# Patient Record
Sex: FEMALE | Race: WHITE | NOT HISPANIC OR LATINO | Employment: OTHER | URBAN - METROPOLITAN AREA
[De-identification: names, ages, dates, MRNs, and addresses within clinical notes are randomized per-mention and may not be internally consistent; named-entity substitution may affect disease eponyms.]

---

## 2017-05-30 ENCOUNTER — APPOINTMENT (OUTPATIENT)
Dept: GENERAL RADIOLOGY | Facility: CLINIC | Age: 82
DRG: 190 | End: 2017-05-30
Attending: EMERGENCY MEDICINE
Payer: MEDICARE

## 2017-05-30 ENCOUNTER — HOSPITAL ENCOUNTER (INPATIENT)
Facility: CLINIC | Age: 82
LOS: 7 days | Discharge: HOME HEALTH | DRG: 190 | End: 2017-06-06
Attending: EMERGENCY MEDICINE | Admitting: INTERNAL MEDICINE
Payer: MEDICARE

## 2017-05-30 DIAGNOSIS — F02.80 ALZHEIMER'S DEMENTIA WITHOUT BEHAVIORAL DISTURBANCE, UNSPECIFIED TIMING OF DEMENTIA ONSET: ICD-10-CM

## 2017-05-30 DIAGNOSIS — G30.9 ALZHEIMER'S DEMENTIA WITHOUT BEHAVIORAL DISTURBANCE, UNSPECIFIED TIMING OF DEMENTIA ONSET: ICD-10-CM

## 2017-05-30 DIAGNOSIS — J44.1 COPD EXACERBATION (H): Primary | ICD-10-CM

## 2017-05-30 DIAGNOSIS — F17.200 TOBACCO DEPENDENCE SYNDROME: ICD-10-CM

## 2017-05-30 DIAGNOSIS — N28.9 RENAL INSUFFICIENCY: ICD-10-CM

## 2017-05-30 DIAGNOSIS — J18.9 PNEUMONIA OF LEFT LOWER LOBE DUE TO INFECTIOUS ORGANISM: ICD-10-CM

## 2017-05-30 DIAGNOSIS — J45.901 REACTIVE AIRWAY DISEASE, UNSPECIFIED ASTHMA SEVERITY, WITH ACUTE EXACERBATION: ICD-10-CM

## 2017-05-30 DIAGNOSIS — R53.81 PHYSICAL DECONDITIONING: ICD-10-CM

## 2017-05-30 LAB
ANION GAP SERPL CALCULATED.3IONS-SCNC: 10 MMOL/L (ref 3–14)
BASE DEFICIT BLDV-SCNC: 1.1 MMOL/L
BASOPHILS # BLD AUTO: 0.1 10E9/L (ref 0–0.2)
BASOPHILS NFR BLD AUTO: 1 %
BUN SERPL-MCNC: 49 MG/DL (ref 7–30)
CALCIUM SERPL-MCNC: 9 MG/DL (ref 8.5–10.1)
CHLORIDE SERPL-SCNC: 95 MMOL/L (ref 94–109)
CO2 SERPL-SCNC: 26 MMOL/L (ref 20–32)
CREAT SERPL-MCNC: 1.53 MG/DL (ref 0.52–1.04)
DIFFERENTIAL METHOD BLD: ABNORMAL
EOSINOPHIL # BLD AUTO: 0 10E9/L (ref 0–0.7)
EOSINOPHIL NFR BLD AUTO: 0 %
ERYTHROCYTE [DISTWIDTH] IN BLOOD BY AUTOMATED COUNT: 12.8 % (ref 10–15)
GFR SERPL CREATININE-BSD FRML MDRD: 32 ML/MIN/1.7M2
GLUCOSE SERPL-MCNC: 115 MG/DL (ref 70–99)
HCO3 BLDV-SCNC: 25 MMOL/L (ref 21–28)
HCT VFR BLD AUTO: 37.3 % (ref 35–47)
HGB BLD-MCNC: 12.3 G/DL (ref 11.7–15.7)
LYMPHOCYTES # BLD AUTO: 0.4 10E9/L (ref 0.8–5.3)
LYMPHOCYTES NFR BLD AUTO: 4 %
MCH RBC QN AUTO: 29.7 PG (ref 26.5–33)
MCHC RBC AUTO-ENTMCNC: 33 G/DL (ref 31.5–36.5)
MCV RBC AUTO: 90 FL (ref 78–100)
MONOCYTES # BLD AUTO: 1.1 10E9/L (ref 0–1.3)
MONOCYTES NFR BLD AUTO: 11 %
NEUTROPHILS # BLD AUTO: 8.7 10E9/L (ref 1.6–8.3)
NEUTROPHILS NFR BLD AUTO: 84 %
O2/TOTAL GAS SETTING VFR VENT: 3 %
PCO2 BLDV: 45 MM HG (ref 40–50)
PH BLDV: 7.35 PH (ref 7.32–7.43)
PLATELET # BLD AUTO: 285 10E9/L (ref 150–450)
PLATELET # BLD EST: NORMAL 10*3/UL
PO2 BLDV: 39 MM HG (ref 25–47)
POTASSIUM SERPL-SCNC: 3.6 MMOL/L (ref 3.4–5.3)
RBC # BLD AUTO: 4.14 10E12/L (ref 3.8–5.2)
RBC MORPH BLD: ABNORMAL
SODIUM SERPL-SCNC: 131 MMOL/L (ref 133–144)
TOXIC GRANULES BLD QL SMEAR: PRESENT
WBC # BLD AUTO: 10.4 10E9/L (ref 4–11)

## 2017-05-30 PROCEDURE — 25000128 H RX IP 250 OP 636: Performed by: EMERGENCY MEDICINE

## 2017-05-30 PROCEDURE — 93005 ELECTROCARDIOGRAM TRACING: CPT

## 2017-05-30 PROCEDURE — 80048 BASIC METABOLIC PNL TOTAL CA: CPT | Performed by: EMERGENCY MEDICINE

## 2017-05-30 PROCEDURE — 25000125 ZZHC RX 250: Performed by: EMERGENCY MEDICINE

## 2017-05-30 PROCEDURE — 25000132 ZZH RX MED GY IP 250 OP 250 PS 637: Mod: GY | Performed by: PHYSICIAN ASSISTANT

## 2017-05-30 PROCEDURE — 96365 THER/PROPH/DIAG IV INF INIT: CPT

## 2017-05-30 PROCEDURE — 71020 XR CHEST 2 VW: CPT

## 2017-05-30 PROCEDURE — 85025 COMPLETE CBC W/AUTO DIFF WBC: CPT | Performed by: EMERGENCY MEDICINE

## 2017-05-30 PROCEDURE — 94640 AIRWAY INHALATION TREATMENT: CPT

## 2017-05-30 PROCEDURE — A9270 NON-COVERED ITEM OR SERVICE: HCPCS | Mod: GY | Performed by: PHYSICIAN ASSISTANT

## 2017-05-30 PROCEDURE — 12000000 ZZH R&B MED SURG/OB

## 2017-05-30 PROCEDURE — 82803 BLOOD GASES ANY COMBINATION: CPT | Performed by: EMERGENCY MEDICINE

## 2017-05-30 PROCEDURE — 96375 TX/PRO/DX INJ NEW DRUG ADDON: CPT

## 2017-05-30 PROCEDURE — 96366 THER/PROPH/DIAG IV INF ADDON: CPT

## 2017-05-30 PROCEDURE — 99223 1ST HOSP IP/OBS HIGH 75: CPT | Mod: AI | Performed by: PHYSICIAN ASSISTANT

## 2017-05-30 PROCEDURE — 25000128 H RX IP 250 OP 636

## 2017-05-30 PROCEDURE — 25000128 H RX IP 250 OP 636: Performed by: PHYSICIAN ASSISTANT

## 2017-05-30 PROCEDURE — 99285 EMERGENCY DEPT VISIT HI MDM: CPT | Mod: 25

## 2017-05-30 RX ORDER — NALOXONE HYDROCHLORIDE 0.4 MG/ML
.1-.4 INJECTION, SOLUTION INTRAMUSCULAR; INTRAVENOUS; SUBCUTANEOUS
Status: DISCONTINUED | OUTPATIENT
Start: 2017-05-30 | End: 2017-06-06 | Stop reason: HOSPADM

## 2017-05-30 RX ORDER — HYDROCHLOROTHIAZIDE 12.5 MG/1
12.5 CAPSULE ORAL DAILY
COMMUNITY
End: 2023-01-01

## 2017-05-30 RX ORDER — AMOXICILLIN 250 MG
1-2 CAPSULE ORAL 2 TIMES DAILY PRN
Status: DISCONTINUED | OUTPATIENT
Start: 2017-05-30 | End: 2017-06-06 | Stop reason: HOSPADM

## 2017-05-30 RX ORDER — METHYLPREDNISOLONE SODIUM SUCCINATE 125 MG/2ML
INJECTION, POWDER, LYOPHILIZED, FOR SOLUTION INTRAMUSCULAR; INTRAVENOUS
Status: COMPLETED
Start: 2017-05-30 | End: 2017-05-30

## 2017-05-30 RX ORDER — LEVOTHYROXINE SODIUM 50 UG/1
50 TABLET ORAL
Status: DISCONTINUED | OUTPATIENT
Start: 2017-05-31 | End: 2017-06-06 | Stop reason: HOSPADM

## 2017-05-30 RX ORDER — DONEPEZIL HYDROCHLORIDE 5 MG/1
5 TABLET, FILM COATED ORAL EVERY EVENING
Status: DISCONTINUED | OUTPATIENT
Start: 2017-05-30 | End: 2017-06-06 | Stop reason: HOSPADM

## 2017-05-30 RX ORDER — SIMVASTATIN 40 MG
40 TABLET ORAL EVERY EVENING
COMMUNITY
End: 2023-01-01

## 2017-05-30 RX ORDER — LIDOCAINE 40 MG/G
CREAM TOPICAL
Status: DISCONTINUED | OUTPATIENT
Start: 2017-05-30 | End: 2017-06-06 | Stop reason: HOSPADM

## 2017-05-30 RX ORDER — DONEPEZIL HYDROCHLORIDE 5 MG/1
5 TABLET, FILM COATED ORAL EVERY EVENING
COMMUNITY
End: 2023-01-01

## 2017-05-30 RX ORDER — CEFTRIAXONE 2 G/1
2 INJECTION, POWDER, FOR SOLUTION INTRAMUSCULAR; INTRAVENOUS ONCE
Status: COMPLETED | OUTPATIENT
Start: 2017-05-30 | End: 2017-05-30

## 2017-05-30 RX ORDER — HYDROCHLOROTHIAZIDE 12.5 MG/1
12.5 CAPSULE ORAL DAILY
Status: DISCONTINUED | OUTPATIENT
Start: 2017-05-31 | End: 2017-06-06 | Stop reason: HOSPADM

## 2017-05-30 RX ORDER — IPRATROPIUM BROMIDE AND ALBUTEROL SULFATE 2.5; .5 MG/3ML; MG/3ML
3 SOLUTION RESPIRATORY (INHALATION)
Status: DISCONTINUED | OUTPATIENT
Start: 2017-05-30 | End: 2017-05-30

## 2017-05-30 RX ORDER — IPRATROPIUM BROMIDE AND ALBUTEROL SULFATE 2.5; .5 MG/3ML; MG/3ML
3 SOLUTION RESPIRATORY (INHALATION)
Status: DISCONTINUED | OUTPATIENT
Start: 2017-05-30 | End: 2017-05-31

## 2017-05-30 RX ORDER — ASPIRIN 325 MG
325 TABLET ORAL DAILY
COMMUNITY
End: 2023-01-01

## 2017-05-30 RX ORDER — ONDANSETRON 4 MG/1
4 TABLET, ORALLY DISINTEGRATING ORAL EVERY 6 HOURS PRN
Status: DISCONTINUED | OUTPATIENT
Start: 2017-05-30 | End: 2017-06-06 | Stop reason: HOSPADM

## 2017-05-30 RX ORDER — QUINAPRIL 10 MG/1
20 TABLET ORAL DAILY
Status: DISCONTINUED | OUTPATIENT
Start: 2017-05-31 | End: 2017-06-06 | Stop reason: HOSPADM

## 2017-05-30 RX ORDER — ONDANSETRON 2 MG/ML
4 INJECTION INTRAMUSCULAR; INTRAVENOUS EVERY 6 HOURS PRN
Status: DISCONTINUED | OUTPATIENT
Start: 2017-05-30 | End: 2017-06-06 | Stop reason: HOSPADM

## 2017-05-30 RX ORDER — ASPIRIN 325 MG
325 TABLET ORAL DAILY
Status: DISCONTINUED | OUTPATIENT
Start: 2017-05-31 | End: 2017-06-06 | Stop reason: HOSPADM

## 2017-05-30 RX ORDER — CEFTRIAXONE 2 G/1
2 INJECTION, POWDER, FOR SOLUTION INTRAMUSCULAR; INTRAVENOUS EVERY 24 HOURS
Status: DISCONTINUED | OUTPATIENT
Start: 2017-05-31 | End: 2017-06-06 | Stop reason: HOSPADM

## 2017-05-30 RX ORDER — PREDNISONE 20 MG/1
40 TABLET ORAL DAILY
Status: DISCONTINUED | OUTPATIENT
Start: 2017-05-31 | End: 2017-06-06 | Stop reason: HOSPADM

## 2017-05-30 RX ORDER — POLYETHYLENE GLYCOL 3350 17 G/17G
17 POWDER, FOR SOLUTION ORAL DAILY PRN
Status: DISCONTINUED | OUTPATIENT
Start: 2017-05-30 | End: 2017-06-06 | Stop reason: HOSPADM

## 2017-05-30 RX ORDER — QUINAPRIL 40 MG/1
20 TABLET ORAL DAILY
COMMUNITY
End: 2023-01-01

## 2017-05-30 RX ORDER — SODIUM CHLORIDE 9 MG/ML
INJECTION, SOLUTION INTRAVENOUS CONTINUOUS
Status: ACTIVE | OUTPATIENT
Start: 2017-05-30 | End: 2017-05-31

## 2017-05-30 RX ORDER — ALBUTEROL SULFATE 0.83 MG/ML
3 SOLUTION RESPIRATORY (INHALATION)
Status: DISCONTINUED | OUTPATIENT
Start: 2017-05-30 | End: 2017-06-06 | Stop reason: HOSPADM

## 2017-05-30 RX ORDER — ACETAMINOPHEN 325 MG/1
650 TABLET ORAL EVERY 4 HOURS PRN
Status: DISCONTINUED | OUTPATIENT
Start: 2017-05-30 | End: 2017-06-06 | Stop reason: HOSPADM

## 2017-05-30 RX ORDER — AZITHROMYCIN 250 MG/1
250 TABLET, FILM COATED ORAL EVERY 24 HOURS
Status: COMPLETED | OUTPATIENT
Start: 2017-05-31 | End: 2017-06-03

## 2017-05-30 RX ORDER — METHYLPREDNISOLONE SODIUM SUCCINATE 125 MG/2ML
125 INJECTION, POWDER, LYOPHILIZED, FOR SOLUTION INTRAMUSCULAR; INTRAVENOUS ONCE
Status: COMPLETED | OUTPATIENT
Start: 2017-05-30 | End: 2017-05-30

## 2017-05-30 RX ORDER — SIMVASTATIN 40 MG
40 TABLET ORAL EVERY EVENING
Status: DISCONTINUED | OUTPATIENT
Start: 2017-05-30 | End: 2017-06-06 | Stop reason: HOSPADM

## 2017-05-30 RX ORDER — NICOTINE 21 MG/24HR
1 PATCH, TRANSDERMAL 24 HOURS TRANSDERMAL DAILY PRN
Status: DISCONTINUED | OUTPATIENT
Start: 2017-05-30 | End: 2017-06-06 | Stop reason: HOSPADM

## 2017-05-30 RX ORDER — CALCIUM CARBONATE 500 MG/1
1 TABLET, CHEWABLE ORAL DAILY
COMMUNITY
End: 2023-01-01

## 2017-05-30 RX ADMIN — METHYLPREDNISOLONE SODIUM SUCCINATE 125 MG: 125 INJECTION INTRAMUSCULAR; INTRAVENOUS at 21:02

## 2017-05-30 RX ADMIN — AZITHROMYCIN MONOHYDRATE 500 MG: 500 INJECTION, POWDER, LYOPHILIZED, FOR SOLUTION INTRAVENOUS at 22:07

## 2017-05-30 RX ADMIN — CEFTRIAXONE 2 G: 2 INJECTION, POWDER, FOR SOLUTION INTRAMUSCULAR; INTRAVENOUS at 21:25

## 2017-05-30 RX ADMIN — SODIUM CHLORIDE: 9 INJECTION, SOLUTION INTRAVENOUS at 23:21

## 2017-05-30 RX ADMIN — DONEPEZIL HYDROCHLORIDE 5 MG: 5 TABLET, FILM COATED ORAL at 23:53

## 2017-05-30 RX ADMIN — METHYLPREDNISOLONE SODIUM SUCCINATE 125 MG: 125 INJECTION, POWDER, FOR SOLUTION INTRAMUSCULAR; INTRAVENOUS at 21:02

## 2017-05-30 RX ADMIN — IPRATROPIUM BROMIDE AND ALBUTEROL SULFATE 3 ML: .5; 3 SOLUTION RESPIRATORY (INHALATION) at 21:03

## 2017-05-30 RX ADMIN — SIMVASTATIN 40 MG: 40 TABLET, FILM COATED ORAL at 23:53

## 2017-05-30 ASSESSMENT — ENCOUNTER SYMPTOMS
ABDOMINAL PAIN: 0
SHORTNESS OF BREATH: 1
COUGH: 1
WHEEZING: 1
FEVER: 0
CHILLS: 0

## 2017-05-30 NOTE — LETTER
Transition Communication Hand-off for Care Transitions to Next Level of Care Provider    Name: Mel Lazaro  MRN #: [id number]  Primary Care Provider: [facility] Saeed [facility]  Primary Care MD Name: Margarita  Primary Clinic: [address] [place] [address]  Primary Care Clinic Name: [facility]  Reason for Hospitalization:  Renal insufficiency [N28.9]  Reactive airway disease, unspecified asthma severity, with acute exacerbation [J45.901]  Pneumonia of left lower lobe due to infectious organism [J18.9]  Admit Date/Time: 5/30/2017  7:13 PM  Discharge Date: 6/6/17  Payor Source: Payor: MEDICARE / Plan: MEDICARE / Product Type: Medicare /     Readmission Assessment Measure (TRAVIS) Risk Score/category: AVERAGE    Plan of Care Goals/Milestone Events:   Patient Concerns: New home 02.  Smoker-given smoking cessation            Reason for Communication Hand-off Referral: Admission diagnoses: PN  Admission diagnoses: COPD  Fragility  Difficulty understanding plan of care  Other rec 24/7 supervisions which dtgs can provide; also smoker with new home 02    Discharge Plan:  Discharge Plan:       Most Recent Value    Concerns To Be Addressed cognitive/perceptual concerns           Concern for non-adherence with plan of care:   YES  Discharge Needs Assessment:  Needs       Most Recent Value    Anticipated Changes Related to Illness inability to care for self [pt declined TCU]    Equipment Currently Used at Home none    Transportation Available car    # of Referrals Placed by [facility], Scheduled Follow-up appointments          Already enrolled in Tele-monitoring program and name of program:  NA  Follow-up specialty is recommended: No    Follow-up plan:  No future appointments.    Any outstanding tests or procedures:    Procedures     Future Labs/Procedures    Oxygen Adult     Comments:    New Home Oxygen Order 2 liter(s) by nasal cannula continuously with use of portable tank. Expected treatment length is 3  months.. Test on conserving device as applicable.    Patients who qualify for home O2 coverage under the CMS guidelines require ABG tests or O2 sat readings obtained closest to, but no earlier than 2 days prior to the discharge, as evidence of the need for home oxygen therapy. Testing must be performed while patient is in the chronic stable state. See notes for O2 sats.    I certify that this patient, Mel Lazaro has been under my care and that I, or a nurse practitioner or physician's assistant working with me, had a face-to-face encounter that meets the face-to-face encounter requirements with this patient on 6/6/2017. The patient, Mel Lazaro was evaluated or treated in whole, or in part, for the following medical condition, which necessitates the use of the ordered oxygen. Treatment Diagnosis: COPD    Attending Provider: Gavin Dugan  Physician signature: See electronic signature associated with these discharge orders  Date of Order: June 6, 2017          Referrals     Future Labs/Procedures    Home care nursing referral     Comments:    RN skilled nursing visit. RN to assess vital signs and weight, respiratory and cardiac status, hydration, nutrition and bowel status and home safety.  RN to teach medication management and home oxygen.    Your provider has ordered home care nursing services. If you have not been contacted within 2 days of your discharge please call the inpatient department phone number at [phone] .    Home Care OT Referral for Hospital Discharge     Comments:    OT to eval and treat    Your provider has ordered home care - occupational therapy. If you have not been contacted within 2 days of your discharge please call the department phone number listed on the top of this document.    Home Care PT Referral for Hospital Discharge     Comments:    PT to eval and treat    Your provider has ordered home care - physical therapy. If you have not been contacted within 2 days of your  discharge please call the department phone number listed on the top of this document.          Reason for your hospital stay       You were hospitalized for pneumonia and you likely have COPD from smoking.  You have finished a course of antibiotics.  You will complete a steroid taper on discharge.  Its recommended you have ongoing 24/7 supervision and home services as you have declined rehab placement.                   Key Recommendations:  PNA diagnosis.  New COPD? Smoker- 1 PPD; [place] 02 through [facility]. Memory issues-OT rec 24/7 supervision, which daughters said they can provide.  [facility] RN/PT/OT with cognitive eval.  Before hospitalization she still drove a car.  New primary MD with Lokesh since Dr Avila left-she had him for 25 years.  F/u appt scheduled with Dr Hamilton to establish care and manage home care orders.  Dtg Tanya sounded overwhelmed on day of dc.  Home care can provide SW consult if needed.      Yu Lilly [phone]    AVS/Discharge Summary is the source of truth; this is a helpful guide for improved communication of patient story

## 2017-05-30 NOTE — IP AVS SNAPSHOT
[place] [address] [facility]    [address]    [facility] [address]    Phone:  [phone]    Fax:  [phone]                                       After Visit Summary   5/30/2017    Mel Lazaro    MRN: [id number]           After Visit Summary Signature Page     I have received my discharge instructions, and my questions have been answered. I have discussed any challenges I see with this plan with the nurse or doctor.    ..........................................................................................................................................  Patient/Patient Representative Signature      ..........................................................................................................................................  Patient Representative Print Name and Relationship to Patient    ..................................................               ................................................  Date                                            Time    ..........................................................................................................................................  Reviewed by Signature/Title    ...................................................              ..............................................  Date                                                            Time

## 2017-05-30 NOTE — IP AVS SNAPSHOT
MRN:[id number]                      After Visit Summary   5/30/2017    Mel Lazaro    MRN: [id number]           Thank you!     Thank you for choosing [facility] for your care. Our goal is always to provide you with excellent care. Hearing back from our patients is one way we can continue to improve our services. Please take a few minutes to complete the written survey that you may receive in the mail after you visit. If you would like to speak to someone directly about your visit please contact Patient Relations at [phone]. Thank you!          Patient Information     Date Of Birth          11/3/1929        Designated Caregiver       Most Recent Value    Caregiver    Will someone help with your care after discharge? no      About your hospital stay     You were admitted on:  May 30, 2017 You last received care in the:  [place] [address] [facility]    You were discharged on:  June 6, 2017        Reason for your hospital stay       You were hospitalized for pneumonia and you likely have COPD from smoking.  You have finished a course of antibiotics.  You will complete a steroid taper on discharge.  Its recommended you have ongoing 24/7 supervision and home services as you have declined rehab placement.                  Who to Call     For medical emergencies, please call 911.  For non-urgent questions about your medical care, please call your primary care provider or clinic, [phone]          Attending Provider     Provider Specialty    Alexis Butler MD Emergency Medicine    Em Gutierrez MD Internal Medicine       Primary Care Provider Office Phone # Fax #    [facility] [phone]      After Care Instructions     Activity       Your activity upon discharge: activity as tolerated            Diet       Follow this diet upon discharge: Orders Placed This Encounter      Room Service      Snacks/Supplements Adult: Ensure Plus (Adult);  Between Meals      Combination Diet Regular Diet Adult            Oxygen Adult       [place] Oxygen Order 2 liter(s) by nasal cannula continuously with use of portable tank. Expected treatment length is 3 months.. Test on conserving device as applicable.    Patients who qualify for home O2 coverage under the CMS guidelines require ABG tests or O2 sat readings obtained closest to, but no earlier than 2 days prior to the discharge, as evidence of the need for home oxygen therapy. Testing must be performed while patient is in the chronic stable state. See notes for O2 sats.    I certify that this patient, Mel Lazaro has been under my care and that I, or a nurse practitioner or physician's assistant working with me, had a face-to-face encounter that meets the face-to-face encounter requirements with this patient on 6/6/2017. The patient, Mel Lazaro was evaluated or treated in whole, or in part, for the following medical condition, which necessitates the use of the ordered oxygen. Treatment Diagnosis: COPD    Attending Provider: Gavin Dugan  Physician signature: See electronic signature associated with these discharge orders  Date of Order: June 6, 2017                  Follow-up Appointments     Follow-up and recommended labs and tests        Follow up with primary care provider, [facility], within 7 days for hospital follow- up.  No follow up labs or test are needed.                  Additional Services     Home Care OT Referral for Hospital Discharge       OT to eval and treat    Your provider has ordered home care - occupational therapy. If you have not been contacted within 2 days of your discharge please call the department phone number listed on the top of this document.            Home Care PT Referral for Hospital Discharge       PT to ochoaal and treat    Your provider has ordered home care - physical therapy. If you have not been contacted within 2 days of your discharge please call  "the department phone number listed on the top of this document.            Home care nursing referral       RN skilled nursing visit. RN to assess vital signs and weight, respiratory and cardiac status, hydration, nutrition and bowel status and home safety.  RN to teach medication management and home oxygen.    Your provider has ordered home care nursing services. If you have not been contacted within 2 days of your discharge please call the inpatient department phone number at [phone] .                  Further instructions from your care team       [facility] RN/[phone]    [facility]  Dr Hamilton June 12 at 11:00 am    Pending Results     No orders found from 5/28/2017 to 5/31/2017.            Statement of Approval     Ordered          06/06/17 9765  I have reviewed and agree with all the recommendations and orders detailed in this document.  EFFECTIVE NOW     Approved and electronically signed by:  Gavin Dugan MD             Admission Information     Date & Time Provider Department Dept. Phone    5/30/2017 Em Gutierrez MD [place] [address] [facility] [phone]      Your Vitals Were     Blood Pressure Pulse Temperature Respirations Height Weight    134/56 (BP Location: Left arm) 70 97.1  F (36.2  C) (Oral) 20 1.651 m (5' 5\") 58.4 kg (128 lb 12.8 oz)    Pulse Oximetry BMI (Body Mass Index)                93% 21.43 kg/m2          [employer] Information     [employer] lets you send messages to your doctor, view your test results, renew your prescriptions, schedule appointments and more. To sign up, go to [email or web address] . Click on \"Log in\" on the left side of the screen, which will take you to the Welcome page. Then click on \"Sign up Now\" on the right side of the page.     You will be asked to enter the access code listed below, as well as some personal information. Please follow the directions to create your username and password.     Your access code " is: [email or web address]  Expires: 2017 12:58 PM     Your access code will  in 90 days. If you need help or a new code, please call your [place] clinic or [phone].        Care EveryWhere ID     This is your Care EveryWhere ID. This could be used by other organizations to access your [place] medical records  [id number]           Review of your medicines      START taking        Dose / Directions    nicotine 21 MG/24HR 24 hr patch   Commonly known as:  NICODERM CQ   Used for:  Tobacco dependence syndrome        Dose:  1 patch   Place 1 patch onto the skin daily as needed for smoking cessation   Quantity:  30 patch   Refills:  0       order for DME   Used for:  Pneumonia of left lower lobe due to infectious organism        Equipment being ordered: Walker Wheels () and Walker () Treatment Diagnosis: gait impairment   Quantity:  1 each   Refills:  0       predniSONE 10 MG tablet   Commonly known as:  DELTASONE   Used for:  COPD exacerbation (H)        Take 30mg for 3 days, 20mg for 3 days, 10mg for 3 days   Quantity:  18 tablet   Refills:  0         CONTINUE these medicines which have NOT CHANGED        Dose / Directions    ARICEPT 5 MG tablet   Generic drug:  donepezil        Dose:  5 mg   Take 5 mg by mouth every evening   Refills:  0       aspirin 325 MG tablet        Dose:  325 mg   Take 325 mg by mouth daily   Refills:  0       calcium carbonate 500 MG chewable tablet   Commonly known as:  TUMS        Dose:  1 chew tab   Take 1 chew tab by mouth daily   Refills:  0       hydrochlorothiazide 12.5 MG capsule   Commonly known as:  MICROZIDE        Dose:  12.5 mg   Take 12.5 mg by mouth daily   Refills:  0       LEVOTHYROXINE SODIUM PO        Dose:  50 mcg   Take 50 mcg by mouth daily   Refills:  0       quinapril 40 MG Tab   Commonly known as:  ACCUPRIL        Dose:  20 mg   Take 20 mg by mouth daily   Refills:  0       sertraline 50 MG tablet   Commonly known as:  ZOLOFT        Dose:  75 mg   Take  75 mg by mouth daily . 1.5 tabs daily   Refills:  0       simvastatin 40 MG tablet   Commonly known as:  ZOCOR        Dose:  40 mg   Take 40 mg by mouth every evening   Refills:  0            Where to get your medicines      These medications were sent to [place], [place] - [address] [facility]  [address] [facility] [address]     Phone:  [phone]     nicotine 21 MG/24HR 24 hr patch    predniSONE 10 MG tablet         Some of these will need a paper prescription and others can be bought over the counter. Ask your nurse if you have questions.     Bring a paper prescription for each of these medications     order for DME                Protect others around you: Learn how to safely use, store and throw away your medicines at www.disposemymeds.org.             Medication List: This is a list of all your medications and when to take them. Check marks below indicate your daily home schedule. Keep this list as a reference.      Medications           Morning Afternoon Evening Bedtime As Needed    ARICEPT 5 MG tablet   Take 5 mg by mouth every evening   Last time this was given:  5 mg on 6/5/2017  8:10 PM   Generic drug:  donepezil                                aspirin 325 MG tablet   Take 325 mg by mouth daily   Last time this was given:  325 mg on 6/6/2017  9:09 AM                                calcium carbonate 500 MG chewable tablet   Commonly known as:  TUMS   Take 1 chew tab by mouth daily                                hydrochlorothiazide 12.5 MG capsule   Commonly known as:  MICROZIDE   Take 12.5 mg by mouth daily   Last time this was given:  12.5 mg on 6/6/2017  9:09 AM                                LEVOTHYROXINE SODIUM PO   Take 50 mcg by mouth daily   Last time this was given:  50 mcg on 6/6/2017  5:36 AM                                nicotine 21 MG/24HR 24 hr patch   Commonly known as:  NICODERM CQ   Place 1 patch onto the skin daily as needed for smoking  cessation                                order for DME   Equipment being ordered: Walker Wheels () and Walker () Treatment Diagnosis: gait impairment                                predniSONE 10 MG tablet   Commonly known as:  DELTASONE   Take 30mg for 3 days, 20mg for 3 days, 10mg for 3 days   Last time this was given:  40 mg on 6/6/2017  9:09 AM                                quinapril 40 MG Tab   Commonly known as:  ACCUPRIL   Take 20 mg by mouth daily   Last time this was given:  20 mg on 6/6/2017  9:09 AM                                sertraline 50 MG tablet   Commonly known as:  ZOLOFT   Take 75 mg by mouth daily . 1.5 tabs daily   Last time this was given:  75 mg on 6/6/2017  9:09 AM                                simvastatin 40 MG tablet   Commonly known as:  ZOCOR   Take 40 mg by mouth every evening   Last time this was given:  40 mg on 6/5/2017  8:10 PM

## 2017-05-31 LAB
ANION GAP SERPL CALCULATED.3IONS-SCNC: 9 MMOL/L (ref 3–14)
BASOPHILS # BLD AUTO: 0 10E9/L (ref 0–0.2)
BASOPHILS NFR BLD AUTO: 0 %
BUN SERPL-MCNC: 45 MG/DL (ref 7–30)
CALCIUM SERPL-MCNC: 8.2 MG/DL (ref 8.5–10.1)
CHLORIDE SERPL-SCNC: 100 MMOL/L (ref 94–109)
CO2 SERPL-SCNC: 24 MMOL/L (ref 20–32)
CREAT SERPL-MCNC: 1.29 MG/DL (ref 0.52–1.04)
CRP SERPL-MCNC: 245 MG/L (ref 0–8)
DIFFERENTIAL METHOD BLD: ABNORMAL
EOSINOPHIL # BLD AUTO: 0.1 10E9/L (ref 0–0.7)
EOSINOPHIL NFR BLD AUTO: 1 %
ERYTHROCYTE [DISTWIDTH] IN BLOOD BY AUTOMATED COUNT: 13 % (ref 10–15)
ERYTHROCYTE [SEDIMENTATION RATE] IN BLOOD BY WESTERGREN METHOD: 76 MM/H (ref 0–30)
GFR SERPL CREATININE-BSD FRML MDRD: 39 ML/MIN/1.7M2
GLUCOSE SERPL-MCNC: 134 MG/DL (ref 70–99)
HCT VFR BLD AUTO: 35 % (ref 35–47)
HGB BLD-MCNC: 11.4 G/DL (ref 11.7–15.7)
INTERPRETATION ECG - MUSE: NORMAL
LACTATE BLD-SCNC: 1.1 MMOL/L (ref 0.7–2.1)
LYMPHOCYTES # BLD AUTO: 0.3 10E9/L (ref 0.8–5.3)
LYMPHOCYTES NFR BLD AUTO: 3 %
MCH RBC QN AUTO: 29.8 PG (ref 26.5–33)
MCHC RBC AUTO-ENTMCNC: 32.6 G/DL (ref 31.5–36.5)
MCV RBC AUTO: 92 FL (ref 78–100)
METAMYELOCYTES # BLD: 0.1 10E9/L
METAMYELOCYTES NFR BLD MANUAL: 1 %
MONOCYTES # BLD AUTO: 0.3 10E9/L (ref 0–1.3)
MONOCYTES NFR BLD AUTO: 3 %
MYELOCYTES # BLD: 0.1 10E9/L
MYELOCYTES NFR BLD MANUAL: 1 %
NEUTROPHILS # BLD AUTO: 9.5 10E9/L (ref 1.6–8.3)
NEUTROPHILS NFR BLD AUTO: 91 %
PLATELET # BLD AUTO: 269 10E9/L (ref 150–450)
PLATELET # BLD EST: NORMAL 10*3/UL
POTASSIUM SERPL-SCNC: 3.7 MMOL/L (ref 3.4–5.3)
PROCALCITONIN SERPL-MCNC: 7.24 NG/ML
RBC # BLD AUTO: 3.82 10E12/L (ref 3.8–5.2)
RBC MORPH BLD: ABNORMAL
SODIUM SERPL-SCNC: 133 MMOL/L (ref 133–144)
WBC # BLD AUTO: 10.4 10E9/L (ref 4–11)

## 2017-05-31 PROCEDURE — 25000128 H RX IP 250 OP 636: Performed by: HOSPITALIST

## 2017-05-31 PROCEDURE — 12000000 ZZH R&B MED SURG/OB

## 2017-05-31 PROCEDURE — 36415 COLL VENOUS BLD VENIPUNCTURE: CPT | Performed by: PHYSICIAN ASSISTANT

## 2017-05-31 PROCEDURE — 99233 SBSQ HOSP IP/OBS HIGH 50: CPT | Performed by: HOSPITALIST

## 2017-05-31 PROCEDURE — 40000275 ZZH STATISTIC RCP TIME EA 10 MIN

## 2017-05-31 PROCEDURE — 40000274 ZZH STATISTIC RCP CONSULT EA 30 MIN

## 2017-05-31 PROCEDURE — 25000125 ZZHC RX 250: Performed by: INTERNAL MEDICINE

## 2017-05-31 PROCEDURE — 85652 RBC SED RATE AUTOMATED: CPT | Performed by: HOSPITALIST

## 2017-05-31 PROCEDURE — 36415 COLL VENOUS BLD VENIPUNCTURE: CPT | Performed by: HOSPITALIST

## 2017-05-31 PROCEDURE — 25000125 ZZHC RX 250: Performed by: PHYSICIAN ASSISTANT

## 2017-05-31 PROCEDURE — 94640 AIRWAY INHALATION TREATMENT: CPT

## 2017-05-31 PROCEDURE — 94640 AIRWAY INHALATION TREATMENT: CPT | Mod: 76

## 2017-05-31 PROCEDURE — 25000132 ZZH RX MED GY IP 250 OP 250 PS 637: Mod: GY | Performed by: PHYSICIAN ASSISTANT

## 2017-05-31 PROCEDURE — 83605 ASSAY OF LACTIC ACID: CPT | Performed by: HOSPITALIST

## 2017-05-31 PROCEDURE — 86140 C-REACTIVE PROTEIN: CPT | Performed by: HOSPITALIST

## 2017-05-31 PROCEDURE — 84145 PROCALCITONIN (PCT): CPT | Performed by: HOSPITALIST

## 2017-05-31 PROCEDURE — A9270 NON-COVERED ITEM OR SERVICE: HCPCS | Mod: GY | Performed by: PHYSICIAN ASSISTANT

## 2017-05-31 PROCEDURE — 25000128 H RX IP 250 OP 636: Performed by: PHYSICIAN ASSISTANT

## 2017-05-31 PROCEDURE — 80048 BASIC METABOLIC PNL TOTAL CA: CPT | Performed by: PHYSICIAN ASSISTANT

## 2017-05-31 PROCEDURE — 85025 COMPLETE CBC W/AUTO DIFF WBC: CPT | Performed by: PHYSICIAN ASSISTANT

## 2017-05-31 RX ORDER — SODIUM CHLORIDE 9 MG/ML
INJECTION, SOLUTION INTRAVENOUS CONTINUOUS
Status: ACTIVE | OUTPATIENT
Start: 2017-05-31 | End: 2017-06-01

## 2017-05-31 RX ORDER — IPRATROPIUM BROMIDE AND ALBUTEROL SULFATE 2.5; .5 MG/3ML; MG/3ML
3 SOLUTION RESPIRATORY (INHALATION) 4 TIMES DAILY
Status: DISCONTINUED | OUTPATIENT
Start: 2017-05-31 | End: 2017-06-06 | Stop reason: HOSPADM

## 2017-05-31 RX ADMIN — SERTRALINE HYDROCHLORIDE 75 MG: 25 TABLET ORAL at 08:16

## 2017-05-31 RX ADMIN — LEVOTHYROXINE SODIUM 50 MCG: 50 TABLET ORAL at 05:57

## 2017-05-31 RX ADMIN — GUAIFENESIN 10 ML: 100 SOLUTION ORAL at 05:57

## 2017-05-31 RX ADMIN — PREDNISONE 40 MG: 20 TABLET ORAL at 08:15

## 2017-05-31 RX ADMIN — SIMVASTATIN 40 MG: 40 TABLET, FILM COATED ORAL at 20:28

## 2017-05-31 RX ADMIN — QUINAPRIL HYDROCHLORIDE 20 MG: 10 TABLET, FILM COATED ORAL at 08:16

## 2017-05-31 RX ADMIN — AZITHROMYCIN 250 MG: 250 TABLET, FILM COATED ORAL at 21:49

## 2017-05-31 RX ADMIN — ASPIRIN 325 MG ORAL TABLET 325 MG: 325 PILL ORAL at 08:17

## 2017-05-31 RX ADMIN — CEFTRIAXONE 2 G: 2 INJECTION, POWDER, FOR SOLUTION INTRAMUSCULAR; INTRAVENOUS at 20:27

## 2017-05-31 RX ADMIN — DONEPEZIL HYDROCHLORIDE 5 MG: 5 TABLET, FILM COATED ORAL at 20:28

## 2017-05-31 RX ADMIN — SODIUM CHLORIDE: 9 INJECTION, SOLUTION INTRAVENOUS at 20:21

## 2017-05-31 RX ADMIN — HYDROCHLOROTHIAZIDE 12.5 MG: 12.5 CAPSULE ORAL at 08:17

## 2017-05-31 RX ADMIN — IPRATROPIUM BROMIDE AND ALBUTEROL SULFATE 3 ML: .5; 3 SOLUTION RESPIRATORY (INHALATION) at 19:41

## 2017-05-31 RX ADMIN — IPRATROPIUM BROMIDE AND ALBUTEROL SULFATE 3 ML: .5; 3 SOLUTION RESPIRATORY (INHALATION) at 11:13

## 2017-05-31 NOTE — PROGRESS NOTES
[facility]    Hospitalist Progress Note    Date of Service (when I saw the patient): 05/31/2017  Provider:  Rey Desai MD     Initial presenting complaint/issue to hospital (Diagnosis): shortness of breath and cough   Assessment & Plan   Mel Lazaro is a 87 year old female with a PMH significant for HTN, CKD, HLP, hypothyroidism, mild Alzheimer's disease and current tobacco use, who presents with cough and SOB.     1. Community acquired pneumonia - 3-4 days of increase cough and SOB. No fever, no chest pain just dry cough. Hypoxemic on room air at urgent care, % on 2L O2 here. Received 125 mg IV solu-medrol.  - On Rocephin and Azithromycin.   - Scheduled Duonebs and PO Prednisone, 40 mg daily.   - Wean O2 as able.    2. Current tobacco use, likely underlying undiagnosed COPD - smokes not quite 1 pack per day, no formal dx of COPD but likely has this underlying given hyperinflation on CXR.    3. CKD stage III - Cr 1.29 (1.53) continue IVF until tomorrow. Avoid nephrotoxins  4. HTN -  home meds with parameters.  5. Hypothyroidism - resume home meds  6. HLP - home meds  7. Mild Alzheimer's disease - on aricept. Pt still lives independently. Needs cognitive assessment.   8. Hyponatremia - resolved.         DVT Prophylaxis: Pneumatic Compression Devices  Code Status: Full Code    Disposition: Expected discharge in 2 days once not in need for O2 sup.    Interval History   She feels much better, less dyspnea and no fever. Less cough. Low appetite. Able to use toilet independently.     -Data reviewed today: I reviewed all new labs and imaging results over the last 24 hours. I personally reviewed the EKG tracing showing NSR.    Physical Exam   Temp: 96  F (35.6  C) Temp src: Oral BP: 98/52 Pulse: 92 Heart Rate: 86 Resp: 20 SpO2: 92 % O2 Device: Nasal cannula Oxygen Delivery: 2 LPM  Vitals:    05/30/17 1928 05/31/17 0407   Weight: 60.8 kg (134 lb) 57.8 kg (127 lb 6.4 oz)     Vital Signs with  Ranges  Temp:  [95.9  F (35.5  C)-97.9  F (36.6  C)] 96  F (35.6  C)  Pulse:  [92-95] 92  Heart Rate:  [70-86] 86  Resp:  [18-22] 20  BP: ()/() 98/52  SpO2:  [85 %-97 %] 92 %  I/O last 3 completed shifts:  In: 1291 [P.O.:600; I.V.:691]  Out: -     GEN:  Alert, cooperative, appears comfortable, NAD.  HEENT:  Normocephalic/atraumatic, no scleral icterus, no nasal discharge, mouth moist.  CV:  Regular rate and rhythm, no murmur or JVD.  S1 + S2 noted, no S3 or S4.  LUNGS: Sounds globally diminished to auscultation bilaterally without rales/rhonchi/wheezing/retractions.  Symmetric chest rise on inhalation noted.  ABD:  Active bowel sounds, soft, non-tender/non-distended.  No rebound/guarding/rigidity.  EXT:  No edema or cyanosis.  No joint synovitis noted.  SKIN:  Dry to touch, no exanthems noted in the visualized areas.       Medications        ipratropium - albuterol 0.5 mg/2.5 mg/3 mL  3 mL Nebulization 4x Daily     aspirin  325 mg Oral Daily     donepezil  5 mg Oral QPM     hydrochlorothiazide  12.5 mg Oral Daily     levothyroxine (SYNTHROID/LEVOTHROID) tablet 50 mcg  50 mcg Oral QAM AC     quinapril  20 mg Oral Daily     sertraline  75 mg Oral Daily     simvastatin  40 mg Oral QPM     cefTRIAXone  2 g Intravenous Q24H     azithromycin  250 mg Oral Q24H     sodium chloride (PF)  3 mL Intracatheter Q8H     predniSONE  40 mg Oral Daily     nicotine   Transdermal Q8H     nicotine   Transdermal Daily       Data     Recent Labs  Lab 05/31/17  0628 05/30/17 2026   WBC 10.4 10.4   HGB 11.4* 12.3   MCV 92 90    285    131*   POTASSIUM 3.7 3.6   CHLORIDE 100 95   CO2 24 26   BUN 45* 49*   CR 1.29* 1.53*   ANIONGAP 9 10   JOE 8.2* 9.0   * 115*       Recent Results (from the past 24 hour(s))   XR Chest 2 Views    Narrative    CHEST TWO VIEWS   5/30/2017 8:45 PM    HISTORY: Shortness of breath.    COMPARISON: None.      Impression    IMPRESSION: There is a moderate infiltrate in the anterior  aspect of  the left lung base. The right lung is clear. No other abnormality is  seen.      PEDRO LUIS MATTA MD             Disclaimer: This note consists of symbols derived from keyboarding, dictation and/or voice recognition software. As a result, there may be errors in the script that have gone undetected. Please consider this when interpreting information found in this chart.

## 2017-05-31 NOTE — ED NOTES
"Pt sent from clinic for reported oxygen saturations of 88% on room air.  Pt states she has had a \"productive\" cough starting on Friday.    "

## 2017-05-31 NOTE — ED NOTES
"[facility]  ED Nurse Handoff Report    Mel Lazaro is a 87 year old female   ED Chief complaint: Shortness of Breath  . ED Diagnosis:   Final diagnoses:   Pneumonia of left lower lobe due to infectious organism   Renal insufficiency   Reactive airway disease, unspecified asthma severity, with acute exacerbation     Allergies: No Known Allergies    Code Status: Full Code  Activity level - Baseline/Home:  Independent. Activity Level - Current:   Stand with Assist. Lift room needed: No. Bariatric: No   Needed: No   Isolation: No. Infection: Not Applicable.     Vital Signs:   Vitals:    05/30/17 2015 05/30/17 2030 05/30/17 2100 05/30/17 2115   BP: 144/63 124/84 136/59 136/63   Pulse:       Resp:       Temp:       TempSrc:       SpO2: 97% 95% 97% 97%   Weight:       Height:         Cardiac Rhythm:  ,      Pain level:    Patient confused: No. Patient Falls Risk: Yes.   Elimination Status: Has voided   Patient Report - Initial Complaint: Pt sent from clinic for reported oxygen saturations of 88% on room air.  Pt states she has had a \"productive\" cough starting on Friday.  . Focused Assessment: Respiratory - Respiratory WDL:  WDL except; all Lung Fields: RLL; LLL; RUL; ALFREDO Cough Frequency: frequent Cough Type: fair; productive; congested ALFREDO Breath Sounds: diminished LLL Breath Sounds: diminished RUL Breath Sounds: diminished RLL Breath Sounds: wheezes expiratory   Tests Performed: labs, chest xray. Abnormal Results: see xray results  . Abnormal Result -   Sodium: 131 mmol/L [Ref Range: 133 - 144]  Potassium: 3.6 mmol/L [Ref Range: 3.4 - 5.3]  Chloride: 95 mmol/L [Ref Range: 94 - 109]  Carbon Dioxide: 26 mmol/L [Ref Range: 20 - 32]  Anion Gap: 10 mmol/L [Ref Range: 3 - 14]  Glucose: 115 mg/dL [Ref Range: 70 - 99]  Urea Nitrogen: 49 mg/dL [Ref Range: 7 - 30]  Creatinine: 1.53 mg/dL [Ref Range: 0.52 - 1.04]  GFR Estimate: 32 mL/min/1.7m2 [Ref Range: >60] (Non  GFR Calc)  GFR " Estimate If Black: 39 mL/min/1.7m2 [Ref Range: >60] ( GFR Calc)  Calcium: 9.0 mg/dL [Ref Range: 8.5 - 10.1]  Collected: 5/30/2017 20:26  Last updated: 5/30/2017 21:02    Treatments provided: ABX, nebs, O2 3L  Family Comments: two daughters at bedside  OBS brochure/video discussed/provided to patient:  N/A  ED Medications:   Medications   ipratropium - albuterol 0.5 mg/2.5 mg/3 mL (DUONEB) neb solution 3 mL (3 mLs Nebulization Given 5/30/17 2103)   cefTRIAXone (ROCEPHIN) 2 g vial to attach to  ml bag for ADULTS or NS 50 ml bag for PEDS (2 g Intravenous New Bag 5/30/17 2125)   azithromycin (ZITHROMAX) 500 mg in NaCl 0.9 % 250 mL intermittent infusion (not administered)   methylPREDNISolone sodium succinate (solu-MEDROL) injection 125 mg (125 mg Intravenous Given 5/30/17 2102)     Drips infusing:  Yes     ED Nurse Name/Phone Number: Caridad Mayers,   9:39 PM  RECEIVING UNIT ED HANDOFF REVIEW    Above ED Nurse Handoff Report was reviewed: Yes  Reviewed by: Blair Hernandez on May 30, 2017 at 10:21 PM

## 2017-05-31 NOTE — PHARMACY-ADMISSION MEDICATION HISTORY
Admission medication history interview status for this patient is complete. See [facility] admission navigator for allergy information, prior to admission medications and immunization status.     Medication history interview source(s):Patient and Family  Medication history resources (including written lists, pill bottles, clinic record):med list  Primary pharmacy:[facility]    Changes made to PTA medication list:  Added: all listed meds   Deleted: -  Changed: -    Actions taken by pharmacist (provider contacted, etc):None     Additional medication history information:None    Medication reconciliation/reorder completed by provider prior to medication history? No    For patients on insulin therapy: No   Lantus/levemir/NPH/Mix 70/30 dose:  _____   in AM/PM  or twice daily   Sliding scale Novolog Y/N  If Yes, do you have a baseline novolog pre-meal dose:  ______units with meals   Patients eat three meals a day:   Y/N     Any Barriers to therapy:  cost of medications/comfortable with giving injections (if applicable)/ comfortable and confident with current diabetes regimen       Prior to Admission medications    Medication Sig Last Dose Taking? Auth Provider   hydrochlorothiazide (MICROZIDE) 12.5 MG capsule Take 12.5 mg by mouth daily 5/30/2017 at Unknown time Yes Reported, Patient   LEVOTHYROXINE SODIUM PO Take 50 mcg by mouth daily  5/30/2017 at Unknown time Yes Reported, Patient   donepezil (ARICEPT) 5 MG tablet Take 5 mg by mouth every evening 5/29/2017 at Unknown time Yes Unknown, Entered By History   quinapril (ACCUPRIL) 40 MG Tab Take 20 mg by mouth daily 5/30/2017 at Unknown time Yes Unknown, Entered By History   sertraline (ZOLOFT) 50 MG tablet Take 75 mg by mouth daily . 1.5 tabs daily 5/30/2017 at Unknown time Yes Unknown, Entered By History   simvastatin (ZOCOR) 40 MG tablet Take 40 mg by mouth every evening 5/29/2017 at Unknown time Yes Unknown, Entered By History   aspirin 325 MG tablet Take 325 mg by mouth daily  5/30/2017 at Unknown time Yes Unknown, Entered By History   calcium carbonate (TUMS) 500 MG chewable tablet Take 1 chew tab by mouth daily 5/30/2017 at Unknown time Yes Unknown, Entered By History

## 2017-05-31 NOTE — ED PROVIDER NOTES
History     Chief Complaint:  Shortness of Breath    HPI:  Mel Lazaro is a 87 year old female with a history of anxiety and depression who presents with shortness of breath. The patient's daughters report evidence of shortness of breath, tachypnea, mildly productive cough, and wheezing for the past four days. One of the daughters also notes some confusion as to her current state. The patient herself claims she feels fine other than some mild rhinorrhea. She was evaluated at urgent care where she received a nebulizer treatment and some fluid before being referred to the ED. There, the doctor was concerned for undiagnosed COPD as the patient was hypoxic at 84% on room air. She denies chest pain, fever, chills, abdominal pain, sick contacts, or recent travel.    Allergies:  No known drug allergies      Medications:    Microzide  Levothyroxine     Past Medical History:    Anxiety  Hypertension    Past Surgical History:    History reviewed. No pertinent surgical history.     Family History:    History reviewed. No pertinent family history.      Social History:  Smoking status: Current Everyday Smoker -- 1.0 ppd for 70 years  Alcohol use: No   Marital Status:     Presents with daughters at bedside.     Review of Systems   Constitutional: Negative for chills and fever.   Respiratory: Positive for cough, shortness of breath and wheezing.    Cardiovascular: Negative for chest pain.   Gastrointestinal: Negative for abdominal pain.   All other systems reviewed and are negative.      Physical Exam     Patient Vitals for the past 24 hrs:   BP Temp Temp src Pulse Resp SpO2 Height Weight   05/30/17 2115 136/63 - - - - 97 % - -   05/30/17 2100 136/59 - - - - 97 % - -   05/30/17 2030 124/84 - - - - 95 % - -   05/30/17 2015 144/63 - - - - 97 % - -   05/30/17 2000 149/64 - - - - 94 % - -   05/30/17 1945 134/61 - - - - 95 % - -   05/30/17 1930 140/67 - - - - 95 % - -   05/30/17 1928 (!) 153/116 97.6  F (36.4  C) Oral 94  "20 90 % 1.651 m (5' 5\") 60.8 kg (134 lb)      Physical Exam:  General:                        Well-nourished                        Speaking in full sentences                        Pursed lip breathing pattern  Eyes:                        Conjunctiva without injection or scleral icterus                        PERRL  ENT:                        Moist mucous membranes                        Posterior oropharynx clear without erythema or exudate                        Nares patent                        Pinnae normal  Neck:                        Full ROM                        No stiffness appreciated                        No stridor  Resp:                        Coarse breath sounds bilaterally                        Prolonged expiratory phase                        Accessory muscle use of strap muscles                        Expiratory wheezing bilaterally  CV:                                        Normal rate, regular rhythm                        S1 and S2 present                        No murmur, gallop or rub  GI:                        BS present                        Abdomen soft without distention                        Non-tender to light and deep palpation                        No guarding or rebound tenderness  Skin:                        Warm, dry, well perfused                        No rashes or open wounds on exposed skin  MSK:                        Moves all extremities                        No focal deformities or swelling  Neuro:                        Alert                        Answers questions appropriately                        Moves all extremities equally                        Gait stable  Psych:                        Normal affect, normal mood    Emergency Department Course     ECG (19:27:56):  Rate 97 bpm. VT interval 146. QRS duration 86. QT/QTc 376/477. P-R-T axes 73-72-87. Normal sinus rhythm. Possible left atrial enlargement. Nonspecific ST abnormality. Abnormal ECG. " Interpreted at 1949 by Alexis Butler MD.     Imaging:  Radiographic findings were communicated with the patient and family who voiced understanding of the findings.    X-ray Chest, 2 views:  IMPRESSION: There is a moderate infiltrate in the anterior aspect of  the left lung base. The right lung is clear. No other abnormality is  seen.   Result per radiology.     Laboratory:  Blood Gas: WNL  CBC: WNL (WBC 10.4, HGB 4.14, )    Basic Metabolic Panel: Glucose 115 (H), BUN 49 (H), Creatinine 1.53 (H), GFR Estimate 32 (L), o/w WNL     Interventions:   2102- Solu-medrol 125 mg IV  2103- Duoneb 3 mL Nebulizer    Emergency Department Course:  An ECG was obtained.    Past medical records, nursing notes, and vitals reviewed.  1954: I performed an exam of the patient and obtained history, as documented above.    IV inserted and blood drawn.     The patient was sent for a chest X-Ray while in the emergency department, findings above.      2054: I rechecked the patient. Explained findings to the patient.     The above interventions were administered.    Findings and plan explained to the Patient and daughters who consent to admission.     2139: Discussed the patient with Nikia Alvarado PA-C, who will admit for Dr. Gutierrez patient to a hospital bed for further monitoring, evaluation, and treatment.      Impression & Plan      Medical Decision Making:  Mel Lazaro is a very pleasant 87 year old female with a history of tobacco abuse who presents to the emergency department for evaluation of shortness of breath. Vital signs on presentation are notable for elevated blood pressure and SPO2 of 90% on room air. Both of these improved with supplemental oxygen. History, exam, and ED course are as above. This included imaging and laboratory studies. These are consistent with left lower lobe pneumonia. She does have symptoms of a cough, as well as diminished breath sounds at both lung bases. She does additionally exhibit  evidence of prolonged expiratory phase, pursed-lip breathing, and expiratory wheezing, all suggestive of underlying reactive airway disease, but she carries no forma, diagnosis of this or of COPD. I am most suspicious that these are contributing given her long-standing tobacco abuse history. She was given solu-medrol and nebulizer treatment with some improvement of her symptoms. Laboratory studies reveal a normal WBC of 10.4. Her metabolic function reveals hyponatremia of 131 as well as renal insufficiency with a creatinine of 1.53. The patient has not required respiratory support such as Bi-PAP. She is resting comfortably on supplemental oxygen. She will be started on Azithromycin and Rocephin for community required pneumonia. I do not feel that her current presentation is consistent with ACS, PE, or CHF. The case was discussed with Nikia Alvarado, accepting for Dr. Gutierrez, who will admit the patient for further treatment. All questions were answered prior to admission.     Diagnosis:    ICD-10-CM    1. Pneumonia of left lower lobe due to infectious organism J18.9 CBC with platelets differential     Basic metabolic panel   2. COPD exacerbation (H) J44.1      Disposition:  Admitted to Dr. Gutierrez.     Cathie Ramírez  5/30/2017   [facility] EMERGENCY DEPARTMENT    I, Cathie Ramírez, am serving as a scribe at 7:54 PM on 5/30/2017 to document services personally performed by Alexis Butler MD based on my observations and the provider's statements to me.       Alexis Butler MD  05/30/17 [id number]

## 2017-05-31 NOTE — PLAN OF CARE
Problem: Goal Outcome Summary  Goal: Goal Outcome Summary  Outcome: No Change  Pt very weak and tired up to BR once to void and up in chair but had to stop and rest after just a few steps d/t weakness and Dypsnea, continues on oxygen at 1-2 L and doesn't use oxygen at home per baseline, smoker and not interested in quitting at this time after smoking cessation was discussed also declined the nicotine patch, AOx3 but forgetful needs reorientation, FR/A for safety up with SBA but was independent prior to admission, updated her daughter Carlita on plan of care, continues to have coarse LS and non prod cough still need sputum, taught how to use IS but only able to get to 3-500 and needs reminders use this.

## 2017-05-31 NOTE — H&P
History and Physical     Mel Lazaro MRN# [id number]   YOB: 1929 Age: 87 year old      Date of Admission:  5/30/2017    Primary care provider: [facility]          Assessment and Plan:   Mel Lazaro is a 87 year old female with a PMH significant for HTN, CKD, HLP, hypothyroidism, mild Alzheimer's disease and current tobacco use, who presents with cough and SOB.    1. Community acquired pneumonia - 3-4 days of increase cough and SOB. Pt denies fever, chest pain or significantly productive cough. Hypoxic on room air at urgent care, % on 2L O2 here. Received 125 mg IV solu-medrol, Duoneb and Rocephin and Azithromycin in the ED. Will continue abx, scheduled Duonebs and PO Prednisone, 40 mg daily. Wean O2 as able. Gentle IVFs  2. Current tobacco use, likely underlying undiagnosed COPD - smokes not quite 1 pack per day, no formal dx of COPD but likely has this underlying given hyperinflation on CXR. Management per above, if not improving on PO Prednisone, consider switching back to IV solu-medrol. Pt may benefit getting started on daily inhaler upon discharge to manage likely COPD.  3. CKD - Cr 1.53 today which appears to be her baseline. Avoid nephrotoxins  4. HTN - resume home meds with parameters.  5. Hypothyroidism - resume home meds  6. HLP - resume home meds  7. Mild Alzheimer's disease - on aricept. Pt still lives independently  8. Hyponatremia - mild. Possibly related to mild dehydration or diuretic use, or both. Gentle IVFs overnight, recheck in AM    Prophylaxis - mechanical, encourage ambulation  Code status - Full Code  Dispo - likely require greater than 2 nights for adequate management                 Chief Complaint:   Cough, SBO         History of Present Illness:   Mel Lazaro is a 87 year old female with a PMH significant for HTN, CKD, HLP, hypothyroidism, mild Alzheimer's disease and current tobacco use, who presents with cough and SOB. Pt reports 3-4 days  of increased SOB and cough. Pt down plays SOB but daughter present states that family really noticed increased SOB with ambulating short distances in the house. Pt denies having a significantly productive cough, denies fever, chills, or chest pain. She is a daily smoker of just about 1 pack per day. She denies hx of COPD and does not use any inhalers at home. She further denies abd pain, nausea, vomiting, diarrhea, dysuria or loss of appetite. She lives independently and does quite well on her own at this time. Pt initially presented to Urgent Care for her sx and her O2 sats were noted to be 84% on room air. She received 1 duoneb before coming to the ED.  In the ED, VS are stable here, SpO2 90% on room air, improved to % on 2L O2 via NC. BMP - Na 131, Cr 1.53, BUN 49. VBG normal. CBC unremarkable. EKG SR with nonspecific ST abnormality. CXR shows moderate infiltrate in left lung base. She received 125 mg IV solu-medrol, Duoneb x1, IV Rocephin and IV Azithromycin.    Hx obtained by speaking with ED physician, chart review and pt interview.               Past Medical History:     Past Medical History:   Diagnosis Date     Anxiety      Hypertension                Past Surgical History:   Tonsillectomy            Social History:     Social History     Social History     Marital status:      Spouse name: N/A     Number of children: N/A     Years of education: N/A     Occupational History     Not on file.     Social History Main Topics     Smoking status: Current Every Day Smoker     Packs/day: 1.00     Years: 70.00     Smokeless tobacco: Not on file     Alcohol use Not on file     Drug use: Not on file     Sexual activity: Not on file     Other Topics Concern     Not on file     Social History Narrative     No narrative on file               Family History:   Father - CAD  Mother - CAD  Brother - DM         Allergies:    No Known Allergies            Medications:     Prior to Admission medications   "  Medication Sig Last Dose Taking? Auth Provider   hydrochlorothiazide (MICROZIDE) 12.5 MG capsule Take 12.5 mg by mouth daily 5/30/2017 at Unknown time Yes Reported, Patient   LEVOTHYROXINE SODIUM PO Take 50 mcg by mouth daily  5/30/2017 at Unknown time Yes Reported, Patient   donepezil (ARICEPT) 5 MG tablet Take 5 mg by mouth every evening 5/29/2017 at Unknown time Yes Unknown, Entered By History   quinapril (ACCUPRIL) 40 MG Tab Take 20 mg by mouth daily 5/30/2017 at Unknown time Yes Unknown, Entered By History   sertraline (ZOLOFT) 50 MG tablet Take 75 mg by mouth daily . 1.5 tabs daily 5/30/2017 at Unknown time Yes Unknown, Entered By History   simvastatin (ZOCOR) 40 MG tablet Take 40 mg by mouth every evening 5/29/2017 at Unknown time Yes Unknown, Entered By History   aspirin 325 MG tablet Take 325 mg by mouth daily 5/30/2017 at Unknown time Yes Unknown, Entered By History   calcium carbonate (TUMS) 500 MG chewable tablet Take 1 chew tab by mouth daily 5/30/2017 at Unknown time Yes Unknown, Entered By History              Review of Systems:   A Comprehensive greater than 10 system review of systems was carried out.  Pertinent positives and negatives are noted above.  Otherwise negative for contributory information.     Review Of Systems  Skin: negative  Eyes: negative  Ears/Nose/Throat: negative  Respiratory: No shortness of breath, dyspnea on exertion, cough, or hemoptysis  Cardiovascular: negative  Gastrointestinal: negative  Genitourinary: negative  Musculoskeletal: negative  Neurologic: negative  Psychiatric: negative  Hematologic/Lymphatic/Immunologic: negative  Endocrine: negative             Physical Exam:   Blood pressure 132/63, pulse 94, temperature 97.6  F (36.4  C), temperature source Oral, resp. rate 20, height 1.651 m (5' 5\"), weight 60.8 kg (134 lb), SpO2 96 %.  Exam:  GENERAL:  Comfortable.  PSYCH: pleasant, oriented, No acute distress.  HEENT:  Atraumatic, normocephalic. Normal conjunctiva, " normal hearing, and  lips are dry and chapped  NECK:  Supple, no neck vein distention  HEART:  Normal S1, S2 with no murmur, no pericardial rub, gallops or S3 or S4.  LUNGS:  Rhonchi and expiratory course wheezes heard anteriorly, diminished lung sounds in bilateral bases, slight expiratory wheeze heard posteriorly. Prolonged expiration.  GI:  Soft, normal bowel sounds. Non-tender, non distended.   EXTREMITIES:  No pedal edema, +2 pulses bilateral and equal.  SKIN:  Dry to touch. No rash, wound or ulcerations.  NEUROLOGIC:  CN 2-12 intact, BL 5/5 symmetric upper and lower extremity strength, sensation is intact with no focal deficits.             Data:       Recent Labs  Lab 05/30/17 2026   WBC 10.4   HGB 12.3   HCT 37.3   MCV 90          Recent Labs  Lab 05/30/17 2026   *   POTASSIUM 3.6   CHLORIDE 95   CO2 26   ANIONGAP 10   *   BUN 49*   CR 1.53*   GFRESTIMATED 32*   GFRESTBLACK 39*   JOE 9.0       Recent Results (from the past 48 hour(s))   XR Chest 2 Views    Narrative    CHEST TWO VIEWS   5/30/2017 8:45 PM    HISTORY: Shortness of breath.    COMPARISON: None.      Impression    IMPRESSION: There is a moderate infiltrate in the anterior aspect of  the left lung base. The right lung is clear. No other abnormality is  seen.      MD Nikia HOYT PA-C    This patient was seen and discussed with Dr. Gutierrez who agrees with the current plans as outlined above.

## 2017-05-31 NOTE — CONSULTS
Care Transition Initial Assessment - RN    Reason For Consult: care coordination/care conference, discharge planning   Met with: Patient and talked with Raffymadiha Martínez over the phone that manages her medical issue.    DATA   Active Problems:    Community acquired pneumonia    Pneumonia       Cognitive Status: awake, alert and forgetful.  [facility]  Primary Care MD Name: Margarita  Contact information and PCP information verified: Yes, pt wasn't aware that Dr Avila has left the clinic.  She has seen him for 25 years.  Dtg Tanya is aware and plans on having her establish with [facility].      Lives With: alone  Living Arrangements: house rambler  Quality Of Family Relationships: supportive  Description of Support System: Supportive   Who is your support system?: Children -She has three daughters that help her.  Individually they manage meals, medical and financial split between the three of them.          Insurance concerns: No Insurance issues identified    ASSESSMENT  Patient currently receives the following services:  NONE.  She relies upon her daughters for support.  She still drives to [employer] and the grocery store, which is around a 1 [address] [place] [address]          Identified issues/concerns regarding health management: Dtg Tanya said pt doesn't complain and is very private.  She is very forgetful also.  MD notified of PT/OT request.  Pt lives alone, but daughters check on her frequently.  Pt was given PNA action care plan.  Dtg Tanya also notified of action care plan.  Pt smokes a pack a day.  She said she enjoys smoking and isn't sure if she would like to quit.  Pt declines a need for smoking cessation while in the hospital.  Tanya did discuss her growing concern with her mother living alone recently because when she checked on her she had feces in her bed, on the floor and around the house.  This hasn't happened before.  She is wondering if it's because of her mother being ill from PNA.          PLAN  Patient/family is agreeable to the plan?  Yes: await PT/OT recommendations  Patient anticipates discharging to Home with support.  She may need home 02, but doesn't plan on stopping smoking .        Patient anticipates needs for home equipment: Yes, home 02?     Plan/Disposition: Home   Appointments: She is agreeable to f/u with [facility] within 7 days after discharge.  Handoff will be given to [facility]        Care  (CTS) will continue to follow as needed.    Luisa RAINES CTS [id number]

## 2017-05-31 NOTE — PLAN OF CARE
Problem: COPD, Chronic Bronchitis/Emphysema (Adult)  Goal: Signs and Symptoms of Listed Potential Problems Will be Absent or Manageable (COPD, Chronic Bronchitis/Emphysema)  Signs and symptoms of listed potential problems will be absent or manageable by discharge/transition of care (reference COPD, Chronic Bronchitis/Emphysema (Adult) CPG).  Outcome: Improving  VSS. Pt denies pain. AAO x4 most of the night, woke up this AM scared & confused, provided comfort & re-orientation. Pt reports REED and frequent, non-productive cough, Robitussin x1. On 2 L NC, lung sounds coarse, expiratory wheezing, & diminished bases. Pt up with SBA, reports feeling very weak, bed alarm on, voiding fine.

## 2017-06-01 ENCOUNTER — APPOINTMENT (OUTPATIENT)
Dept: PHYSICAL THERAPY | Facility: CLINIC | Age: 82
DRG: 190 | End: 2017-06-01
Attending: HOSPITALIST
Payer: MEDICARE

## 2017-06-01 ENCOUNTER — APPOINTMENT (OUTPATIENT)
Dept: OCCUPATIONAL THERAPY | Facility: CLINIC | Age: 82
DRG: 190 | End: 2017-06-01
Attending: HOSPITALIST
Payer: MEDICARE

## 2017-06-01 LAB
ANION GAP SERPL CALCULATED.3IONS-SCNC: 8 MMOL/L (ref 3–14)
BASOPHILS # BLD AUTO: 0 10E9/L (ref 0–0.2)
BASOPHILS NFR BLD AUTO: 0 %
BUN SERPL-MCNC: 57 MG/DL (ref 7–30)
CALCIUM SERPL-MCNC: 8.3 MG/DL (ref 8.5–10.1)
CHLORIDE SERPL-SCNC: 102 MMOL/L (ref 94–109)
CO2 SERPL-SCNC: 27 MMOL/L (ref 20–32)
CREAT SERPL-MCNC: 1.4 MG/DL (ref 0.52–1.04)
DIFFERENTIAL METHOD BLD: ABNORMAL
EOSINOPHIL # BLD AUTO: 0 10E9/L (ref 0–0.7)
EOSINOPHIL NFR BLD AUTO: 0 %
ERYTHROCYTE [DISTWIDTH] IN BLOOD BY AUTOMATED COUNT: 13.3 % (ref 10–15)
GFR SERPL CREATININE-BSD FRML MDRD: 36 ML/MIN/1.7M2
GLUCOSE SERPL-MCNC: 129 MG/DL (ref 70–99)
HCT VFR BLD AUTO: 36.3 % (ref 35–47)
HGB BLD-MCNC: 11.4 G/DL (ref 11.7–15.7)
LYMPHOCYTES # BLD AUTO: 0.2 10E9/L (ref 0.8–5.3)
LYMPHOCYTES NFR BLD AUTO: 1 %
MCH RBC QN AUTO: 29.2 PG (ref 26.5–33)
MCHC RBC AUTO-ENTMCNC: 31.4 G/DL (ref 31.5–36.5)
MCV RBC AUTO: 93 FL (ref 78–100)
METAMYELOCYTES # BLD: 0.9 10E9/L
METAMYELOCYTES NFR BLD MANUAL: 4 %
MONOCYTES # BLD AUTO: 0.2 10E9/L (ref 0–1.3)
MONOCYTES NFR BLD AUTO: 1 %
NEUTROPHILS # BLD AUTO: 20.1 10E9/L (ref 1.6–8.3)
NEUTROPHILS NFR BLD AUTO: 94 %
NRBC # BLD AUTO: 0.2 10*3/UL
NRBC BLD AUTO-RTO: 1 /100
PLATELET # BLD AUTO: 343 10E9/L (ref 150–450)
PLATELET # BLD EST: NORMAL 10*3/UL
POTASSIUM SERPL-SCNC: 4.2 MMOL/L (ref 3.4–5.3)
RBC # BLD AUTO: 3.91 10E12/L (ref 3.8–5.2)
RBC MORPH BLD: ABNORMAL
SODIUM SERPL-SCNC: 137 MMOL/L (ref 133–144)
TOXIC GRANULES BLD QL SMEAR: PRESENT
WBC # BLD AUTO: 21.4 10E9/L (ref 4–11)

## 2017-06-01 PROCEDURE — 97530 THERAPEUTIC ACTIVITIES: CPT | Mod: GP | Performed by: PHYSICAL THERAPIST

## 2017-06-01 PROCEDURE — 40000133 ZZH STATISTIC OT WARD VISIT

## 2017-06-01 PROCEDURE — 94640 AIRWAY INHALATION TREATMENT: CPT

## 2017-06-01 PROCEDURE — 25000132 ZZH RX MED GY IP 250 OP 250 PS 637: Mod: GY | Performed by: PHYSICIAN ASSISTANT

## 2017-06-01 PROCEDURE — 97166 OT EVAL MOD COMPLEX 45 MIN: CPT | Mod: GO

## 2017-06-01 PROCEDURE — 97535 SELF CARE MNGMENT TRAINING: CPT | Mod: GO

## 2017-06-01 PROCEDURE — 80048 BASIC METABOLIC PNL TOTAL CA: CPT | Performed by: HOSPITALIST

## 2017-06-01 PROCEDURE — 99233 SBSQ HOSP IP/OBS HIGH 50: CPT | Performed by: HOSPITALIST

## 2017-06-01 PROCEDURE — 40000275 ZZH STATISTIC RCP TIME EA 10 MIN

## 2017-06-01 PROCEDURE — 25000128 H RX IP 250 OP 636: Performed by: PHYSICIAN ASSISTANT

## 2017-06-01 PROCEDURE — 25000125 ZZHC RX 250: Performed by: PHYSICIAN ASSISTANT

## 2017-06-01 PROCEDURE — 40000193 ZZH STATISTIC PT WARD VISIT: Performed by: PHYSICAL THERAPIST

## 2017-06-01 PROCEDURE — 36415 COLL VENOUS BLD VENIPUNCTURE: CPT | Performed by: HOSPITALIST

## 2017-06-01 PROCEDURE — 94640 AIRWAY INHALATION TREATMENT: CPT | Mod: 76

## 2017-06-01 PROCEDURE — 85025 COMPLETE CBC W/AUTO DIFF WBC: CPT | Performed by: HOSPITALIST

## 2017-06-01 PROCEDURE — 97116 GAIT TRAINING THERAPY: CPT | Mod: GP | Performed by: PHYSICAL THERAPIST

## 2017-06-01 PROCEDURE — 25000125 ZZHC RX 250: Performed by: INTERNAL MEDICINE

## 2017-06-01 PROCEDURE — 12000000 ZZH R&B MED SURG/OB

## 2017-06-01 PROCEDURE — A9270 NON-COVERED ITEM OR SERVICE: HCPCS | Mod: GY | Performed by: PHYSICIAN ASSISTANT

## 2017-06-01 PROCEDURE — 97161 PT EVAL LOW COMPLEX 20 MIN: CPT | Mod: GP | Performed by: PHYSICAL THERAPIST

## 2017-06-01 RX ADMIN — PREDNISONE 40 MG: 20 TABLET ORAL at 09:27

## 2017-06-01 RX ADMIN — IPRATROPIUM BROMIDE AND ALBUTEROL SULFATE 3 ML: .5; 3 SOLUTION RESPIRATORY (INHALATION) at 07:59

## 2017-06-01 RX ADMIN — IPRATROPIUM BROMIDE AND ALBUTEROL SULFATE 3 ML: .5; 3 SOLUTION RESPIRATORY (INHALATION) at 19:48

## 2017-06-01 RX ADMIN — QUINAPRIL HYDROCHLORIDE 20 MG: 10 TABLET, FILM COATED ORAL at 09:27

## 2017-06-01 RX ADMIN — HYDROCHLOROTHIAZIDE 12.5 MG: 12.5 CAPSULE ORAL at 09:27

## 2017-06-01 RX ADMIN — LEVOTHYROXINE SODIUM 50 MCG: 50 TABLET ORAL at 06:19

## 2017-06-01 RX ADMIN — SERTRALINE HYDROCHLORIDE 75 MG: 25 TABLET ORAL at 09:28

## 2017-06-01 RX ADMIN — AZITHROMYCIN 250 MG: 250 TABLET, FILM COATED ORAL at 21:55

## 2017-06-01 RX ADMIN — ASPIRIN 325 MG ORAL TABLET 325 MG: 325 PILL ORAL at 09:26

## 2017-06-01 RX ADMIN — CEFTRIAXONE 2 G: 2 INJECTION, POWDER, FOR SOLUTION INTRAMUSCULAR; INTRAVENOUS at 19:42

## 2017-06-01 RX ADMIN — IPRATROPIUM BROMIDE AND ALBUTEROL SULFATE 3 ML: .5; 3 SOLUTION RESPIRATORY (INHALATION) at 12:01

## 2017-06-01 RX ADMIN — SIMVASTATIN 40 MG: 40 TABLET, FILM COATED ORAL at 19:46

## 2017-06-01 RX ADMIN — DONEPEZIL HYDROCHLORIDE 5 MG: 5 TABLET, FILM COATED ORAL at 19:46

## 2017-06-01 RX ADMIN — IPRATROPIUM BROMIDE AND ALBUTEROL SULFATE 3 ML: .5; 3 SOLUTION RESPIRATORY (INHALATION) at 15:57

## 2017-06-01 ASSESSMENT — ACTIVITIES OF DAILY LIVING (ADL): PREVIOUS_RESPONSIBILITIES: MEAL PREP;LAUNDRY;SHOPPING;DRIVING

## 2017-06-01 NOTE — PLAN OF CARE
Problem: Goal Outcome Summary  Goal: Goal Outcome Summary  Outcome: Improving  VSS, currently on 2 lpm nc. Denied pain. Ls coarse w/ exp wheezing. IS use/deep breathing/coughing encouraged. Denied sob, dyspnea w/ exertion evident. Congested cough. Up w/ SBA to restroom. Saline locked. A&Ox4 during assessment, forgetful at times. Fair appetite. Denied nicotine patch

## 2017-06-01 NOTE — PLAN OF CARE
Problem: Goal Outcome Summary  Goal: Goal Outcome Summary  Outcome: Improving  VSS, afebrile, 99% on 2 liters oxygen   desats to 80's without oxygen   denies pain, alert and oriented but forgetful   Lung sounds course, REED with non-productive cough   up with SBA, reports feeling very weak,   alarms on for safety  PT/OT evals today

## 2017-06-01 NOTE — PROGRESS NOTES
[facility]    Hospitalist Progress Note    Date of Service (when I saw the patient): 06/01/2017  Provider:  Rey Desai MD     Initial presenting complaint/issue to hospital (Diagnosis): shortness of breath and cough   Assessment & Plan   Mel Lazaro is a 87 year old female with a PMH significant for HTN, CKD, HLP, hypothyroidism, mild Alzheimer's disease and current tobacco use, who presents with cough and SOB.     1. Community acquired pneumonia - 3-4 days of increase cough and SOB. No fever, no chest pain just dry cough. Hypoxemic on room air at urgent care, % on 2L O2 here. Received 125 mg IV solu-medrol.  - On Rocephin and Azithromycin. Afebrile, good general status, not toxemic. WBC is higher, suspect 2/2 to steroid in part.  - CR and PCT elevated, recheck tomorrow for f/u  - Scheduled Duonebs and PO Prednisone, 40 mg daily.   - Wean O2 as able.    2. Current tobacco use, likely underlying undiagnosed COPD - smokes not quite 1 pack per day, no formal dx of COPD but likely has this underlying given hyperinflation on CXR.    3. CKD stage III - Cr 1.40 (1.53) saline lock. Encourage po intake. Avoid nephrotoxins  4. HTN -  home meds with parameters.  5. Hypothyroidism - resume home meds  6. HLP - home meds  7. Mild Alzheimer's disease - on aricept. Pt still lives independently. Cognitive assessment requested.   8. Hyponatremia - resolved.         DVT Prophylaxis: Pneumatic Compression Devices  Code Status: Full Code    Disposition: Expected discharge in 2 - 3 days once not in need for O2 sup.    Interval History   She feels much better, no SOB, no fever. Wet cough now. Low appetite. Able to use toilet independently.     -Data reviewed today: I reviewed all new labs and imaging results over the last 24 hours. I personally reviewed the EKG tracing showing NSR.    Physical Exam   Temp: 95.7  F (35.4  C) Temp src: Oral BP: 149/65 Pulse: 81 Heart Rate: 67 Resp: 20 SpO2: 95 % O2 Device:  Nasal cannula Oxygen Delivery: 2 LPM  Vitals:    05/30/17 1928 05/31/17 0407   Weight: 60.8 kg (134 lb) 57.8 kg (127 lb 6.4 oz)     Vital Signs with Ranges  Temp:  [95.4  F (35.2  C)-96  F (35.6  C)] 95.7  F (35.4  C)  Pulse:  [81] 81  Heart Rate:  [67-86] 67  Resp:  [16-20] 20  BP: ()/(52-65) 149/65  SpO2:  [85 %-99 %] 95 %  I/O last 3 completed shifts:  In: 1823 [P.O.:800; I.V.:1023]  Out: -     GEN:  Alert, cooperative, forgetfull, NAD.  HEENT:  Normocephalic/atraumatic, no scleral icterus, no nasal discharge, mouth moist.  CV:  Regular rate and rhythm, no murmur or JVD.  S1 + S2 noted, no S3 or S4.  LUNGS: Soundscoarse to auscultation bilaterally with crackles LLB.  Symmetric chest rise on inhalation noted.  ABD:  Active bowel sounds, soft, non-tender/non-distended.  No rebound/guarding/rigidity.  EXT:  No edema or cyanosis.  No joint synovitis noted.  SKIN:  Dry to touch, no exanthems noted in the visualized areas.       Medications        ipratropium - albuterol 0.5 mg/2.5 mg/3 mL  3 mL Nebulization 4x Daily     aspirin  325 mg Oral Daily     donepezil  5 mg Oral QPM     hydrochlorothiazide  12.5 mg Oral Daily     levothyroxine (SYNTHROID/LEVOTHROID) tablet 50 mcg  50 mcg Oral QAM AC     quinapril  20 mg Oral Daily     sertraline  75 mg Oral Daily     simvastatin  40 mg Oral QPM     cefTRIAXone  2 g Intravenous Q24H     azithromycin  250 mg Oral Q24H     sodium chloride (PF)  3 mL Intracatheter Q8H     predniSONE  40 mg Oral Daily     nicotine   Transdermal Q8H     nicotine   Transdermal Daily       Data     Recent Labs  Lab 06/01/17 0628 05/31/17 0628 05/30/17 2026   WBC 21.4* 10.4 10.4   HGB 11.4* 11.4* 12.3   MCV 93 92 90    269 285    133 131*   POTASSIUM 4.2 3.7 3.6   CHLORIDE 102 100 95   CO2 27 24 26   BUN 57* 45* 49*   CR 1.40* 1.29* 1.53*   ANIONGAP 8 9 10   JOE 8.3* 8.2* 9.0   * 134* 115*       No results found for this or any previous visit (from the past 24  hour(s)).          Disclaimer: This note consists of symbols derived from keyboarding, dictation and/or voice recognition software. As a result, there may be errors in the script that have gone undetected. Please consider this when interpreting information found in this chart.

## 2017-06-01 NOTE — PROGRESS NOTES
06/01/17 1352   Quick Adds   Type of Visit Initial PT Evaluation   Living Environment   Lives With alone   Living Arrangements house   Home Accessibility stairs within home   Number of Stairs to Enter Home 1  (no rail)   Number of Stairs Within Home 12  (basement laundry)   Transportation Available car;family or friend will provide   Living Environment Comment Pt lives alone, has 3 daughters to assist with medical and finacial needs. Daughters provide rt[place]port to MD appointments, pt drives self to [employer] and [employer]. Stands in shower, standard toilets   Self-Care   Usual Activity Tolerance moderate   Current Activity Tolerance fair   Regular Exercise no   Equipment Currently Used at Home none   Activity/Exercise/Self-Care Comment patient reports independence with mobility at baseline   Functional Level Prior   Ambulation 0-->independent   Transferring 0-->independent   Toileting 0-->independent   Bathing 0-->independent   Dressing 0-->independent   Eating 0-->independent  (pre-made microwaveable meals)   Communication 0-->understands/communicates without difficulty   Swallowing 0-->swallows foods/liquids without difficulty   Cognition 1 - attention or memory deficits  (Alzheimer's noted in chart)   Fall history within last six months no   Which of the above functional risks had a recent onset or change? ambulation;transferring;toileting   Prior Functional Level Comment Daughters assist with medications. Pt reports I with ADLS, driving, cleaning, and medication administration.    General Information   Onset of Illness/Injury or Date of Surgery - Date 05/30/17   Referring Physician Rey Desai MD   Patient/Family Goals Statement not stated   Pertinent History of Current Problem (include personal factors and/or comorbidities that impact the POC) per chart: Mel Lazaro is a 87 year old female with a PMH significant for HTN, CKD, HLP, hypothyroidism, mild Alzheimer's disease and current tobacco use, who  presents with cough and SOB found to have CAP   Precautions/Limitations fall precautions;oxygen therapy device and L/min  (2L)   General Observations patient very forgetful   General Info Comments O2 sats 94% on 2L; HR 80; decreased O2 sats to 76% on room air; 4 minutes on 2L to recover to 90's   Cognitive Status Examination   Orientation person  (unable to pick out location or state; knew month; not year)   Level of Consciousness alert   Follows Commands and Answers Questions 100% of the time   Personal Safety and Judgment impaired   Memory impaired   Cognitive Comment difficulty remembering PLOF;   Pain Assessment   Patient Currently in Pain No   Posture    Posture Comments forward flexed   Range of Motion (ROM)   ROM Comment WFL   Strength   Strength Comments functional weakness noted with mobility; decreased activity tolerance   Bed Mobility   Bed Mobility Comments Patient SBA for bed mobility with HOB elevated   Transfer Skills   Transfer Comments sit<>stand with CGA; no device; mild unsteadiness   Gait   Gait Comments gait in hallway > 50 feet with no assistive device and multiple losses of balance; significant lateral path deviations; patient grabbing for ortega rails; would benefit from a walker   Balance   Balance Comments impaired; significant path deviations during gait   Sensory Examination   Sensory Perception Comments intact   General Therapy Interventions   Planned Therapy Interventions balance training;gait training;strengthening;transfer training;progressive activity/exercise   Clinical Impression   Criteria for Skilled Therapeutic Intervention yes, treatment indicated   PT Diagnosis weakness; impaired gait/transfers   Influenced by the following impairments decreased activity tolerance, weakness; impaired balance; impaired cognition   Functional limitations due to impairments impaired independence with functional mobility   Clinical Presentation Stable/Uncomplicated   Clinical Presentation Rationale  "min A or less for mobility; impaired cognition; CAP; lives alone   Clinical Decision Making (Complexity) Low complexity   Therapy Frequency` daily   Predicted Duration of Therapy Intervention (days/wks) 4 days   Anticipated Equipment Needs at Discharge front wheeled walker   Anticipated Discharge Disposition Transitional Care Facility   Risk & Benefits of therapy have been explained Yes   Patient, Family & other staff in agreement with plan of care Yes   [facility] TM \"6 Clicks\"   2016, Trustees of [facility], under license to [email or web address].  All rights reserved.   6 Clicks Short Forms Basic Mobility Inpatient Short Form   [facility]  \"6 Clicks\" V.2 Basic Mobility Inpatient Short Form   1. Turning from your back to your side while in a flat bed without using bedrails? 4 - None   2. Moving from lying on your back to sitting on the side of a flat bed without using bedrails? 3 - A Little   3. Moving to and from a bed to a chair (including a wheelchair)? 3 - A Little   4. Standing up from a chair using your arms (e.g., wheelchair, or bedside chair)? 3 - A Little   5. To walk in hospital room? 3 - A Little   6. Climbing 3-5 steps with a railing? 2 - A Lot   Basic Mobility Raw Score (Score out of 24.Lower scores equate to lower levels of function) 18   Total Evaluation Time   Total Evaluation Time (Minutes) 15     "

## 2017-06-01 NOTE — PROGRESS NOTES
I updated Dtg Tanya on OT eval.  She will discuss options with her other 2 sisters. Her sister Hemalatha is a teacher and will be off of work starting Friday.  She will be able to stay with her mother if needed.  I email faxed OT eval to [email or web address].  She is aware a RN CTS or SW will f/u with her closer to [place].  We did discuss Home Care PT/RN/OT w/ cognitive eval in her own environment options.  Luisa RAINES CTS [id number]

## 2017-06-01 NOTE — PLAN OF CARE
"Problem: Goal Outcome Summary  Goal: Goal Outcome Summary  OT: Orders received, eval completed. Pt lives alone in [facility] with ADLs and medication administration, cleaning, light meal prep with microwave, and driving. Pt has daughters who A with medical and financial needs. Pt uses no AD at baseline, nor 02. Pt with hx of mild Alzheimer's      Discharge Planner OT   Patient plan for discharge: None documented, PATRICE reports pt to d/c to family's home with A for 1 week, then d/c home with son's A for 3 additional weeks  Current status: Pt oriented to person, \"hospital\", but not to situation or date/month. Pt attempts to cover for imparied cognition. Pt on 2L 02, 91% at rest, educated on PLB- able to complete with cues. Bed mobility from reclined HOB I, pt able to doff/don socks with S. Sit/stand with CGA with no AD. Functional mobility in room with no AD and MIN A due to deviated path. Toileting transfer with elevated toilet and grab bar with CGA. Pt sat without doffing underwear to complete toileting task- cues required and CGA to complete. Pt unable to sequence next step of toileting, required set up of toilet paper and cues. Pt to return back to bed, educated on hand hygiene, pt required cues to sequenc with use of soap and towels. Returned to bed, pt without 02 for toileting task, dropped to high 70s to low 80s, increased to 3L with cues for PLB to raise to 95%. Returned to 2L, pt able to stay at 95%.  Barriers to return to prior living situation: Pt lives alone and drives.   Recommendations for discharge: Recommend 24 hour supervision/A at this time. Pending family's ability to provide, pt may benefit from short TCU stay and higher level of care at d/c ([facility] with services). Recommend cognitive assessment-  if pt to d/c home with family- defer CPT to OP/home. if pt to d/c home alone, CPT. Recommend driving assessment.   Rationale for recommendations: Pt presents with impaired ADLs and functional mobility below " baseline as well as with impaired cognition/safety/sequencing for howie tasks.        Entered by: Nikia Garza 06/01/2017 10:41 AM

## 2017-06-01 NOTE — PLAN OF CARE
Problem: Goal Outcome Summary  Goal: Goal Outcome Summary  PT: Order received; Initial evaluation completed and treatment initiated. Patient admitted from home with community acquired pneumonia; Reports no use of an assistive device and no recent falls. Noted to have Alzheimer's per chart and assist from family for some ADL's; Patient still drives per chart.  Discharge Planner PT   Patient plan for discharge: Home with assist of family? Per chart  Current status: Patient SBA for bed mobility; min A for gait/transfers without an assistive device; impaired balance with significant path deviations and losses of balance; impaired cognition; difficulty processing information noted; only oriented to self and month during session. O2 sats 94% at rest on 2L and 76% on room air with activity; took 4 minutes to recover to 90's on 2L  Barriers to return to prior living situation: will need 24/7 assist for impaired cognition and mobility currently; stairs to basement laundry; doesn't appear safe to drive  Recommendations for discharge: Currently recommend TCU with transition to a more supportive living environment (group home)  Rationale for recommendations: functional weakness, impaired balance, decreased activity tolerance; impaired cognition; decreased safety awareness; current need for assist       Entered by: Vilma Goldman 06/01/2017 2:46 PM

## 2017-06-01 NOTE — PLAN OF CARE
Problem: Goal Outcome Summary  Goal: Goal Outcome Summary  Outcome: Improving  Radha RN  VS monitored, 2L NC, desats to 80's on 2L with short distance in room, recovers to 92-93% in approx 2 minutes, encourage use of IS, LS coarse/diminshed/exp wheeze, Prednisone/sched nebs cont, PO Zithromax/IV Rocephin cont, voiding, forgetful, unable to recall year and president, possible pt uses humor to cover up some forgetfulness, sputum culture needed, frequent/non-productive cough, up w/SBA and gait belt, will cont to monitor.

## 2017-06-01 NOTE — PROGRESS NOTES
06/01/17 0900   Quick Adds   Type of Visit Initial Occupational Therapy Evaluation   Living Environment   Lives With alone   Living Arrangements house   Home Accessibility stairs within home  (Laundry basement)   Number of Stairs to Enter Home 1  (No railing)   Number of Stairs Within Home 12   Transportation Available car;family or friend will provide   Living Environment Comment Pt lives alone, has 3 daughters to assist with medical and finacial needs. Daughters provide rtansport to MD appointments, pt drives self to [employer] and [employer]. Stands in shower, standard toilets   Self-Care   Dominant Hand right   Usual Activity Tolerance moderate   Current Activity Tolerance fair   Regular Exercise no   Equipment Currently Used at Home none   Functional Level Prior   Ambulation 0-->independent   Transferring 0-->independent   Toileting 0-->independent   Bathing 0-->independent   Dressing 0-->independent   Eating 0-->independent  (Pre-made microwavable meals)   Communication 0-->understands/communicates without difficulty   Swallowing 0-->swallows foods/liquids without difficulty   Prior Functional Level Comment Daughters assist with medications. Pt reports I with ADLS, driving, cleaning, and medication administration.    General Information   Onset of Illness/Injury or Date of Surgery - Date 06/01/17   Referring Physician Lloyd   Patient/Family Goals Statement to d/c to family's home for 1 week, then return to home with son A for 3 weeks   Additional Occupational Profile Info/Pertinent History of Current Problem Mel Lazaro is a 87 year old female with a PMH significant for HTN, CKD, HLP, hypothyroidism, mild Alzheimer's disease and current tobacco use, who presents with cough and SOB found to have CAP   Precautions/Limitations fall precautions;oxygen therapy device and L/min  (2L at eval, none at baseline)   General Observations Pt agreeable to OT, PATRICE present   Cognitive Status Examination   Orientation  "person;place  (\"hospital\")   Level of Consciousness alert;confused   Able to Follow Commands mild impairment;success, 1-step commands   Personal Safety (Cognitive) decreased awareness, need for assist;decreased awareness, need for safety;decreased insight to deficits;unaware of cognitive deficits   Memory impaired   Organization/Problem Solving Sequencing impaired   Executive Function Planning ability impaired   Cognitive Comment Hx of mild Alzheimer's noted.    Visual Perception   Visual Perception Wears glasses   Pain Assessment   Patient Currently in Pain No   Range of Motion (ROM)   ROM Quick Adds No deficits were identified   Strength   Strength Comments General age related weakness, functional   Hand Strength   Hand Strength Comments B good   Mobility   Bed Mobility Comments Independent   Transfer Skill: Bed to Chair/Chair to Bed   Level of [place]: Bed to Chair minimum assist (75% patients effort)   Physical Assist/Nonphysical Assist: Bed to Chair supervision;verbal cues   Transfer Skill: Sit to Stand   Level of [place]: Sit/Stand contact guard   Physical Assist/Nonphysical Assist: Sit/Stand supervision;verbal cues   Transfer Skill: Toilet Transfer   Level of [place]: Toilet contact guard   Physical Assist/Nonphysical Assist: Toilet supervision;verbal cues   Assistive Device grab bars   Lower Body Dressing   Level of [place]: Dress Lower Body contact guard   Toileting   Level of [place]: Toilet contact guard   Physical Assist/Nonphysical Assist: Toilet supervision;verbal cues  (sequencing A)   Grooming   Level of [place]: Grooming stand-by assist   Physical Assist/Nonphysical Assist: Grooming supervision  (sequencing A)   Instrumental Activities of Daily Living (IADL)   Previous Responsibilities meal prep;laundry;shopping;driving   Activities of Daily Living Analysis   Impairments Contributing to Impaired Activities of Daily Living balance impaired;cognition impaired;strength " "decreased  (activity tolerance, SOB/02 requirment)   General Therapy Interventions   Planned Therapy Interventions ADL retraining;IADL retraining;cognition;progressive activity/exercise;transfer training;risk factor education   Clinical Impression   Criteria for Skilled Therapeutic Interventions Met yes, treatment indicated   OT Diagnosis Impaired ADLs/IADLs, functional mobility, cognition/safety   Influenced by the following impairments Impaired balance, cogniton/safety/sequencing, activity tolerance, 02 requirments   Assessment of Occupational Performance 3-5 Performance Deficits   Identified Performance Deficits Sequencing of basic ADLs, safety/cognition for home management tasks, driving   Clinical Decision Making (Complexity) Moderate complexity   Therapy Frequency daily   Predicted Duration of Therapy Intervention (days/wks) 3-5 days   Anticipated Discharge Disposition Home with Assist;Home with Home Therapy;Home with Outpatient Therapy;Other (see comments)  (may require higher level of care)   Risks and Benefits of Treatment have been explained. Yes   Patient, Family & other staff in agreement with plan of care Yes   [facility] TM \"6 Clicks\"   2016, Trustees of [facility], under license to [employer].  All rights reserved.   6 Clicks Short Forms Daily Activity Inpatient Short Form   [facility]  \"6 Clicks\" Daily Activity Inpatient Short Form   1. Putting on and taking off regular lower body clothing? 3 - A Little   2. Bathing (including washing, rinsing, drying)? 2 - A Lot   3. Toileting, which includes using toilet, bedpan or urinal? 3 - A Little   4. Putting on and taking off regular upper body clothing? 3 - A Little   5. Taking care of personal grooming such as brushing teeth? 3 - A Little   6. Eating meals? 4 - None   Daily Activity Raw Score (Score out of 24.Lower scores equate to lower levels of function) 18   Total Evaluation Time   Total Evaluation Time (Minutes) 10 "

## 2017-06-02 ENCOUNTER — APPOINTMENT (OUTPATIENT)
Dept: PHYSICAL THERAPY | Facility: CLINIC | Age: 82
DRG: 190 | End: 2017-06-02
Payer: MEDICARE

## 2017-06-02 ENCOUNTER — APPOINTMENT (OUTPATIENT)
Dept: OCCUPATIONAL THERAPY | Facility: CLINIC | Age: 82
DRG: 190 | End: 2017-06-02
Payer: MEDICARE

## 2017-06-02 LAB
ACANTHOCYTES BLD QL SMEAR: SLIGHT
ANION GAP SERPL CALCULATED.3IONS-SCNC: 5 MMOL/L (ref 3–14)
BASOPHILS # BLD AUTO: 0 10E9/L (ref 0–0.2)
BASOPHILS NFR BLD AUTO: 0 %
BUN SERPL-MCNC: 58 MG/DL (ref 7–30)
CALCIUM SERPL-MCNC: 8.2 MG/DL (ref 8.5–10.1)
CHLORIDE SERPL-SCNC: 105 MMOL/L (ref 94–109)
CO2 SERPL-SCNC: 29 MMOL/L (ref 20–32)
CREAT SERPL-MCNC: 1.33 MG/DL (ref 0.52–1.04)
CRP SERPL-MCNC: 89.5 MG/L (ref 0–8)
DIFFERENTIAL METHOD BLD: ABNORMAL
EOSINOPHIL # BLD AUTO: 0 10E9/L (ref 0–0.7)
EOSINOPHIL NFR BLD AUTO: 0 %
ERYTHROCYTE [DISTWIDTH] IN BLOOD BY AUTOMATED COUNT: 13.6 % (ref 10–15)
GFR SERPL CREATININE-BSD FRML MDRD: 38 ML/MIN/1.7M2
GLUCOSE SERPL-MCNC: 103 MG/DL (ref 70–99)
HCT VFR BLD AUTO: 33.8 % (ref 35–47)
HGB BLD-MCNC: 10.5 G/DL (ref 11.7–15.7)
LYMPHOCYTES # BLD AUTO: 0.7 10E9/L (ref 0.8–5.3)
LYMPHOCYTES NFR BLD AUTO: 4 %
MCH RBC QN AUTO: 29.7 PG (ref 26.5–33)
MCHC RBC AUTO-ENTMCNC: 31.1 G/DL (ref 31.5–36.5)
MCV RBC AUTO: 96 FL (ref 78–100)
METAMYELOCYTES # BLD: 0.4 10E9/L
METAMYELOCYTES NFR BLD MANUAL: 2 %
MONOCYTES # BLD AUTO: 1.4 10E9/L (ref 0–1.3)
MONOCYTES NFR BLD AUTO: 8 %
MYELOCYTES # BLD: 1.4 10E9/L
MYELOCYTES NFR BLD MANUAL: 8 %
NEUTROPHILS # BLD AUTO: 14.1 10E9/L (ref 1.6–8.3)
NEUTROPHILS NFR BLD AUTO: 78 %
PLATELET # BLD AUTO: 305 10E9/L (ref 150–450)
PLATELET # BLD EST: NORMAL 10*3/UL
POIKILOCYTOSIS BLD QL SMEAR: SLIGHT
POTASSIUM SERPL-SCNC: 4 MMOL/L (ref 3.4–5.3)
PROCALCITONIN SERPL-MCNC: 3.49 NG/ML
RBC # BLD AUTO: 3.53 10E12/L (ref 3.8–5.2)
SODIUM SERPL-SCNC: 139 MMOL/L (ref 133–144)
TOXIC GRANULES BLD QL SMEAR: PRESENT
WBC # BLD AUTO: 18.1 10E9/L (ref 4–11)

## 2017-06-02 PROCEDURE — 12000000 ZZH R&B MED SURG/OB

## 2017-06-02 PROCEDURE — 80048 BASIC METABOLIC PNL TOTAL CA: CPT | Performed by: HOSPITALIST

## 2017-06-02 PROCEDURE — 25000128 H RX IP 250 OP 636: Performed by: PHYSICIAN ASSISTANT

## 2017-06-02 PROCEDURE — 94640 AIRWAY INHALATION TREATMENT: CPT | Mod: 76

## 2017-06-02 PROCEDURE — 84145 PROCALCITONIN (PCT): CPT | Performed by: HOSPITALIST

## 2017-06-02 PROCEDURE — 40000193 ZZH STATISTIC PT WARD VISIT

## 2017-06-02 PROCEDURE — 85025 COMPLETE CBC W/AUTO DIFF WBC: CPT | Performed by: HOSPITALIST

## 2017-06-02 PROCEDURE — 99233 SBSQ HOSP IP/OBS HIGH 50: CPT | Performed by: INTERNAL MEDICINE

## 2017-06-02 PROCEDURE — 25000125 ZZHC RX 250: Performed by: INTERNAL MEDICINE

## 2017-06-02 PROCEDURE — 40000275 ZZH STATISTIC RCP TIME EA 10 MIN

## 2017-06-02 PROCEDURE — 36415 COLL VENOUS BLD VENIPUNCTURE: CPT | Performed by: HOSPITALIST

## 2017-06-02 PROCEDURE — A9270 NON-COVERED ITEM OR SERVICE: HCPCS | Mod: GY | Performed by: PHYSICIAN ASSISTANT

## 2017-06-02 PROCEDURE — 97535 SELF CARE MNGMENT TRAINING: CPT | Mod: GO

## 2017-06-02 PROCEDURE — 40000133 ZZH STATISTIC OT WARD VISIT

## 2017-06-02 PROCEDURE — 97110 THERAPEUTIC EXERCISES: CPT | Mod: GP

## 2017-06-02 PROCEDURE — 94640 AIRWAY INHALATION TREATMENT: CPT

## 2017-06-02 PROCEDURE — 25000125 ZZHC RX 250: Performed by: PHYSICIAN ASSISTANT

## 2017-06-02 PROCEDURE — 25000132 ZZH RX MED GY IP 250 OP 250 PS 637: Mod: GY | Performed by: PHYSICIAN ASSISTANT

## 2017-06-02 PROCEDURE — 97530 THERAPEUTIC ACTIVITIES: CPT | Mod: GP

## 2017-06-02 PROCEDURE — 86140 C-REACTIVE PROTEIN: CPT | Performed by: HOSPITALIST

## 2017-06-02 RX ADMIN — HYDROCHLOROTHIAZIDE 12.5 MG: 12.5 CAPSULE ORAL at 09:06

## 2017-06-02 RX ADMIN — AZITHROMYCIN 250 MG: 250 TABLET, FILM COATED ORAL at 21:26

## 2017-06-02 RX ADMIN — PREDNISONE 40 MG: 20 TABLET ORAL at 09:05

## 2017-06-02 RX ADMIN — IPRATROPIUM BROMIDE AND ALBUTEROL SULFATE 3 ML: .5; 3 SOLUTION RESPIRATORY (INHALATION) at 07:51

## 2017-06-02 RX ADMIN — QUINAPRIL HYDROCHLORIDE 20 MG: 10 TABLET, FILM COATED ORAL at 09:05

## 2017-06-02 RX ADMIN — IPRATROPIUM BROMIDE AND ALBUTEROL SULFATE 3 ML: .5; 3 SOLUTION RESPIRATORY (INHALATION) at 19:52

## 2017-06-02 RX ADMIN — SIMVASTATIN 40 MG: 40 TABLET, FILM COATED ORAL at 20:01

## 2017-06-02 RX ADMIN — DONEPEZIL HYDROCHLORIDE 5 MG: 5 TABLET, FILM COATED ORAL at 20:01

## 2017-06-02 RX ADMIN — IPRATROPIUM BROMIDE AND ALBUTEROL SULFATE 3 ML: .5; 3 SOLUTION RESPIRATORY (INHALATION) at 15:47

## 2017-06-02 RX ADMIN — SERTRALINE HYDROCHLORIDE 75 MG: 25 TABLET ORAL at 09:05

## 2017-06-02 RX ADMIN — CEFTRIAXONE 2 G: 2 INJECTION, POWDER, FOR SOLUTION INTRAMUSCULAR; INTRAVENOUS at 20:01

## 2017-06-02 RX ADMIN — LEVOTHYROXINE SODIUM 50 MCG: 50 TABLET ORAL at 06:21

## 2017-06-02 RX ADMIN — ASPIRIN 325 MG ORAL TABLET 325 MG: 325 PILL ORAL at 09:04

## 2017-06-02 RX ADMIN — IPRATROPIUM BROMIDE AND ALBUTEROL SULFATE 3 ML: .5; 3 SOLUTION RESPIRATORY (INHALATION) at 11:50

## 2017-06-02 NOTE — PLAN OF CARE
Problem: Goal Outcome Summary  Goal: Goal Outcome Summary  Outcome: Improving  Ambulatory Status:  Pt up  A1.  VS:  WNL  Pain:  denies  Resp: LS coarse  GI:  denies nausea.  Regular diet.  BS hypoactive.  Passing flatus.  Last BM 5/30.  Tx:  Rocephin and PO Azithromycin  Consults:  PT/OT  Disposition:  TBD

## 2017-06-02 NOTE — PROGRESS NOTES
[facility]    Hospitalist Progress Note    Date of Service (when I saw the patient): 06/02/2017  Provider:  Jerome Rubin MD     Initial presenting complaint/issue to hospital (Diagnosis): shortness of breath and cough   Assessment & Plan   Mel Lazaro is a 87 year old female with a PMH significant for HTN, CKD, HLP, hypothyroidism, mild Alzheimer's disease and current tobacco use, who presents with cough and SOB. She had 3-4 days of increased cough and SOB. No fever, no chest pain.. Hypoxemic on room air at urgent care, % on 2L O2 here. Received 125 mg IV solu-medrol in the ER. She was put on oral prednisone     1. Community acquired pneumonia -   - Afebrile, good general status, not toxemic. WBC is higher, suspect 2/2 to steroid in part.Procalcitonin 7.24 on 5/31.   - Will continue Rocephin and Azithromycin.  - Scheduled Duonebs and PO Prednisone, 40 mg daily.   - Wean O2 as able.      2. Current tobacco use, likely underlying undiagnosed COPD - smokes less than  1 pack per day, no formal dx of COPD but likely has this underlying given hyperinflation on CXR. Continue nebs, steroids.     3. CKD stage III - Cr 1.40 (1.53) saline lock. Encourage po intake. Avoid nephrotoxins    4. HTN -  home meds with parameters.    5. Hypothyroidism - resume home meds    6. HLP - home meds    7. Mild Alzheimer's disease - on aricept. Pt still lives independently. Cognitive assessment requested.     8. Hyponatremia - resolved.      DVT Prophylaxis: Pneumatic Compression Devices    Code Status: Full Code    Disposition: Expected discharge in 2 - 3 days once not in need for O2 sup.    Interval History   Patient claims that she is feeling better. Still on oxygen supplement. Shortness of breath getting better.     -Data reviewed today: I reviewed all new labs and imaging results over the last 24 hours. I personally reviewed the EKG tracing showing NSR.    Physical Exam   Temp: 97.6  F (36.4  C) Temp src: Oral  BP: 111/45   Heart Rate: 64 Resp: 20 SpO2: 95 % O2 Device: Nasal cannula Oxygen Delivery: 2 LPM  Vitals:    05/30/17 1928 05/31/17 0407   Weight: 60.8 kg (134 lb) 57.8 kg (127 lb 6.4 oz)     Vital Signs with Ranges  Temp:  [96  F (35.6  C)-97.6  F (36.4  C)] 97.6  F (36.4  C)  Heart Rate:  [64-80] 64  Resp:  [18-20] 20  BP: (111-134)/(45-54) 111/45  SpO2:  [88 %-96 %] 95 %  I/O last 3 completed shifts:  In: 753 [P.O.:750; I.V.:3]  Out: -     GEN:  Alert, cooperative, forgetfull, NAD.  HEENT:  Normocephalic/atraumatic, no scleral icterus, no nasal discharge, mouth moist.  CV:  Regular rate and rhythm, no murmur or JVD.  S1 + S2 noted, no S3 or S4.  LUNGS: Soundscoarse to auscultation bilaterally with crackles LLB.  Symmetric chest rise on inhalation noted.  ABD:  Active bowel sounds, soft, non-tender/non-distended.  No rebound/guarding/rigidity.  EXT:  No edema or cyanosis.  No joint synovitis noted.  SKIN:  Dry to touch, no exanthems noted in the visualized areas.       Medications        ipratropium - albuterol 0.5 mg/2.5 mg/3 mL  3 mL Nebulization 4x Daily     aspirin  325 mg Oral Daily     donepezil  5 mg Oral QPM     hydrochlorothiazide  12.5 mg Oral Daily     levothyroxine (SYNTHROID/LEVOTHROID) tablet 50 mcg  50 mcg Oral QAM AC     quinapril  20 mg Oral Daily     sertraline  75 mg Oral Daily     simvastatin  40 mg Oral QPM     cefTRIAXone  2 g Intravenous Q24H     azithromycin  250 mg Oral Q24H     sodium chloride (PF)  3 mL Intracatheter Q8H     predniSONE  40 mg Oral Daily     nicotine   Transdermal Q8H     nicotine   Transdermal Daily       Data     Recent Labs  Lab 06/02/17  0618 06/01/17  0628 05/31/17  0628   WBC 18.1* 21.4* 10.4   HGB 10.5* 11.4* 11.4*   MCV 96 93 92    343 269    137 133   POTASSIUM 4.0 4.2 3.7   CHLORIDE 105 102 100   CO2 29 27 24   BUN 58* 57* 45*   CR 1.33* 1.40* 1.29*   ANIONGAP 5 8 9   JOE 8.2* 8.3* 8.2*   * 129* 134*       No results found for this or any  previous visit (from the past 24 hour(s)).

## 2017-06-02 NOTE — PLAN OF CARE
Problem: Goal Outcome Summary  Goal: Goal Outcome Summary  Discharge Planner OT   Patient plan for discharge: Not documented, possibly home with family  Current status: Nursing cleaning room as it appears that pt urinated on floor vs in bathroom. Pt agreeable to OT, declined any ADLS this AM with exception of washing face. Pt declined mobility to bathroom to complete sinkside, OT set up at chair. Pt unable to report if she used 02 at home, unable to state how she was feeling today, and unable to state name of hospital or situation. Pt oriented to year, not month or date. Pt participated in SLUMS, scored 16/30 indicating severe cognitive deficits/dementia range. Based on score and clinical judgement from functional tasks/conversation with pt, recommending 24 hour supervision and assist for safety and completion of ADLS. Pt continued to state that she's an independent person who can complete daily tasks on her own; poor insight into current cognitive and functional deficits. Encouraged pt to sit in chair with chair alarm/tabs but requested she wait for therapist to obtain walker and gait belt, pt unable to follow cues and impulsively transfered with CGA from OT. Tabs and chair alarm on. Call light within reach.   Barriers to return to prior living situation: Pt lives alone.   Recommendations for discharge: Recommend 24 hour supervision/A at this time. Pending family's ability to provide, pt may benefit from short TCU stay and higher level of care at d/c ([facility] with services). Recommend cognitive assessment-  if pt to d/c home with family- defer CPT to OP/home. if pt to d/c home alone, CPT. Recommend driving assessment.   Rationale for recommendations: Based on score and clinical judgement from functional tasks/conversation with pt, recommending 24 hour supervision and assist for safety and completion of ADLS.    Entered by: Nikia Garza 06/02/2017 8:38 AM

## 2017-06-02 NOTE — PLAN OF CARE
Problem: Goal Outcome Summary  Goal: Goal Outcome Summary  Outcome: No Change  VSS. Denied pain. Up w/ SBA and walker. Alarms on. LS w/ fine crackles, IS use encouraged. Deep breathing/coughing encouraged. Continues on 2 lpm. Denied SOB. Some dyspnea w/ exertion. A&O x3, however, orientation waxes and jewell. Continues on IV rocephin, azithromycin, nebs and steroids. Multiple attempts of self transferring during shift.

## 2017-06-03 ENCOUNTER — APPOINTMENT (OUTPATIENT)
Dept: OCCUPATIONAL THERAPY | Facility: CLINIC | Age: 82
DRG: 190 | End: 2017-06-03
Payer: MEDICARE

## 2017-06-03 ENCOUNTER — APPOINTMENT (OUTPATIENT)
Dept: PHYSICAL THERAPY | Facility: CLINIC | Age: 82
DRG: 190 | End: 2017-06-03
Payer: MEDICARE

## 2017-06-03 PROCEDURE — 94640 AIRWAY INHALATION TREATMENT: CPT

## 2017-06-03 PROCEDURE — 25000132 ZZH RX MED GY IP 250 OP 250 PS 637: Mod: GY | Performed by: PHYSICIAN ASSISTANT

## 2017-06-03 PROCEDURE — 97110 THERAPEUTIC EXERCISES: CPT | Mod: GP

## 2017-06-03 PROCEDURE — 94640 AIRWAY INHALATION TREATMENT: CPT | Mod: 76

## 2017-06-03 PROCEDURE — 25000125 ZZHC RX 250: Performed by: INTERNAL MEDICINE

## 2017-06-03 PROCEDURE — 25000125 ZZHC RX 250: Performed by: PHYSICIAN ASSISTANT

## 2017-06-03 PROCEDURE — 99233 SBSQ HOSP IP/OBS HIGH 50: CPT | Performed by: INTERNAL MEDICINE

## 2017-06-03 PROCEDURE — 40000133 ZZH STATISTIC OT WARD VISIT: Performed by: REHABILITATION PRACTITIONER

## 2017-06-03 PROCEDURE — 97535 SELF CARE MNGMENT TRAINING: CPT | Mod: GO | Performed by: REHABILITATION PRACTITIONER

## 2017-06-03 PROCEDURE — 40000274 ZZH STATISTIC RCP CONSULT EA 30 MIN

## 2017-06-03 PROCEDURE — 97530 THERAPEUTIC ACTIVITIES: CPT | Mod: GO | Performed by: REHABILITATION PRACTITIONER

## 2017-06-03 PROCEDURE — 97116 GAIT TRAINING THERAPY: CPT | Mod: GP

## 2017-06-03 PROCEDURE — 40000275 ZZH STATISTIC RCP TIME EA 10 MIN

## 2017-06-03 PROCEDURE — 40000193 ZZH STATISTIC PT WARD VISIT

## 2017-06-03 PROCEDURE — A9270 NON-COVERED ITEM OR SERVICE: HCPCS | Mod: GY | Performed by: PHYSICIAN ASSISTANT

## 2017-06-03 PROCEDURE — 12000000 ZZH R&B MED SURG/OB

## 2017-06-03 PROCEDURE — 25000128 H RX IP 250 OP 636: Performed by: PHYSICIAN ASSISTANT

## 2017-06-03 PROCEDURE — 97530 THERAPEUTIC ACTIVITIES: CPT | Mod: GP

## 2017-06-03 RX ADMIN — SERTRALINE HYDROCHLORIDE 75 MG: 25 TABLET ORAL at 07:44

## 2017-06-03 RX ADMIN — IPRATROPIUM BROMIDE AND ALBUTEROL SULFATE 3 ML: .5; 3 SOLUTION RESPIRATORY (INHALATION) at 15:58

## 2017-06-03 RX ADMIN — SIMVASTATIN 40 MG: 40 TABLET, FILM COATED ORAL at 19:39

## 2017-06-03 RX ADMIN — LEVOTHYROXINE SODIUM 50 MCG: 50 TABLET ORAL at 06:00

## 2017-06-03 RX ADMIN — AZITHROMYCIN 250 MG: 250 TABLET, FILM COATED ORAL at 21:31

## 2017-06-03 RX ADMIN — DONEPEZIL HYDROCHLORIDE 5 MG: 5 TABLET, FILM COATED ORAL at 19:39

## 2017-06-03 RX ADMIN — ASPIRIN 325 MG ORAL TABLET 325 MG: 325 PILL ORAL at 07:44

## 2017-06-03 RX ADMIN — QUINAPRIL HYDROCHLORIDE 20 MG: 10 TABLET, FILM COATED ORAL at 07:45

## 2017-06-03 RX ADMIN — IPRATROPIUM BROMIDE AND ALBUTEROL SULFATE 3 ML: .5; 3 SOLUTION RESPIRATORY (INHALATION) at 19:50

## 2017-06-03 RX ADMIN — CEFTRIAXONE 2 G: 2 INJECTION, POWDER, FOR SOLUTION INTRAMUSCULAR; INTRAVENOUS at 19:36

## 2017-06-03 RX ADMIN — HYDROCHLOROTHIAZIDE 12.5 MG: 12.5 CAPSULE ORAL at 07:45

## 2017-06-03 RX ADMIN — PREDNISONE 40 MG: 20 TABLET ORAL at 07:45

## 2017-06-03 RX ADMIN — IPRATROPIUM BROMIDE AND ALBUTEROL SULFATE 3 ML: .5; 3 SOLUTION RESPIRATORY (INHALATION) at 07:25

## 2017-06-03 RX ADMIN — IPRATROPIUM BROMIDE AND ALBUTEROL SULFATE 3 ML: .5; 3 SOLUTION RESPIRATORY (INHALATION) at 11:31

## 2017-06-03 NOTE — PLAN OF CARE
Problem: Goal Outcome Summary  Goal: Goal Outcome Summary  Discharge Planner OT   Patient plan for discharge: not stated, notes indicate patient may have family stay with her  Current status: Patient seated EOB with PCD pumps attached and activated. Unsure if patient had transferred self, however breakfast tray was in front of patient and she was leaning on tray table, not initiaing eating. Stood with CGA, fww, ambulated to bathroom and completed toilet transfer, including clothing management and attempted pericares, with CGA. Patient noted to not complete pericares thoroughly and A was provided. Cues needed to wash hands after toilet as patient did not self initiate, and cues also needed to use soap. Patient agreeable to transfer to chair after activity and was CGA, fww. Throughout session, patient did not attend to fww and at times opted not to use, as she either pushed it away or stated didn't needed it. Allowed patient to ambulate without for short distances (toilet to sink) to determine transfer status and short distances were CGA. Will defer to PT if fww is needed as OT suspects patient will not use at home. After seated in chair and top to breakfast plate was removed, patient did initiate eating with cues.   Barriers to return to prior living situation: lives alone, per OT note dated 6/2; SLUMS, scored 16/30 indicating severe cognitive deficits/dementia range; poor insight into current cognitive and functional deficits.   Recommendations for discharge: Recommend strict 24 hour supervision/A at this time. Pending family's ability to provide, pt may benefit from short TCU stay and higher level of care at d/c ([facility] with services). Highly recommend further cognitive assessment to further assess ultimate safe living situation-  if pt to d/c home with family- defer CPT to OP/home. if pt to d/c home alone, CPT. Recommend driving assessment be completed before patient resumes driving.  Rationale for recommendations:   Based on cognitive score, clinical judgement and observation from functional tasks/conversation with pt, recommending 24 hour supervision and assist for safety and completion of ADLS.       Entered by: Antonette Bhatt 06/03/2017 9:32 AM

## 2017-06-03 NOTE — PLAN OF CARE
Problem: Goal Outcome Summary  Goal: Goal Outcome Summary  Outcome: Improving  Ambulatory Status:  Pt up SBA.  VS:  WNL  Pain:  denies  Resp: LS coarse, wheezes in LLL  GI:  denies nausea.  Regular diet.  BS +.  Passing flatus.  Last BM 6/3.  Tx:  Rocephin, PO Zithromax and prednisone  Consults:  PT/OT  Disposition:  TBD

## 2017-06-03 NOTE — PLAN OF CARE
Problem: Goal Outcome Summary  Goal: Goal Outcome Summary  Outcome: No Change  VSS. Continues on 2 lpm nc, Pt found w/o 02, sats at 88%, nc reapplied. A&Ox2. Self transfer attempts x2. Up w/ SBA and walker to restroom.Refused breakfast, poor appetite for lunch. LS w/ crackles, frequent/ fair cough. Deep breathing and is encouraged.

## 2017-06-03 NOTE — PROGRESS NOTES
[facility]    Hospitalist Progress Note    Date of Service (when I saw the patient): 06/03/2017  Provider:  Jerome Rubin MD     Initial presenting complaint/issue to hospital (Diagnosis): shortness of breath and cough   Assessment & Plan   Mel Lazaro is a 87 year old female with a PMH significant for HTN, CKD, HLP, hypothyroidism, mild Alzheimer's disease and current tobacco use, who presents with cough and SOB. She had 3-4 days of increased cough and SOB. No fever, no chest pain.. Hypoxemic on room air at urgent care, % on 2L O2 here. Received 125 mg IV solu-medrol in the ER. She was put on oral prednisone     1. Community acquired pneumonia -   - Afebrile, good general status, not toxemic.Procalcitonin 7.24 on 5/31. Wbc down from 21.4 to 18.1.  - Will continue Rocephin and Azithromycin.  - Scheduled Duonebs and PO Prednisone, 40 mg daily.   - Wean O2 as able.      2. Current tobacco use, likely underlying undiagnosed COPD - smokes less than  1 pack per day, no formal dx of COPD but likely has this underlying given hyperinflation on CXR. Continue nebs, steroids.     3. CKD stage III - Cr 1.40 (1.53) saline lock. Encourage po intake. Avoid nephrotoxins    4. HTN -  home meds with parameters.    5. Hypothyroidism - resume home meds    6. HLP - home meds    7. Mild Alzheimer's disease - on aricept. Pt still lives independently. Cognitive assessment requested.     8. Hyponatremia - resolved.      DVT Prophylaxis: Pneumatic Compression Devices    Code Status: Full Code    Disposition: Expected discharge in 2 - 3 days once not in need for O2 sup.    Interval History   Patient claims that she is feeling better. Still on oxygen supplement. Shortness of breath getting better.     -Data reviewed today: I reviewed all new labs and imaging results over the last 24 hours. I personally reviewed the EKG tracing showing NSR.    Physical Exam   Temp: 96.1  F (35.6  C) Temp src: Axillary BP: 131/40  Pulse: 85 Heart Rate: 74 Resp: 16 SpO2: 97 % O2 Device: Nasal cannula Oxygen Delivery: 2 LPM  Vitals:    05/30/17 1928 05/31/17 0407 06/03/17 0555   Weight: 60.8 kg (134 lb) 57.8 kg (127 lb 6.4 oz) 60 kg (132 lb 3.2 oz)     Vital Signs with Ranges  Temp:  [96.1  F (35.6  C)-97.3  F (36.3  C)] 96.1  F (35.6  C)  Pulse:  [85] 85  Heart Rate:  [74] 74  Resp:  [16] 16  BP: (118-131)/(40-78) 131/40  SpO2:  [92 %-97 %] 97 %  I/O last 3 completed shifts:  In: 783 [P.O.:780; I.V.:3]  Out: -     GEN:  Alert, cooperative, forgetfull, NAD.  HEENT:  Normocephalic/atraumatic, no scleral icterus, no nasal discharge, mouth moist.  CV:  Regular rate and rhythm, no murmur or JVD.  S1 + S2 noted, no S3 or S4.  LUNGS: Soundscoarse to auscultation bilaterally with crackles LLB.  Symmetric chest rise on inhalation noted.  ABD:  Active bowel sounds, soft, non-tender/non-distended.  No rebound/guarding/rigidity.  EXT:  No edema or cyanosis.  No joint synovitis noted.  SKIN:  Dry to touch, no exanthems noted in the visualized areas.       Medications        ipratropium - albuterol 0.5 mg/2.5 mg/3 mL  3 mL Nebulization 4x Daily     aspirin  325 mg Oral Daily     donepezil  5 mg Oral QPM     hydrochlorothiazide  12.5 mg Oral Daily     levothyroxine (SYNTHROID/LEVOTHROID) tablet 50 mcg  50 mcg Oral QAM AC     quinapril  20 mg Oral Daily     sertraline  75 mg Oral Daily     simvastatin  40 mg Oral QPM     cefTRIAXone  2 g Intravenous Q24H     azithromycin  250 mg Oral Q24H     sodium chloride (PF)  3 mL Intracatheter Q8H     predniSONE  40 mg Oral Daily     nicotine   Transdermal Q8H     nicotine   Transdermal Daily       Data     Recent Labs  Lab 06/02/17  0618 06/01/17  0628 05/31/17  0628   WBC 18.1* 21.4* 10.4   HGB 10.5* 11.4* 11.4*   MCV 96 93 92    343 269    137 133   POTASSIUM 4.0 4.2 3.7   CHLORIDE 105 102 100   CO2 29 27 24   BUN 58* 57* 45*   CR 1.33* 1.40* 1.29*   ANIONGAP 5 8 9   JOE 8.2* 8.3* 8.2*   * 129*  134*       No results found for this or any previous visit (from the past 24 hour(s)).

## 2017-06-03 NOTE — PLAN OF CARE
Problem: Goal Outcome Summary  Goal: Goal Outcome Summary  Confused and disoriented to place and situation. HR regular. Lung sound coarse. Has congested cough. BS active. BM this shift. + flatus. VSS. Afebrile. Alarms on for safety.

## 2017-06-03 NOTE — PROGRESS NOTES
"Social Work Note:    D: SW met with pt after Charge RN indicated that pt took the NG canister off the wall and urinated into it last evening.     I/A: Spoke to pt who stated that her 3 daughters and grandchildren assist her at home. Pt stated that she resides alone \"but there is always someone there with me\". Pt stated that her granddaughter (who is a RN) sets up her medications for her. Pt stated that she still drives to get groceries and to go to [employer]. Pt stated that family will provide transportation to medical appointments.   Message left for pt's daughter (Tanya) to follow-up with her on the conversation that she had with RN care coordinator from 06/01/17. Per chart review, OT recommends TCU placement or close 24-supervision with driving assessment.    P: Awaiting a return phone call from Tanya to indicate family ability to provide cares after d/c.     Weekend SW: CHARLES Ariza, [facility]     "

## 2017-06-03 NOTE — PLAN OF CARE
Problem: Goal Outcome Summary  Goal: Goal Outcome Summary  PT: Patient seen by physical therapy for treatment  Discharge Planner PT   Patient plan for discharge: patient reports that she plans to return to home, however unreliable historian  Current status: Patient SBA for bed mobility; CGAfor gait/transfers with 2WW; impaired balance with 2 instances of LOB requiring min A to recover, impaired cognition; difficulty processing information noted.  Patient amb 200 feet with one lengthy rest break required.  Patient with poor safety awareness, often walking away from walker, pushing walker away and attempting to sit on bed/sofa before fully reaching bed/sofa.  On 2L O2 during mobility, O2 sats remained above 92% during activity.  Barriers to return to prior living situation: will need 24/7 assist for impaired cognition and mobility currently; stairs to basement laundry; doesn't appear safe to drive  Recommendations for discharge: Currently recommend TCU with transition to a more supportive living environment ([facility])  Rationale for recommendations: functional weakness, impaired balance, decreased activity tolerance; impaired cognition; decreased safety awareness; current need for assist       Entered by: Ethel Calvo 06/02/2017 7:35 PM

## 2017-06-03 NOTE — PLAN OF CARE
Problem: Goal Outcome Summary  Goal: Goal Outcome Summary  Discharge Planning PT:  Patient plans for discharge: Return home  Current Status: Pt requiring CGA for ambulation with WW, requires one HHA/Nadiya for ambulation without WW d/t unsteadiness, O2 dropping to 89% with in room ambulation and taking full minute in sitting before returning back to 90%.  Pt seems agreeable to use of WW at home, states granddaughter is executive at a company that could provide her with WW.  Barriers to return to prior living situation: Pt with unsteadiness during ambulation requiring assist, impaired cognition limiting safety of living independently.  Recommendation for discharge: TCU with consideration of [facility] due to assist/supervision needs.  Rationale for recommendations: generalized weakness and low activity tolerance, assistance required for mobility, impaired cognition

## 2017-06-04 ENCOUNTER — APPOINTMENT (OUTPATIENT)
Dept: PHYSICAL THERAPY | Facility: CLINIC | Age: 82
DRG: 190 | End: 2017-06-04
Payer: MEDICARE

## 2017-06-04 ENCOUNTER — APPOINTMENT (OUTPATIENT)
Dept: OCCUPATIONAL THERAPY | Facility: CLINIC | Age: 82
DRG: 190 | End: 2017-06-04
Payer: MEDICARE

## 2017-06-04 LAB
ACANTHOCYTES BLD QL SMEAR: SLIGHT
ANION GAP SERPL CALCULATED.3IONS-SCNC: 6 MMOL/L (ref 3–14)
BASOPHILS # BLD AUTO: 0 10E9/L (ref 0–0.2)
BASOPHILS NFR BLD AUTO: 0 %
BUN SERPL-MCNC: 35 MG/DL (ref 7–30)
CALCIUM SERPL-MCNC: 8.6 MG/DL (ref 8.5–10.1)
CHLORIDE SERPL-SCNC: 99 MMOL/L (ref 94–109)
CO2 SERPL-SCNC: 32 MMOL/L (ref 20–32)
CREAT SERPL-MCNC: 1 MG/DL (ref 0.52–1.04)
DIFFERENTIAL METHOD BLD: ABNORMAL
EOSINOPHIL # BLD AUTO: 0.2 10E9/L (ref 0–0.7)
EOSINOPHIL NFR BLD AUTO: 1 %
ERYTHROCYTE [DISTWIDTH] IN BLOOD BY AUTOMATED COUNT: 13.4 % (ref 10–15)
GFR SERPL CREATININE-BSD FRML MDRD: 53 ML/MIN/1.7M2
GLUCOSE SERPL-MCNC: 62 MG/DL (ref 70–99)
HCT VFR BLD AUTO: 36.6 % (ref 35–47)
HGB BLD-MCNC: 11.6 G/DL (ref 11.7–15.7)
LACTATE BLD-SCNC: 1.8 MMOL/L (ref 0.7–2.1)
LYMPHOCYTES # BLD AUTO: 3.5 10E9/L (ref 0.8–5.3)
LYMPHOCYTES NFR BLD AUTO: 16 %
MCH RBC QN AUTO: 29.6 PG (ref 26.5–33)
MCHC RBC AUTO-ENTMCNC: 31.7 G/DL (ref 31.5–36.5)
MCV RBC AUTO: 93 FL (ref 78–100)
METAMYELOCYTES # BLD: 0.2 10E9/L
METAMYELOCYTES NFR BLD MANUAL: 1 %
MONOCYTES # BLD AUTO: 1.1 10E9/L (ref 0–1.3)
MONOCYTES NFR BLD AUTO: 5 %
MYELOCYTES # BLD: 0.9 10E9/L
MYELOCYTES NFR BLD MANUAL: 4 %
NEUTROPHILS # BLD AUTO: 14.6 10E9/L (ref 1.6–8.3)
NEUTROPHILS NFR BLD AUTO: 67 %
PLATELET # BLD AUTO: 362 10E9/L (ref 150–450)
PLATELET # BLD EST: NORMAL 10*3/UL
POIKILOCYTOSIS BLD QL SMEAR: SLIGHT
POTASSIUM SERPL-SCNC: 3.7 MMOL/L (ref 3.4–5.3)
PROMYELOCYTES # BLD MANUAL: 1.3 10E9/L
PROMYELOCYTES NFR BLD MANUAL: 6 %
RBC # BLD AUTO: 3.92 10E12/L (ref 3.8–5.2)
SODIUM SERPL-SCNC: 137 MMOL/L (ref 133–144)
TOXIC GRANULES BLD QL SMEAR: PRESENT
WBC # BLD AUTO: 21.8 10E9/L (ref 4–11)

## 2017-06-04 PROCEDURE — 80048 BASIC METABOLIC PNL TOTAL CA: CPT | Performed by: INTERNAL MEDICINE

## 2017-06-04 PROCEDURE — 25000125 ZZHC RX 250: Performed by: PHYSICIAN ASSISTANT

## 2017-06-04 PROCEDURE — 25000128 H RX IP 250 OP 636: Performed by: PHYSICIAN ASSISTANT

## 2017-06-04 PROCEDURE — 94640 AIRWAY INHALATION TREATMENT: CPT

## 2017-06-04 PROCEDURE — 99233 SBSQ HOSP IP/OBS HIGH 50: CPT | Performed by: INTERNAL MEDICINE

## 2017-06-04 PROCEDURE — A9270 NON-COVERED ITEM OR SERVICE: HCPCS | Mod: GY | Performed by: PHYSICIAN ASSISTANT

## 2017-06-04 PROCEDURE — 12000000 ZZH R&B MED SURG/OB

## 2017-06-04 PROCEDURE — 36415 COLL VENOUS BLD VENIPUNCTURE: CPT | Performed by: INTERNAL MEDICINE

## 2017-06-04 PROCEDURE — 94640 AIRWAY INHALATION TREATMENT: CPT | Mod: 76

## 2017-06-04 PROCEDURE — 40000275 ZZH STATISTIC RCP TIME EA 10 MIN

## 2017-06-04 PROCEDURE — 97535 SELF CARE MNGMENT TRAINING: CPT | Mod: GO | Performed by: OCCUPATIONAL THERAPIST

## 2017-06-04 PROCEDURE — 25000132 ZZH RX MED GY IP 250 OP 250 PS 637: Mod: GY | Performed by: PHYSICIAN ASSISTANT

## 2017-06-04 PROCEDURE — 83605 ASSAY OF LACTIC ACID: CPT | Performed by: INTERNAL MEDICINE

## 2017-06-04 PROCEDURE — 97116 GAIT TRAINING THERAPY: CPT | Mod: GP

## 2017-06-04 PROCEDURE — 40000133 ZZH STATISTIC OT WARD VISIT: Performed by: OCCUPATIONAL THERAPIST

## 2017-06-04 PROCEDURE — 85025 COMPLETE CBC W/AUTO DIFF WBC: CPT | Performed by: INTERNAL MEDICINE

## 2017-06-04 PROCEDURE — 40000193 ZZH STATISTIC PT WARD VISIT

## 2017-06-04 PROCEDURE — 25000125 ZZHC RX 250: Performed by: INTERNAL MEDICINE

## 2017-06-04 RX ADMIN — CEFTRIAXONE 2 G: 2 INJECTION, POWDER, FOR SOLUTION INTRAMUSCULAR; INTRAVENOUS at 20:36

## 2017-06-04 RX ADMIN — QUINAPRIL HYDROCHLORIDE 20 MG: 10 TABLET, FILM COATED ORAL at 07:51

## 2017-06-04 RX ADMIN — SIMVASTATIN 40 MG: 40 TABLET, FILM COATED ORAL at 20:36

## 2017-06-04 RX ADMIN — DONEPEZIL HYDROCHLORIDE 5 MG: 5 TABLET, FILM COATED ORAL at 20:36

## 2017-06-04 RX ADMIN — PREDNISONE 40 MG: 20 TABLET ORAL at 07:53

## 2017-06-04 RX ADMIN — LEVOTHYROXINE SODIUM 50 MCG: 50 TABLET ORAL at 06:59

## 2017-06-04 RX ADMIN — IPRATROPIUM BROMIDE AND ALBUTEROL SULFATE 3 ML: .5; 3 SOLUTION RESPIRATORY (INHALATION) at 11:18

## 2017-06-04 RX ADMIN — SERTRALINE HYDROCHLORIDE 75 MG: 25 TABLET ORAL at 07:52

## 2017-06-04 RX ADMIN — IPRATROPIUM BROMIDE AND ALBUTEROL SULFATE 3 ML: .5; 3 SOLUTION RESPIRATORY (INHALATION) at 15:36

## 2017-06-04 RX ADMIN — IPRATROPIUM BROMIDE AND ALBUTEROL SULFATE 3 ML: .5; 3 SOLUTION RESPIRATORY (INHALATION) at 20:02

## 2017-06-04 RX ADMIN — IPRATROPIUM BROMIDE AND ALBUTEROL SULFATE 3 ML: .5; 3 SOLUTION RESPIRATORY (INHALATION) at 07:14

## 2017-06-04 RX ADMIN — ASPIRIN 325 MG ORAL TABLET 325 MG: 325 PILL ORAL at 07:52

## 2017-06-04 RX ADMIN — HYDROCHLOROTHIAZIDE 12.5 MG: 12.5 CAPSULE ORAL at 07:52

## 2017-06-04 NOTE — PLAN OF CARE
End of Shift Summary.  For vital signs and complete assessments, please see documentation flowsheets.     Pertinent assessments: pt alert to self only, pleasant. Pt with bed alarm activated for safety. Lungs coarse, harsh cough.  Major Shift Events: none  Plan (Upcoming Events): continue current cares  Discharge/Transfer Needs: will need 24 hour supervision on discharge    Bedside Shift Report Completed :   Bedside Safety Check Completed:

## 2017-06-04 NOTE — PLAN OF CARE
Problem: Goal Outcome Summary  Goal: Goal Outcome Summary  Discharge Planner OT   Patient plan for discharge: None documented, PATRICE reports pt to d/c to family's home with A for 1 week, then d/c home with son's A for 3 additional weeks  Current status: Pt continues to attempt to cover for imparied cognition but observed to need increased time to process directions, motor plan, and rest head on hands and state her uncertainity. Pt on 2L 02.  OT provided orientation and problem solving tasks.  Pt. Unable to fully answers orientation and problem solve typical ADL routines at home.  Pt. Denied need for assist at home or TCU needs.  OT attempted to setup ADL tasks and pt. Declined stating needing to return to sleep as up early.  Pt. Unsure of time of day but states normal up at 8.  OT stated time as 8:15 and pt. Stated she still needed to return to sleep.  Barriers to return to prior living situation: Pt lives alone and drives, does her own laundry which is in the basement, and cooks/makes meals such as sandwiches, [employer] meals.   Recommendations for discharge: Recommend 24 hour supervision/A at this time. Pending family's ability to provide, pt may benefit from short TCU stay and higher level of care at d/c ([facility] with services). Recommend cognitive assessment-  if pt to d/c home with family- defer CPT to OP/home. if pt to d/c home alone, CPT. Recommend driving assessment.   Rationale for recommendations: Pt presents with impaired ADLs, decreased awareness of needs, and functional mobility below baseline as well as with impaired cognition/safety/sequencing for daily tasks.

## 2017-06-04 NOTE — PLAN OF CARE
Problem: Goal Outcome Summary  Goal: Goal Outcome Summary  Outcome: No Change  Pt remains hospitalized for: PNA/COPD exac.    Ambulatory Status:  Pt up standby with walker. Alarms on for safety.  Orientation: Alert to self only. Forgetful  VS:  VSS-92% on room air   Pain:  denies  Resp: LS crackles. -sputum sample needed   GI:  denies nausea.  fair appetite and on regular diet.  +BS.  Passing flatus.  Last BM today.  :  Voiding without difficulty.  Skin:  Dry and flaky   Tx:  IV Rocephin and PO Zithromax   Labs:  WBC 18.1, Creatinine 1.33, Proclacitonin 3.49            Consults:  PT, OT, SW   Disposition:  tbd

## 2017-06-04 NOTE — PROGRESS NOTES
[facility]    Hospitalist Progress Note    Date of Service (when I saw the patient): 06/04/2017  Provider:  Jerome Rubin MD     Initial presenting complaint/issue to hospital (Diagnosis): shortness of breath and cough   Assessment & Plan   Mel Lazaro is a 87 year old female with a PMH significant for HTN, CKD, HLP, hypothyroidism, mild Alzheimer's disease and current tobacco use, who presents with cough and SOB. She had 3-4 days of increased cough and SOB. No fever, no chest pain.. Hypoxemic on room air at urgent care, % on 2L O2 here. Received 125 mg IV solu-medrol in the ER. She was put on oral prednisone     1. Community acquired pneumonia -   - Afebrile, good general status, not toxemic.Procalcitonin 7.24 on 5/31. Wbc down from 21.4 to 18.1.  - Will continue Rocephin and Azithromycin.  - Scheduled Duonebs and PO Prednisone, 40 mg daily.   - Wean O2 as able.      2. Current tobacco use, likely underlying undiagnosed COPD - smokes less than  1 pack per day, no formal dx of COPD but likely has this underlying given hyperinflation on CXR. Patient is still actively smoking and and doesn't want to quit. This may be challenging if she requires oxygen on discharge. Continue nebs, steroids.     3. CKD stage III - stable.  Encourage po intake. Avoid nephrotoxins    4. HTN -  home meds with parameters.    5. Hypothyroidism - Continue  home meds    6. HLP - home meds    7. Mild Alzheimer's disease - on aricept. Pt still lives independently. Cognitive assessment requested.     8. Hyponatremia - resolved.      DVT Prophylaxis: Pneumatic Compression Devices    Code Status: Full Code    Disposition: Expected discharge in 2-3 days once not in need for O2 sup.plan discussed with patient's daughter.     Interval History   Patient claims that she is slightly better today. Still has some intermittent cough. She has also some shortness of breath. Denies fever.     -Data reviewed today: I reviewed all new  labs and imaging results over the last 24 hours. I personally reviewed the EKG tracing showing NSR.    Physical Exam   Temp: 95.9  F (35.5  C) Temp src: Axillary BP: 145/71 Pulse: 87 Heart Rate: 72 Resp: 22 SpO2: 91 % O2 Device: Nasal cannula Oxygen Delivery: 2 LPM  Vitals:    05/31/17 0407 06/03/17 0555 06/04/17 0534   Weight: 57.8 kg (127 lb 6.4 oz) 60 kg (132 lb 3.2 oz) 59.7 kg (131 lb 9.6 oz)     Vital Signs with Ranges  Temp:  [95.9  F (35.5  C)-97.6  F (36.4  C)] 95.9  F (35.5  C)  Pulse:  [87] 87  Heart Rate:  [72-80] 72  Resp:  [18-22] 22  BP: (134-145)/(55-71) 145/71  SpO2:  [86 %-95 %] 91 %  I/O last 3 completed shifts:  In: 300 [P.O.:300]  Out: -     GEN:  Alert, cooperative, forgetfull, appears mildly dyspneic today.  HEENT:  Normocephalic/atraumatic, no scleral icterus, no nasal discharge, mouth moist.  CV:  Regular rate and rhythm, no murmur or JVD.  S1 + S2 noted, no S3 or S4.  LUNGS: Soundscoarse to auscultation bilaterally with crackles LLB.  Symmetric chest rise on inhalation noted.  ABD:  Active bowel sounds, soft, non-tender/non-distended.  No rebound/guarding/rigidity.  EXT:  No edema or cyanosis.  No joint synovitis noted.  SKIN:  Dry to touch, no exanthems noted in the visualized areas.       Medications        ipratropium - albuterol 0.5 mg/2.5 mg/3 mL  3 mL Nebulization 4x Daily     aspirin  325 mg Oral Daily     donepezil  5 mg Oral QPM     hydrochlorothiazide  12.5 mg Oral Daily     levothyroxine (SYNTHROID/LEVOTHROID) tablet 50 mcg  50 mcg Oral QAM AC     quinapril  20 mg Oral Daily     sertraline  75 mg Oral Daily     simvastatin  40 mg Oral QPM     cefTRIAXone  2 g Intravenous Q24H     sodium chloride (PF)  3 mL Intracatheter Q8H     predniSONE  40 mg Oral Daily     nicotine   Transdermal Q8H     nicotine   Transdermal Daily       Data     Recent Labs  Lab 06/04/17  0750 06/02/17  0618 06/01/17  0628   WBC 21.8* 18.1* 21.4*   HGB 11.6* 10.5* 11.4*   MCV 93 96 93    305 343   NA  137 139 137   POTASSIUM 3.7 4.0 4.2   CHLORIDE 99 105 102   CO2 32 29 27   BUN 35* 58* 57*   CR 1.00 1.33* 1.40*   ANIONGAP 6 5 8   JOE 8.6 8.2* 8.3*   GLC 62* 103* 129*       No results found for this or any previous visit (from the past 24 hour(s)).

## 2017-06-04 NOTE — PLAN OF CARE
Problem: Goal Outcome Summary  Goal: Goal Outcome Summary  Discharge Planning PT:  Patient plans for discharge: Return home  Current Status: Pt requiring CGA for ambulation with WW, requires one HHA/Nadiya for ambulation without WW d/t unsteadiness, O2 dropping to 89% with in room ambulation and taking full minute in sitting before returning back to 90%.  PT discussing benefit of WW with pt and daughter, daughter to check in with insurance on coverage, agreeable with consideration.    Barriers to return to prior living situation: Pt with unsteadiness during ambulation requiring assist, impaired cognition limiting safety of living independently.  Recommendation for discharge: TCU with consideration of RITESH due to assist/supervision needs vs home with 24hr supervision/caregiver assist  Rationale for recommendations: generalized weakness and low activity tolerance, assistance required for mobility, impaired cognition

## 2017-06-04 NOTE — PLAN OF CARE
Problem: Goal Outcome Summary  Goal: Goal Outcome Summary  Outcome: No Change  VSS. 02 reapplied, 2 lpm nc w/ 91-94% 02. Denied pain. Sepsis triggered, lactic 1.8. Up w/ Sba and walker for transfers. A&Ox2-3. LS diminished w/ fine crackles. Continues on Rocephin, azithromycin, nebs, and steroids. Family updated

## 2017-06-05 ENCOUNTER — APPOINTMENT (OUTPATIENT)
Dept: OCCUPATIONAL THERAPY | Facility: CLINIC | Age: 82
DRG: 190 | End: 2017-06-05
Payer: MEDICARE

## 2017-06-05 ENCOUNTER — APPOINTMENT (OUTPATIENT)
Dept: GENERAL RADIOLOGY | Facility: CLINIC | Age: 82
DRG: 190 | End: 2017-06-05
Attending: INTERNAL MEDICINE
Payer: MEDICARE

## 2017-06-05 ENCOUNTER — APPOINTMENT (OUTPATIENT)
Dept: PHYSICAL THERAPY | Facility: CLINIC | Age: 82
DRG: 190 | End: 2017-06-05
Payer: MEDICARE

## 2017-06-05 LAB
BASOPHILS # BLD AUTO: 0 10E9/L (ref 0–0.2)
BASOPHILS NFR BLD AUTO: 0 %
DIFFERENTIAL METHOD BLD: ABNORMAL
EOSINOPHIL # BLD AUTO: 0 10E9/L (ref 0–0.7)
EOSINOPHIL NFR BLD AUTO: 0 %
ERYTHROCYTE [DISTWIDTH] IN BLOOD BY AUTOMATED COUNT: 13.7 % (ref 10–15)
HCT VFR BLD AUTO: 36.3 % (ref 35–47)
HGB BLD-MCNC: 11.5 G/DL (ref 11.7–15.7)
LYMPHOCYTES # BLD AUTO: 1.7 10E9/L (ref 0.8–5.3)
LYMPHOCYTES NFR BLD AUTO: 8 %
MCH RBC QN AUTO: 29.3 PG (ref 26.5–33)
MCHC RBC AUTO-ENTMCNC: 31.7 G/DL (ref 31.5–36.5)
MCV RBC AUTO: 93 FL (ref 78–100)
METAMYELOCYTES # BLD: 2.9 10E9/L
METAMYELOCYTES NFR BLD MANUAL: 14 %
MONOCYTES # BLD AUTO: 1.2 10E9/L (ref 0–1.3)
MONOCYTES NFR BLD AUTO: 6 %
NEUTROPHILS # BLD AUTO: 15 10E9/L (ref 1.6–8.3)
NEUTROPHILS NFR BLD AUTO: 72 %
PLATELET # BLD AUTO: 349 10E9/L (ref 150–450)
PLATELET # BLD EST: NORMAL 10*3/UL
RBC # BLD AUTO: 3.92 10E12/L (ref 3.8–5.2)
RBC MORPH BLD: ABNORMAL
WBC # BLD AUTO: 20.8 10E9/L (ref 4–11)

## 2017-06-05 PROCEDURE — 40000193 ZZH STATISTIC PT WARD VISIT: Performed by: PHYSICAL THERAPIST

## 2017-06-05 PROCEDURE — 97116 GAIT TRAINING THERAPY: CPT | Mod: GP | Performed by: PHYSICAL THERAPIST

## 2017-06-05 PROCEDURE — 12000000 ZZH R&B MED SURG/OB

## 2017-06-05 PROCEDURE — 85025 COMPLETE CBC W/AUTO DIFF WBC: CPT | Performed by: INTERNAL MEDICINE

## 2017-06-05 PROCEDURE — 40000275 ZZH STATISTIC RCP TIME EA 10 MIN

## 2017-06-05 PROCEDURE — 36415 COLL VENOUS BLD VENIPUNCTURE: CPT | Performed by: INTERNAL MEDICINE

## 2017-06-05 PROCEDURE — 97535 SELF CARE MNGMENT TRAINING: CPT | Mod: GO | Performed by: STUDENT IN AN ORGANIZED HEALTH CARE EDUCATION/TRAINING PROGRAM

## 2017-06-05 PROCEDURE — 25000125 ZZHC RX 250: Performed by: PHYSICIAN ASSISTANT

## 2017-06-05 PROCEDURE — 25000128 H RX IP 250 OP 636: Performed by: PHYSICIAN ASSISTANT

## 2017-06-05 PROCEDURE — 97110 THERAPEUTIC EXERCISES: CPT | Mod: GP | Performed by: PHYSICAL THERAPIST

## 2017-06-05 PROCEDURE — 40000133 ZZH STATISTIC OT WARD VISIT: Performed by: STUDENT IN AN ORGANIZED HEALTH CARE EDUCATION/TRAINING PROGRAM

## 2017-06-05 PROCEDURE — 99233 SBSQ HOSP IP/OBS HIGH 50: CPT | Performed by: INTERNAL MEDICINE

## 2017-06-05 PROCEDURE — 25000125 ZZHC RX 250: Performed by: INTERNAL MEDICINE

## 2017-06-05 PROCEDURE — A9270 NON-COVERED ITEM OR SERVICE: HCPCS | Mod: GY | Performed by: PHYSICIAN ASSISTANT

## 2017-06-05 PROCEDURE — 94640 AIRWAY INHALATION TREATMENT: CPT

## 2017-06-05 PROCEDURE — 94640 AIRWAY INHALATION TREATMENT: CPT | Mod: 76

## 2017-06-05 PROCEDURE — 97530 THERAPEUTIC ACTIVITIES: CPT | Mod: GP | Performed by: PHYSICAL THERAPIST

## 2017-06-05 PROCEDURE — 71010 XR CHEST PORT 1 VW: CPT

## 2017-06-05 PROCEDURE — 25000132 ZZH RX MED GY IP 250 OP 250 PS 637: Mod: GY | Performed by: PHYSICIAN ASSISTANT

## 2017-06-05 RX ADMIN — IPRATROPIUM BROMIDE AND ALBUTEROL SULFATE 3 ML: .5; 3 SOLUTION RESPIRATORY (INHALATION) at 11:16

## 2017-06-05 RX ADMIN — DONEPEZIL HYDROCHLORIDE 5 MG: 5 TABLET, FILM COATED ORAL at 20:10

## 2017-06-05 RX ADMIN — QUINAPRIL HYDROCHLORIDE 20 MG: 10 TABLET, FILM COATED ORAL at 08:14

## 2017-06-05 RX ADMIN — IPRATROPIUM BROMIDE AND ALBUTEROL SULFATE 3 ML: .5; 3 SOLUTION RESPIRATORY (INHALATION) at 19:38

## 2017-06-05 RX ADMIN — IPRATROPIUM BROMIDE AND ALBUTEROL SULFATE 3 ML: .5; 3 SOLUTION RESPIRATORY (INHALATION) at 15:26

## 2017-06-05 RX ADMIN — SERTRALINE HYDROCHLORIDE 75 MG: 25 TABLET ORAL at 08:14

## 2017-06-05 RX ADMIN — SIMVASTATIN 40 MG: 40 TABLET, FILM COATED ORAL at 20:10

## 2017-06-05 RX ADMIN — ASPIRIN 325 MG ORAL TABLET 325 MG: 325 PILL ORAL at 08:14

## 2017-06-05 RX ADMIN — LEVOTHYROXINE SODIUM 50 MCG: 50 TABLET ORAL at 06:11

## 2017-06-05 RX ADMIN — CEFTRIAXONE 2 G: 2 INJECTION, POWDER, FOR SOLUTION INTRAMUSCULAR; INTRAVENOUS at 20:10

## 2017-06-05 RX ADMIN — HYDROCHLOROTHIAZIDE 12.5 MG: 12.5 CAPSULE ORAL at 08:14

## 2017-06-05 RX ADMIN — IPRATROPIUM BROMIDE AND ALBUTEROL SULFATE 3 ML: .5; 3 SOLUTION RESPIRATORY (INHALATION) at 07:35

## 2017-06-05 RX ADMIN — PREDNISONE 40 MG: 20 TABLET ORAL at 08:14

## 2017-06-05 NOTE — PLAN OF CARE
Problem: Goal Outcome Summary  Goal: Goal Outcome Summary  Discharge Planner PT   Patient plan for discharge: Not stated  Current status: Patient with improving but decreased activity tolerance; Needs 2L O2 to maintain O2 sats > 90% with activity; SBA for bed mobility; min/CGA for gait/transfers with a walker; unsteady; limited by functional weakness. Decreased safety awareness  Barriers to return to prior living situation: lives alone; impaired cognition; need for assist; decreased safety; impaired mobility; current need for 24/7 assist  Recommendations for discharge: TCU with eventual discharge to a more supportive living environment ([facility]?)  Rationale for recommendations: weakness, impaired cognition, decreased safety awareness; not safe to return home without 24/7 assist       Entered by: Vilma Goldman 06/05/2017 11:20 AM

## 2017-06-05 NOTE — DISCHARGE INSTRUCTIONS
[facility] RN/[phone]    [facility]  Dr Hamilton June 12 at 11:00 am

## 2017-06-05 NOTE — PLAN OF CARE
Problem: Goal Outcome Summary  Goal: Goal Outcome Summary  Outcome: Improving  VSS, pt denies pain. Pt reports dyspnea on exertion, lung sounds diminished with coarse crackles bilaterally. Pt reports infrequent, productive cough, weaned to 1 L NC. Pt disoriented to time & situation, forgetful. Pt up with assist of 1 & walker, alarm on, voiding fine. Eventual D/C home with supervision & assist vs. TCU.

## 2017-06-05 NOTE — PLAN OF CARE
Problem: Goal Outcome Summary  Goal: Goal Outcome Summary  VSS. Remains afebrile.   Lung sound coarse/crackles. Has fair congested cough.   Up in chair for dinner. Poor appetite.   Up with assist of 1 and walker.   2L o2.  Rocephin, nebs, zithro and prednisone.

## 2017-06-05 NOTE — PLAN OF CARE
Problem: Goal Outcome Summary  Goal: Goal Outcome Summary  Outcome: No Change  Pt continues to be hospitalized for pneumonia and COPD exacerbation. Continues on 1L O2. Home O2 test performed. LS coarse bilaterally. Continues on IV rocephin and  PO antibiotics. Disoriented to time only. Plan is to follow up with [facility], RN/OT/PT. Pt assist of 1 with walker, ambulating in ortega x3 this shift.

## 2017-06-05 NOTE — PROGRESS NOTES
Patient has been assessed for Home Oxygen needs. Oxygen readings:    *Pulse oximetry (SpO2) = 91% on room air at rest while awake.    *SpO2 improved to 96% on 2liters/minute at rest.    *SpO2 = 87% on room air during activity/with exercise.    *SpO2 improved to 92% on 2liters/minute during activity/with exercise.

## 2017-06-05 NOTE — PROGRESS NOTES
I met with Pt and dtg Tanya about dc planning.  Dtg Tanya will be able to stay with Pt to provide for 24/7 support.  Her other dtg Hemalatha will be able to take over Thursday.  They are agreeable to [facility] RN/PT/OT support.  Referral sent.  DC AVS updated.  They are hopeful she will be able to be weaned off of 02.  However, pt is surprised that she hasn't smoked for almost a week and she is doing well.  Dtg Tanya will also be able to provide transportation.  Luisa RN CTS [phone]    Pt's primary MD Dr Avila did leave the clinic.  Pt is scheduled with Dr Hamilton with [facility] on June 12th at 11am.  DC AVS updated.  Pt and dtg agreeable.

## 2017-06-05 NOTE — PROGRESS NOTES
[facility]  Referral received  from care transitions team.  Met with pt and daughter to discuss potential plans for HC.  Discharge date pending. Patient care support center processing referral. Pt has 24 hour phone number for [facility] for any questions or concerns.  [facility] will follow for final discharge plans.  Home care orders and face to face documentation needed at time of discharge. Anticipating need for RN PT OT. Pt will now be seen by Jourdan Hamilton at the [facility], she has an appointment 6/12/17 at 1100.

## 2017-06-05 NOTE — PLAN OF CARE
Problem: Goal Outcome Summary  Goal: Goal Outcome Summary  OT: Discharge Planner OT   Patient plan for discharge: Pt planning return home  Current status: transfers and mobility, FWW, CGA; O2 at 1 Lpm, pt fatigued with bathroom activity, O2 sats 90-91%; pt unable to state day of week, month, or time of day; pleasantly confused  Barriers to return to prior living situation: Pt lives alone and drives, does her own laundry which is in the basement, and cooks/makes meals, has to manage medications from pillbox  Recommendations for discharge: Recommend 24 hour supervision/A at this time. Pending family's ability to provide, pt may benefit from short TCU stay and higher level of care at d/c ([facility] with services). Recommend cognitive assessment-  if pt to d/c home with family- defer CPT to OP/home. if pt to d/c home alone, CPT. Recommend driving assessment.  Rationale for recommendations: Pt presents with impaired ADLs, decreased awareness of needs, and functional mobility below baseline as well as with impaired cognition/safety/sequencing for daily tasks.          Entered by: Griselda Cunningham 06/05/2017 10:33 AM

## 2017-06-06 ENCOUNTER — DOCUMENTATION ONLY (OUTPATIENT)
Dept: MEDSURG UNIT | Facility: CLINIC | Age: 82
End: 2017-06-06

## 2017-06-06 ENCOUNTER — APPOINTMENT (OUTPATIENT)
Dept: OCCUPATIONAL THERAPY | Facility: CLINIC | Age: 82
DRG: 190 | End: 2017-06-06
Payer: MEDICARE

## 2017-06-06 VITALS
HEART RATE: 70 BPM | DIASTOLIC BLOOD PRESSURE: 56 MMHG | BODY MASS INDEX: 21.46 KG/M2 | WEIGHT: 128.8 LBS | RESPIRATION RATE: 20 BRPM | TEMPERATURE: 97.1 F | SYSTOLIC BLOOD PRESSURE: 134 MMHG | HEIGHT: 65 IN | OXYGEN SATURATION: 93 %

## 2017-06-06 LAB
ANION GAP SERPL CALCULATED.3IONS-SCNC: 4 MMOL/L (ref 3–14)
BASOPHILS # BLD AUTO: 0 10E9/L (ref 0–0.2)
BASOPHILS NFR BLD AUTO: 0 %
BUN SERPL-MCNC: 27 MG/DL (ref 7–30)
CALCIUM SERPL-MCNC: 8.1 MG/DL (ref 8.5–10.1)
CHLORIDE SERPL-SCNC: 100 MMOL/L (ref 94–109)
CO2 SERPL-SCNC: 34 MMOL/L (ref 20–32)
CREAT SERPL-MCNC: 1 MG/DL (ref 0.52–1.04)
DIFFERENTIAL METHOD BLD: ABNORMAL
EOSINOPHIL # BLD AUTO: 0.2 10E9/L (ref 0–0.7)
EOSINOPHIL NFR BLD AUTO: 1 %
ERYTHROCYTE [DISTWIDTH] IN BLOOD BY AUTOMATED COUNT: 13.4 % (ref 10–15)
GFR SERPL CREATININE-BSD FRML MDRD: 52 ML/MIN/1.7M2
GLUCOSE SERPL-MCNC: 73 MG/DL (ref 70–99)
HCT VFR BLD AUTO: 33.4 % (ref 35–47)
HGB BLD-MCNC: 10.6 G/DL (ref 11.7–15.7)
LYMPHOCYTES # BLD AUTO: 1.1 10E9/L (ref 0.8–5.3)
LYMPHOCYTES NFR BLD AUTO: 6 %
MCH RBC QN AUTO: 29.4 PG (ref 26.5–33)
MCHC RBC AUTO-ENTMCNC: 31.7 G/DL (ref 31.5–36.5)
MCV RBC AUTO: 93 FL (ref 78–100)
METAMYELOCYTES # BLD: 1 10E9/L
METAMYELOCYTES NFR BLD MANUAL: 5 %
MONOCYTES # BLD AUTO: 0.8 10E9/L (ref 0–1.3)
MONOCYTES NFR BLD AUTO: 4 %
NEUTROPHILS # BLD AUTO: 16 10E9/L (ref 1.6–8.3)
NEUTROPHILS NFR BLD AUTO: 84 %
PLATELET # BLD AUTO: 321 10E9/L (ref 150–450)
PLATELET # BLD EST: NORMAL 10*3/UL
POTASSIUM SERPL-SCNC: 3.6 MMOL/L (ref 3.4–5.3)
RBC # BLD AUTO: 3.6 10E12/L (ref 3.8–5.2)
RBC MORPH BLD: ABNORMAL
SODIUM SERPL-SCNC: 138 MMOL/L (ref 133–144)
WBC # BLD AUTO: 19 10E9/L (ref 4–11)

## 2017-06-06 PROCEDURE — 40000133 ZZH STATISTIC OT WARD VISIT: Performed by: REHABILITATION PRACTITIONER

## 2017-06-06 PROCEDURE — A9270 NON-COVERED ITEM OR SERVICE: HCPCS | Mod: GY | Performed by: PHYSICIAN ASSISTANT

## 2017-06-06 PROCEDURE — 97535 SELF CARE MNGMENT TRAINING: CPT | Mod: GO | Performed by: REHABILITATION PRACTITIONER

## 2017-06-06 PROCEDURE — 25000125 ZZHC RX 250: Performed by: PHYSICIAN ASSISTANT

## 2017-06-06 PROCEDURE — 36415 COLL VENOUS BLD VENIPUNCTURE: CPT | Performed by: INTERNAL MEDICINE

## 2017-06-06 PROCEDURE — 25000132 ZZH RX MED GY IP 250 OP 250 PS 637: Mod: GY | Performed by: PHYSICIAN ASSISTANT

## 2017-06-06 PROCEDURE — 80048 BASIC METABOLIC PNL TOTAL CA: CPT | Performed by: INTERNAL MEDICINE

## 2017-06-06 PROCEDURE — 94640 AIRWAY INHALATION TREATMENT: CPT

## 2017-06-06 PROCEDURE — 85025 COMPLETE CBC W/AUTO DIFF WBC: CPT | Performed by: INTERNAL MEDICINE

## 2017-06-06 PROCEDURE — 25000125 ZZHC RX 250: Performed by: INTERNAL MEDICINE

## 2017-06-06 PROCEDURE — 40000275 ZZH STATISTIC RCP TIME EA 10 MIN

## 2017-06-06 PROCEDURE — 99239 HOSP IP/OBS DSCHRG MGMT >30: CPT | Performed by: INTERNAL MEDICINE

## 2017-06-06 PROCEDURE — 94640 AIRWAY INHALATION TREATMENT: CPT | Mod: 76

## 2017-06-06 PROCEDURE — 97530 THERAPEUTIC ACTIVITIES: CPT | Mod: GO | Performed by: REHABILITATION PRACTITIONER

## 2017-06-06 RX ORDER — NICOTINE 21 MG/24HR
1 PATCH, TRANSDERMAL 24 HOURS TRANSDERMAL DAILY PRN
Qty: 30 PATCH | Refills: 0 | Status: SHIPPED | OUTPATIENT
Start: 2017-06-06 | End: 2023-01-01

## 2017-06-06 RX ORDER — PREDNISONE 10 MG/1
TABLET ORAL
Qty: 18 TABLET | Refills: 0 | Status: SHIPPED | OUTPATIENT
Start: 2017-06-06 | End: 2023-01-01

## 2017-06-06 RX ADMIN — IPRATROPIUM BROMIDE AND ALBUTEROL SULFATE 3 ML: .5; 3 SOLUTION RESPIRATORY (INHALATION) at 08:20

## 2017-06-06 RX ADMIN — IPRATROPIUM BROMIDE AND ALBUTEROL SULFATE 3 ML: .5; 3 SOLUTION RESPIRATORY (INHALATION) at 15:21

## 2017-06-06 RX ADMIN — SERTRALINE HYDROCHLORIDE 75 MG: 25 TABLET ORAL at 09:09

## 2017-06-06 RX ADMIN — QUINAPRIL HYDROCHLORIDE 20 MG: 10 TABLET, FILM COATED ORAL at 09:09

## 2017-06-06 RX ADMIN — HYDROCHLOROTHIAZIDE 12.5 MG: 12.5 CAPSULE ORAL at 09:09

## 2017-06-06 RX ADMIN — PREDNISONE 40 MG: 20 TABLET ORAL at 09:09

## 2017-06-06 RX ADMIN — LEVOTHYROXINE SODIUM 50 MCG: 50 TABLET ORAL at 05:36

## 2017-06-06 RX ADMIN — ASPIRIN 325 MG ORAL TABLET 325 MG: 325 PILL ORAL at 09:09

## 2017-06-06 RX ADMIN — IPRATROPIUM BROMIDE AND ALBUTEROL SULFATE 3 ML: .5; 3 SOLUTION RESPIRATORY (INHALATION) at 11:31

## 2017-06-06 NOTE — PLAN OF CARE
"Problem: Goal Outcome Summary  Goal: Goal Outcome Summary  Outcome: No Change  /59 (BP Location: Left arm)  Pulse 90  Temp 98.2  F (36.8  C) (Oral)  Resp 20  Ht 1.651 m (5' 5\")  Wt 59.1 kg (130 lb 3.2 oz)  SpO2 94%  BMI 21.67 kg/m2  Neuro: A&O to self, place and situation, disoriented to time, some forgetfulness  Pain: denies pain  Resp: LS course, on scheduled nebs, oral prednisone, continues to need 1 L continuous O2  Cardiac: WDL  GI/: bowel sounds positive, passing gas, voiding appropriately, this shift   Diet: fair appetite, ate about 40% of dinner  Skin/mobility: intact, up with Ax1 and walker, refused walking in hallway, up to bathroom x1   Tx:  Continues on IV Rocephin, PT/OT following, plan to discharge home with [facility], possible need for home O2  Will continue to monitor and provide supportive care.             "

## 2017-06-06 NOTE — PROGRESS NOTES
Received intake call for home oxygen at 1:27 PM. Reviewed patient's chart; Patient qualifies under Medicare guidelines and all documentation is in the chart including a good order.   Called patient to offer choice, but there was no answer in patients room. I will try back again or offer at bedside.   Spoke with nurse Mesa confirmed we received the order.  I will go to bedside between 4-430 for teaching with nurse and patient for the Oxygen Equipment.  The nurse will then teach patients daughter before patient discharges.

## 2017-06-06 NOTE — PLAN OF CARE
Problem: Goal Outcome Summary  Goal: Goal Outcome Summary  Discharge Planner OT   Patient plan for discharge: not stated  Current status: When asked, patient able to state she was in a hospital, oriented to state and city she was in. Noted frequent cues with all tasks needed throughout session. Patient able to complete toileting cares, sinkside hand hygiene, oral cares, washing face and combing hair with SBA and simple step by step directions. Patient did not in initiate any cares today, however with direction, as able to start tasks and complete with adequate thoroughness. Patient also does not fully attend to fww, tends to move it aside when sitting on toilet and transferring from EOB to chair at bedside. Patient will need close supervision with all tasks. Patient is agreeable and pleasant, however is unable to follow through with multiple step directions. O2 sats remained at 90% throughout session  Barriers to return to prior living situation: Pt lives alone and drives, does her own laundry which is in the basement, and cooks/makes meals such as sandwiches, Francisca Calendar meals  Recommendations for discharge: SWS notes indicate family will be able to provide 24 hour supervision at discharge with home RN/PT/OT services. Will defer CPT testing to home OT to further assist with safe planning and further ADL/IADL's needs and supervision. At this time, recommend strict 24/7 supervision and close supervision for personal cares, as I suspect patient will not follow through independently.  Rationale for recommendations: Pt lives alone and drives, does her own laundry which is in the basement, and cooks/makes meals. Based on cognitive score, clinical judgement and observation from functional tasks/conversation with pt, recommending 24 hour supervision and assist for safety and completion of ADLS.       Entered by: Antonette Bhatt 06/06/2017 8:15 AM

## 2017-06-06 NOTE — PLAN OF CARE
Problem: Discharge Planning  Goal: Discharge Planning (Adult, OB, Behavioral, Peds)  Pt admitted and treated for PNA and COPD Exac. Pt treated with a coarse of abx and prednisone. Pt being d/c'd home on prednisone taper-filled script sent with patient and patient's daughters. Pt being sent home with new order for home oxygen, provided by  home oxygen. Pt given portable tank and education material for use of home oxygen. PIV-d/c'd without difficulty. Pt given all discharge instructions, filled prescription for prednisone, walker and portable oxygen tank. Pt family verbalized understanding. All questions answered,concerns addressed. Pt being transported home via personal car driven by daughters. Pt stable at discharge.

## 2017-06-06 NOTE — CONSULTS
"CLINICAL NUTRITION SERVICES  -  ASSESSMENT NOTE    Recommendations Ordered by Registered Dietitian (RD):   Ensure Plus @ 2 pm   Malnutrition:   Non-Severe malnutrition  In Context of:  Chronic illness or disease     REASON FOR ASSESSMENT  Mel Lazaro is a 87 year old female seen by Registered Dietitian for RN Consult - \"poor appetite, COPD new\" and LOS    NUTRITION HISTORY  - Information obtained from patient, EMR.   - Pt likes Francisca Calendar meals, sandwiches, yogurt, chocolate. She consumes meals BID and snacks when hungry.   - She reports to consume Ensure a few times weekly.     CURRENT NUTRITION ORDERS  Diet Order:     Regular     Current Intake/Tolerance:  RN documentation and meal review shows % intakes of small meals. She consumed 100% of a muffin for breakfast, states that her appetite has remained the same as baseline.     PHYSICAL FINDINGS  Observed  Muscle Wasting - suspect gradual; temporal, clavicle, dorsal hand regions.   Frail appearing  Obtained from Chart/Interdisciplinary Team  None noted    ANTHROPOMETRICS  Height: 5' 5\"  Weight: 128 lbs 12.8 oz (58.4 kg)   Body mass index is 21.43 kg/(m^2).  Weight Status:  Normal BMI  IBW: 56.8 kg  % IBW: 103%  Weight History: Mel denies any recent weight changes. \"care everywhere\" indicates a possible weight loss of ~2# in the past week (1.5%)  Wt Readings from Last 10 Encounters:   06/06/17 58.4 kg (128 lb 12.8 oz)   Per \"Care Everywhere\" -   5/27/17 - 59.4 kg (131 lb)  10/24/2016 - 60.8 kg (134 lb)      LABS  Labs reviewed    MEDICATIONS  Medications reviewed    Dosing Weight 58.41 kg    ASSESSED NUTRITION NEEDS PER APPROVED PRACTICE GUIDELINES:  Estimated Energy Needs: 5842-4761 kcals (25-30 Kcal/Kg)  Justification: maintenance  Estimated Protein Needs: 58-70 grams protein (1-1.2 g pro/Kg)  Justification: preservation of lean body mass w/ regard to CKD III  Estimated Fluid Needs: 6043-7688 mL (1 mL/Kcal)  Justification: " maintenance    MALNUTRITION:  % Weight Loss:  Up to 1-2% in 1 week (non-severe malnutrition)  % Intake:  Decreased intake does not meet criteria for malnutrition   Subcutaneous Fat Loss:  None observed  Muscle Loss:  Temporal region mild depletion, Clavicle bone region mild depletion and Dorsal hand region mild depletion - suspect baseline  Fluid Retention:  None noted    Malnutrition Diagnosis: Non-Severe malnutrition  In Context of:  Chronic illness or disease    NUTRITION DIAGNOSIS:  Predicted inadequate nutrient intake related to poor appetite 2/2 COPD as evidenced by % intakes of very small meals, 1.5% weight loss in one week.    NUTRITION INTERVENTIONS  Recommendations / Nutrition Prescription  Regular diet as ordered  Ensure plus @ 2 pm      Implementation  Nutrition education: No education needs assessed at this time  Medical Food Supplement: Ordered in EPIC as above  Collaboration and Referral of Nutrition care: Pt discussed during interdisciplinary rounds      Nutrition Goals  Pt to consume 75% of meals TID + 1 supplement daily.       MONITORING AND EVALUATION:  Liquid meal replacement or supplement - intake/tolerance  Progress towards goals will be monitored and evaluated per protocol and Practice Guidelines       Pam Albright RD, LD

## 2017-06-06 NOTE — PLAN OF CARE
Problem: Goal Outcome Summary  Goal: Goal Outcome Summary  PT- attempted to see, declined as wanting to rest. Did issue RW for home if discharges later today as per notes.  Goals are partially met and do recommend home PT services.

## 2017-06-06 NOTE — DISCHARGE SUMMARY
[facility]  Discharge Summary  Name: Mel Lazaro    MRN: [id number]  YOB: 1929    Age: 87 year old  Date of Discharge:  6/6/2017  Date of Admission: 5/30/2017  Primary Care Provider: Lokesh Guadarrama  Discharge Physician:  Gavin Dugan MD  Discharging Service:  Hospitalist      Discharge Diagnoses:  Acute hypoxemic respiratory failure  CAP  Suspect COPD exacerbation  Tobacco dependence  HTN  CKD stage III  HLD  Alzheimer's dementia  Acute hyponatremia     Hospital Course:  Mel Lazaro is a 87 year old female with a PMH significant for HTN, CKD, HLP, hypothyroidism, mild Alzheimer's disease and current tobacco use, who presented to emergency room with cough and SOB. She had 3-4 days of increased cough and SOB. She had no fever. She also had no chest pain. She was hypoxemic on room air at urgent care, % on 2L O2 here. She received 125 mg IV solu-medrol in the ER. She was put on oral prednisone, nebs, ceftriaxone and azithromycin.  Admitted for further management. Slow to improve on medical therapy.  Completed 7 days CTX and 5 days azithro.  Steroids narrowed to prednisone.  Unable to fully wean O2 and requiring 2 L which was prescribed on d/c.  Seen by PT and OT who feel patient requires 24/7 supervision.  Patient and family declined TCU placement.  They will provide 24/7 care.  Home care set up.      Acute hypoxemic respiratory failure:  due to CAP and likely COPD exacerbation.  Weaned O2 down to 2 L continuous, but still had desaturation to 87% on 1 L at rest day of discharge.  suspect may become long term.  Started with 3 months new O2 Rx for 2 L continuous.  Will need follow up for this.     Community acquired pneumonia: Procalcitonin 7.24 on 5/31. WBC elevated partly due to steroids.  Completed 7 day course of ceftriaxone and 5 days of azithromycin.    Suspect COPD exacerbation:  Ongoing tobacco use.  No previous diagnosis of COPD, but hyperinflated lungs on  chest xray and clinical suspicion she had acute exacerbation here.  9 day prednisone taper on d/c ordered.  F/u with pcp for this.    Alzheimer's disease: on aricept and zolofr. Pt still lives independently. PT and OT consulted who recommended 24 hours supervision.  Patient's family will be providing this supervision and have declined TCU or LTC placement.  Home care RN/PT/OT referrals placed.    Tobacco dependence:  States she does not want to quit. Did Rx nicotine patch on discharge.     CKD stage III - stable.  Encourage po intake. Avoid nephrotoxins     HTN:  Home quinapril and HCTZ resumed     Hypothyroidism:  Resumed synthroid     HLD:  Resumed zocor     Acute mild hyponatremia:  resolved.      Discharge Disposition:  Discharged to home     Allergies:  No Known Allergies     Discharge Medications:   Current Discharge Medication List      START taking these medications    Details   order for DME Equipment being ordered: Walker Wheels () and Walker ()  Treatment Diagnosis: gait impairment  Qty: 1 each, Refills: 0    Associated Diagnoses: Pneumonia of left lower lobe due to infectious organism      predniSONE (DELTASONE) 10 MG tablet Take 30mg for 3 days, 20mg for 3 days, 10mg for 3 days  Qty: 18 tablet, Refills: 0    Associated Diagnoses: COPD exacerbation (H)      nicotine (NICODERM CQ) 21 MG/24HR 24 hr patch Place 1 patch onto the skin daily as needed for smoking cessation  Qty: 30 patch, Refills: 0    Associated Diagnoses: Tobacco dependence syndrome         CONTINUE these medications which have NOT CHANGED    Details   hydrochlorothiazide (MICROZIDE) 12.5 MG capsule Take 12.5 mg by mouth daily      LEVOTHYROXINE SODIUM PO Take 50 mcg by mouth daily       donepezil (ARICEPT) 5 MG tablet Take 5 mg by mouth every evening      quinapril (ACCUPRIL) 40 MG Tab Take 20 mg by mouth daily      sertraline (ZOLOFT) 50 MG tablet Take 75 mg by mouth daily . 1.5 tabs daily      simvastatin (ZOCOR) 40 MG tablet  "Take 40 mg by mouth every evening      aspirin 325 MG tablet Take 325 mg by mouth daily      calcium carbonate (TUMS) 500 MG chewable tablet Take 1 chew tab by mouth daily              Condition on Discharge:  Discharge condition: Stable   Discharge vitals: Blood pressure 134/56, pulse 70, temperature 97.1  F (36.2  C), temperature source Oral, resp. rate 20, height 1.651 m (5' 5\"), weight 58.4 kg (128 lb 12.8 oz), SpO2 92 %.   Code status on discharge: Full Code     History of Illness:  See detailed admission note for full details.    Physical Exam:  Blood pressure 134/56, pulse 70, temperature 97.1  F (36.2  C), temperature source Oral, resp. rate 20, height 1.651 m (5' 5\"), weight 58.4 kg (128 lb 12.8 oz), SpO2 92 %.  Wt Readings from Last 1 Encounters:   06/06/17 58.4 kg (128 lb 12.8 oz)     Constitutional: Awake, NAD, chronically ill appearing  Eyes: sclera white   HEENT: atraumatic, MMM  Respiratory: no respiratory distress, few crackles anterior LLL, no wheeze  Cardiovascular: RRR.  No murmur   GI: non-tender, not distended, bowel sounds present  Skin: no rash or lesions, acyanotic  Musculoskeletal/extremities: atraumatic, no major deformities. No edema  Neurologic: Alert, not oriented to place or time  Psychiatric: calm, cooperative     Procedures other than Imaging:  none     Imaging:  Results for orders placed or performed during the hospital encounter of 05/30/17   XR Chest 2 Views    Narrative    CHEST TWO VIEWS   5/30/2017 8:45 PM    HISTORY: Shortness of breath.    COMPARISON: None.      Impression    IMPRESSION: There is a moderate infiltrate in the anterior aspect of  the left lung base. The right lung is clear. No other abnormality is  seen.      PEDRO LUIS MATTA MD   XR Chest Port 1 View    Narrative    XR CHEST PORT 1 VW 6/5/2017 12:05 PM    HISTORY: Short of breath. Bilateral basilar crackles.    COMPARISON: 5/30/2017    FINDINGS: Basilar interstitial edema. No lobar consolidation, " júnior  pleural effusion or pneumothorax. Stable heart size.      Impression    IMPRESSION: Basilar interstitial edema.    IAIN HERRERA MD        Consultations:  No consultations were requested during this admission.       Recent Lab Results:    Recent Labs  Lab 06/06/17  0716 06/05/17  0910 06/04/17  0750   WBC 19.0* 20.8* 21.8*   HGB 10.6* 11.5* 11.6*   HCT 33.4* 36.3 36.6   MCV 93 93 93    349 362       Recent Labs  Lab 06/06/17  0716 06/04/17  0750 06/02/17  0618    137 139   POTASSIUM 3.6 3.7 4.0   CHLORIDE 100 99 105   CO2 34* 32 29   ANIONGAP 4 6 5   GLC 73 62* 103*   BUN 27 35* 58*   CR 1.00 1.00 1.33*   GFRESTIMATED 52* 53* 38*   GFRESTBLACK 63 64 46*   JOE 8.1* 8.6 8.2*     No results for input(s): CULT in the last 168 hours.       Pending Results:    Unresulted Labs Ordered in the Past 30 Days of this Admission     No orders found from 3/31/2017 to 5/31/2017.         These results will be followed up by patient's primary care provider.    Discharge Instructions and Follow-Up:     Discharge Procedure Orders  Home Care PT Referral for Hospital Discharge   Referral Type: Home Health Therapies & Aides     Home Care OT Referral for Hospital Discharge     Home care nursing referral   Referral Type: Home Health Therapies & Aides     Reason for your hospital stay   Order Comments: You were hospitalized for pneumonia and you likely have COPD from smoking.  You have finished a course of antibiotics.  You will complete a steroid taper on discharge.  Its recommended you have ongoing 24/7 supervision and home services as you have declined rehab placement.     Follow-up and recommended labs and tests    Order Comments: Follow up with primary care provider, Lokesh Guadarrama, within 7 days for hospital follow- up.  No follow up labs or test are needed.     Activity   Order Comments: Your activity upon discharge: activity as tolerated   Order Specific Question Answer Comments   Is discharge order? Yes       MD face to face encounter   Order Comments: Documentation of Face to Face and Certification for Home Health Services    I certify that patient: Mel Lazaro is under my care and that I, or a nurse practitioner or physician's assistant working with me, had a face-to-face encounter that meets the physician face-to-face encounter requirements with this patient on: 6/6/2017.    This encounter with the patient was in whole, or in part, for the following medical condition, which is the primary reason for home health care: COPD and dementia.    I certify that, based on my findings, the following services are medically necessary home health services: Nursing, Occupational Therapy and Physical Therapy.    My clinical findings support the need for the above services because: Nurse is needed: For complex aftercare of surgical procedures because the patient needs instruction and cannot perform care on their own due to: dementia., To provide assessment and oversight required in the home to assure adherence to the medical plan due to: dementia. and To provide caregiver training to assist with: home oxygen and medication management.., Occupational Therapy Services are needed to assess and treat cognitive ability and address ADL safety due to impairment in cognition and weakness. and Physical Therapy Services are needed to assess and treat the following functional impairments: weakness and fall risk.    Further, I certify that my clinical findings support that this patient is homebound (i.e. absences from home require considerable and taxing effort and are for medical reasons or Mu-ism services or infrequently or of short duration when for other reasons) because: Requires assistance of another person or specialized equipment to access medical services because patient: Is prone to wander/get lost without assistance...    Based on the above findings. I certify that this patient is confined to the home and needs  intermittent skilled nursing care, physical therapy and/or speech therapy.  The patient is under my care, and I have initiated the establishment of the plan of care.  This patient will be followed by a physician who will periodically review the plan of care.  Physician/Provider to provide follow up care: Lokesh Guadarrama    [facility] physician (the Medicare certified TURNER provider): Gavin Dugan  Physician Signature: See electronic signature associated with these discharge orders.  Date: 6/6/2017     Full Code     Oxygen Adult   Order Comments: New Home Oxygen Order 2 liter(s) by nasal cannula continuously with use of portable tank. Expected treatment length is 3 months.. Test on conserving device as applicable.    Patients who qualify for home O2 coverage under the CMS guidelines require ABG tests or O2 sat readings obtained closest to, but no earlier than 2 days prior to the discharge, as evidence of the need for home oxygen therapy. Testing must be performed while patient is in the chronic stable state. See notes for O2 sats.    I certify that this patient, Mel Lazaro has been under my care and that I, or a nurse practitioner or physician's assistant working with me, had a face-to-face encounter that meets the face-to-face encounter requirements with this patient on 6/6/2017. The patient, Mle Lazaro was evaluated or treated in whole, or in part, for the following medical condition, which necessitates the use of the ordered oxygen. Treatment Diagnosis: COPD    Attending Provider: Gavin Dugan  Physician signature: See electronic signature associated with these discharge orders  Date of Order: June 6, 2017     Diet   Order Comments: Follow this diet upon discharge: Orders Placed This Encounter     Room Service     Snacks/Supplements Adult: Ensure Plus (Adult); Between Meals     Combination Diet Regular Diet Adult   Order Specific Question Answer Comments   Is discharge order? Yes           Recommendations:  -prednisone taper as above  -home oxygen 2 L continuous prescribed  -f/u with pcp within 7 days  -home PT, OT, RN services requested    IGavin, personally saw the patient today and spent greater than 30 minutes discharging this patient.    Gavin Dugan MD

## 2017-06-06 NOTE — PLAN OF CARE
Problem: Goal Outcome Summary  Goal: Goal Outcome Summary  Outcome: No Change  Patient resting comfortably. Denies pain/discomfort. Vital signs stable on 1 LPM, oxygen in low 90's. Alert/orientated but forgetful at times. Continue Rocephin and Zithromax for PNA. Plan to D/C home today with C and family.

## 2017-06-06 NOTE — PROGRESS NOTES
I called Dtg Tanya to notify her of dc planning.  She is able to  pt after 1700 today since she is at work.  Bedside RN will need to notify her when home 02 arranged so she knows when to pick her up.  Referral sent to [facility] RN/PT/OT.  Dtg sounds overwhelmed today over phone and requests now to meet with a NIELS and Danelle [facility] again.  However, she can't pinpoint what other resources or discussions need to occur before discharge.  OLU COTTER has been following pt and has been updating dtg through out entire hospital stay.  Reassured dtg that Danelle will call her from [facility] and that if other needs arise [facility] can consult . She is aware I can meet with her till 3:30 today and SW can meet her until 4:30 today.  Luisa RAINES CTS

## 2017-06-06 NOTE — PLAN OF CARE
Problem: Goal Outcome Summary  Goal: Goal Outcome Summary  Outcome: No Change  Pt continues to be hospitalized for pneumonia. Disoriented to time only. Continues with IV rocephin, zithromax and PO prednisone. On 1-2L O2 throughout the day. Denies SOB. [facility] will be here around 4pm to provide home oxygen supply and education to patient and two daughters. Will discharge to home this evening with RN/PT/OT home care. Up with assist of one and walker.

## 2017-06-07 NOTE — PROGRESS NOTES
Oxygen order received and set up per physician prescription.     Spoke with patient and offered choice, patient would like [facility] to set her up with oxygen.     Paperwork received, order and equipment (POC)  Reviewed at bedside with patient, and family members Hemalatha and Tanya.  Follow up call scheduled for 6/8/2017. Out of pocket expenses and potential copay     discussed with patient/family.

## 2017-06-07 NOTE — PLAN OF CARE
Problem: Goal Outcome Summary  Goal: Goal Outcome Summary  Occupational Therapy Discharge Summary     Reason for therapy discharge:    Discharged to home with home therapy.     Progress towards therapy goal(s). See goals on Care Plan in [facility] electronic health record for goal details.  Goals met     Therapy recommendation(s):    Continued therapy is recommended.  Rationale/Recommendations:  to complete CPT to assist with further planning for safe and appropriate home environment and supervision.

## 2017-06-07 NOTE — PROGRESS NOTES
Transition Communication Hand-off for Care Transitions to Next Level of Care Provider    Name: Mel Lazaro  MRN #: [id number]  Primary Care Provider: Lokesh Saeed [facility]  Primary Care MD Name: Margarita  Primary Clinic: [address] [place] [address]  Primary Care Clinic Name: [facility]  Reason for Hospitalization:  Renal insufficiency [N28.9]  Reactive airway disease, unspecified asthma severity, with acute exacerbation [J45.901]  Pneumonia of left lower lobe due to infectious organism [J18.9]  Admit Date/Time: 5/30/2017  7:13 PM  Discharge Date: 6/6/17  Payor Source: Payor: MEDICARE / Plan: MEDICARE / Product Type: Medicare /     Readmission Assessment Measure (TRAVIS) Risk Score/category: AVERAGE    Plan of Care Goals/Milestone Events:   Patient Concerns: New home 02.  Smoker-given smoking cessation            Reason for Communication Hand-off Referral: Admission diagnoses: PN  Admission diagnoses: COPD  Fragility  Difficulty understanding plan of care  Other rec 24/7 supervisions which dtgs can provide; also smoker with new home 02    Discharge Plan:  Discharge Plan:       Most Recent Value    Concerns To Be Addressed cognitive/perceptual concerns           Concern for non-adherence with plan of care:   YES  Discharge Needs Assessment:  Needs       Most Recent Value    Anticipated Changes Related to Illness inability to care for self [pt declined TCU]    Equipment Currently Used at Home none    Transportation Available car    # of Referrals Placed by [facility], Scheduled Follow-up appointments          Already enrolled in Tele-monitoring program and name of program:  NA  Follow-up specialty is recommended: No    Follow-up plan:  No future appointments.    Any outstanding tests or procedures:    Procedures     Future Labs/Procedures    Oxygen Adult     Comments:    New Home Oxygen Order 2 liter(s) by nasal cannula continuously with use of portable tank. Expected treatment length is 3  months.. Test on conserving device as applicable.    Patients who qualify for home O2 coverage under the CMS guidelines require ABG tests or O2 sat readings obtained closest to, but no earlier than 2 days prior to the discharge, as evidence of the need for home oxygen therapy. Testing must be performed while patient is in the chronic stable state. See notes for O2 sats.    I certify that this patient, Mel Lazaro has been under my care and that I, or a nurse practitioner or physician's assistant working with me, had a face-to-face encounter that meets the face-to-face encounter requirements with this patient on 6/6/2017. The patient, Mel Lazaro was evaluated or treated in whole, or in part, for the following medical condition, which necessitates the use of the ordered oxygen. Treatment Diagnosis: COPD    Attending Provider: Gavin Dugan  Physician signature: See electronic signature associated with these discharge orders  Date of Order: June 6, 2017          Referrals     Future Labs/Procedures    Home care nursing referral     Comments:    RN skilled nursing visit. RN to assess vital signs and weight, respiratory and cardiac status, hydration, nutrition and bowel status and home safety.  RN to teach medication management and home oxygen.    Your provider has ordered home care nursing services. If you have not been contacted within 2 days of your discharge please call the inpatient department phone number at [phone] .    Home Care OT Referral for Hospital Discharge     Comments:    OT to eval and treat    Your provider has ordered home care - occupational therapy. If you have not been contacted within 2 days of your discharge please call the department phone number listed on the top of this document.    Home Care PT Referral for Hospital Discharge     Comments:    PT to eval and treat    Your provider has ordered home care - physical therapy. If you have not been contacted within 2 days of your  discharge please call the department phone number listed on the top of this document.          Reason for your hospital stay       You were hospitalized for pneumonia and you likely have COPD from smoking.  You have finished a course of antibiotics.  You will complete a steroid taper on discharge.  Its recommended you have ongoing 24/7 supervision and home services as you have declined rehab placement.                   Key Recommendations:  PNA diagnosis.  New COPD? Smoker- 1 PPD; [place] 02 through [facility]. Memory issues-OT rec 24/7 supervision, which daughters said they can provide.  [facility] RN/PT/OT with cognitive eval.  Before hospitalization she still drove a car.  New primary MD with Lokesh since Dr Avila left-she had him for 25 years.  F/u appt scheduled with Dr Hamilton to establish care and manage home care orders.  Dtg Tanya sounded overwhelmed on day of dc.  Home care can provide SW consult if needed.      Yu Lilly [phone]    AVS/Discharge Summary is the source of truth; this is a helpful guide for improved communication of patient story

## 2023-01-01 ENCOUNTER — ANESTHESIA EVENT (OUTPATIENT)
Dept: SURGERY | Facility: CLINIC | Age: 88
DRG: 956 | End: 2023-01-01
Payer: MEDICARE

## 2023-01-01 ENCOUNTER — APPOINTMENT (OUTPATIENT)
Dept: PHYSICAL THERAPY | Facility: CLINIC | Age: 88
DRG: 956 | End: 2023-01-01
Payer: MEDICARE

## 2023-01-01 ENCOUNTER — APPOINTMENT (OUTPATIENT)
Dept: CT IMAGING | Facility: CLINIC | Age: 88
DRG: 956 | End: 2023-01-01
Attending: EMERGENCY MEDICINE
Payer: MEDICARE

## 2023-01-01 ENCOUNTER — ANESTHESIA (OUTPATIENT)
Dept: SURGERY | Facility: CLINIC | Age: 88
DRG: 956 | End: 2023-01-01
Payer: MEDICARE

## 2023-01-01 ENCOUNTER — PATIENT OUTREACH (OUTPATIENT)
Dept: CARE COORDINATION | Facility: CLINIC | Age: 88
End: 2023-01-01
Payer: MEDICARE

## 2023-01-01 ENCOUNTER — DOCUMENTATION ONLY (OUTPATIENT)
Dept: OTHER | Facility: CLINIC | Age: 88
End: 2023-01-01
Payer: MEDICARE

## 2023-01-01 ENCOUNTER — APPOINTMENT (OUTPATIENT)
Dept: PHYSICAL THERAPY | Facility: CLINIC | Age: 88
DRG: 956 | End: 2023-01-01
Attending: ORTHOPAEDIC SURGERY
Payer: MEDICARE

## 2023-01-01 ENCOUNTER — APPOINTMENT (OUTPATIENT)
Dept: GENERAL RADIOLOGY | Facility: CLINIC | Age: 88
DRG: 956 | End: 2023-01-01
Attending: EMERGENCY MEDICINE
Payer: MEDICARE

## 2023-01-01 ENCOUNTER — HOSPITAL ENCOUNTER (INPATIENT)
Facility: CLINIC | Age: 88
LOS: 8 days | Discharge: HOSPICE | DRG: 956 | End: 2023-12-18
Attending: EMERGENCY MEDICINE | Admitting: HOSPITALIST
Payer: MEDICARE

## 2023-01-01 ENCOUNTER — APPOINTMENT (OUTPATIENT)
Dept: GENERAL RADIOLOGY | Facility: CLINIC | Age: 88
DRG: 956 | End: 2023-01-01
Attending: ORTHOPAEDIC SURGERY
Payer: MEDICARE

## 2023-01-01 VITALS
TEMPERATURE: 97.1 F | HEART RATE: 80 BPM | OXYGEN SATURATION: 91 % | DIASTOLIC BLOOD PRESSURE: 57 MMHG | SYSTOLIC BLOOD PRESSURE: 135 MMHG | RESPIRATION RATE: 20 BRPM

## 2023-01-01 DIAGNOSIS — S32.82XA MULTIPLE CLOSED FRACTURES OF PELVIS WITHOUT DISRUPTION OF PELVIC RING, INITIAL ENCOUNTER (H): ICD-10-CM
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
## 2023-01-01 DIAGNOSIS — S72.001A CLOSED FRACTURE OF NECK OF RIGHT FEMUR, INITIAL ENCOUNTER (H): Primary | ICD-10-CM

## 2023-01-01 DIAGNOSIS — Z51.5 HOSPICE CARE PATIENT: ICD-10-CM

## 2023-01-01 LAB
ABO/RH(D): NORMAL
ABO/RH(D): NORMAL
ALBUMIN UR-MCNC: 10 MG/DL
ALBUMIN UR-MCNC: 30 MG/DL
ANION GAP SERPL CALCULATED.3IONS-SCNC: 11 MMOL/L (ref 7–15)
ANION GAP SERPL CALCULATED.3IONS-SCNC: 6 MMOL/L (ref 7–15)
ANION GAP SERPL CALCULATED.3IONS-SCNC: 7 MMOL/L (ref 7–15)
ANION GAP SERPL CALCULATED.3IONS-SCNC: 8 MMOL/L (ref 7–15)
ANION GAP SERPL CALCULATED.3IONS-SCNC: 9 MMOL/L (ref 7–15)
ANTIBODY SCREEN: NEGATIVE
ANTIBODY SCREEN: NEGATIVE
APPEARANCE UR: ABNORMAL
APPEARANCE UR: CLEAR
ATRIAL RATE - MUSE: 68 BPM
BACTERIA #/AREA URNS HPF: ABNORMAL /HPF
BASOPHILS # BLD AUTO: 0 10E3/UL (ref 0–0.2)
BASOPHILS # BLD AUTO: 0 10E3/UL (ref 0–0.2)
BASOPHILS # BLD AUTO: 0.1 10E3/UL (ref 0–0.2)
BASOPHILS NFR BLD AUTO: 0 %
BASOPHILS NFR BLD AUTO: 0 %
BASOPHILS NFR BLD AUTO: 1 %
BILIRUB UR QL STRIP: NEGATIVE
BILIRUB UR QL STRIP: NEGATIVE
BLD PROD TYP BPU: NORMAL
BLOOD COMPONENT TYPE: NORMAL
BUN SERPL-MCNC: 25.8 MG/DL (ref 8–23)
BUN SERPL-MCNC: 28.9 MG/DL (ref 8–23)
BUN SERPL-MCNC: 42.7 MG/DL (ref 8–23)
BUN SERPL-MCNC: 48.8 MG/DL (ref 8–23)
BUN SERPL-MCNC: 52.9 MG/DL (ref 8–23)
CALCIUM SERPL-MCNC: 7.8 MG/DL (ref 8.2–9.6)
CALCIUM SERPL-MCNC: 7.9 MG/DL (ref 8.2–9.6)
CALCIUM SERPL-MCNC: 8 MG/DL (ref 8.2–9.6)
CALCIUM SERPL-MCNC: 8.5 MG/DL (ref 8.2–9.6)
CALCIUM SERPL-MCNC: 8.5 MG/DL (ref 8.2–9.6)
CHLORIDE SERPL-SCNC: 101 MMOL/L (ref 98–107)
CHLORIDE SERPL-SCNC: 101 MMOL/L (ref 98–107)
CHLORIDE SERPL-SCNC: 102 MMOL/L (ref 98–107)
CHLORIDE SERPL-SCNC: 103 MMOL/L (ref 98–107)
CHLORIDE SERPL-SCNC: 99 MMOL/L (ref 98–107)
CK SERPL-CCNC: 53 U/L (ref 26–192)
CODING SYSTEM: NORMAL
COLOR UR AUTO: ABNORMAL
COLOR UR AUTO: YELLOW
CREAT SERPL-MCNC: 1.14 MG/DL (ref 0.51–0.95)
CREAT SERPL-MCNC: 1.27 MG/DL (ref 0.51–0.95)
CREAT SERPL-MCNC: 1.43 MG/DL (ref 0.51–0.95)
CREAT SERPL-MCNC: 1.52 MG/DL (ref 0.51–0.95)
CREAT SERPL-MCNC: 1.53 MG/DL (ref 0.51–0.95)
CROSSMATCH: NORMAL
DEPRECATED HCO3 PLAS-SCNC: 25 MMOL/L (ref 22–29)
DEPRECATED HCO3 PLAS-SCNC: 26 MMOL/L (ref 22–29)
DEPRECATED HCO3 PLAS-SCNC: 27 MMOL/L (ref 22–29)
DIASTOLIC BLOOD PRESSURE - MUSE: NORMAL MMHG
EGFRCR SERPLBLD CKD-EPI 2021: 31 ML/MIN/1.73M2
EGFRCR SERPLBLD CKD-EPI 2021: 31 ML/MIN/1.73M2
EGFRCR SERPLBLD CKD-EPI 2021: 34 ML/MIN/1.73M2
EGFRCR SERPLBLD CKD-EPI 2021: 39 ML/MIN/1.73M2
EGFRCR SERPLBLD CKD-EPI 2021: 44 ML/MIN/1.73M2
EOSINOPHIL # BLD AUTO: 0 10E3/UL (ref 0–0.7)
EOSINOPHIL # BLD AUTO: 0.2 10E3/UL (ref 0–0.7)
EOSINOPHIL # BLD AUTO: 0.4 10E3/UL (ref 0–0.7)
EOSINOPHIL NFR BLD AUTO: 0 %
EOSINOPHIL NFR BLD AUTO: 1 %
EOSINOPHIL NFR BLD AUTO: 3 %
ERYTHROCYTE [DISTWIDTH] IN BLOOD BY AUTOMATED COUNT: 13.4 % (ref 10–15)
ERYTHROCYTE [DISTWIDTH] IN BLOOD BY AUTOMATED COUNT: 13.6 % (ref 10–15)
ERYTHROCYTE [DISTWIDTH] IN BLOOD BY AUTOMATED COUNT: 13.8 % (ref 10–15)
ERYTHROCYTE [DISTWIDTH] IN BLOOD BY AUTOMATED COUNT: 14.2 % (ref 10–15)
GLUCOSE BLDC GLUCOMTR-MCNC: 103 MG/DL (ref 70–99)
GLUCOSE BLDC GLUCOMTR-MCNC: 89 MG/DL (ref 70–99)
GLUCOSE SERPL-MCNC: 100 MG/DL (ref 70–99)
GLUCOSE SERPL-MCNC: 108 MG/DL (ref 70–99)
GLUCOSE SERPL-MCNC: 110 MG/DL (ref 70–99)
GLUCOSE SERPL-MCNC: 119 MG/DL (ref 70–99)
GLUCOSE SERPL-MCNC: 87 MG/DL (ref 70–99)
GLUCOSE SERPL-MCNC: 88 MG/DL (ref 70–99)
GLUCOSE UR STRIP-MCNC: NEGATIVE MG/DL
GLUCOSE UR STRIP-MCNC: NEGATIVE MG/DL
HCT VFR BLD AUTO: 20.7 % (ref 35–47)
HCT VFR BLD AUTO: 28.6 % (ref 35–47)
HCT VFR BLD AUTO: 31.9 % (ref 35–47)
HCT VFR BLD AUTO: 36.1 % (ref 35–47)
HGB BLD-MCNC: 10 G/DL (ref 11.7–15.7)
HGB BLD-MCNC: 11.6 G/DL (ref 11.7–15.7)
HGB BLD-MCNC: 6.4 G/DL (ref 11.7–15.7)
HGB BLD-MCNC: 6.5 G/DL (ref 11.7–15.7)
HGB BLD-MCNC: 8.9 G/DL (ref 11.7–15.7)
HGB BLD-MCNC: 9.9 G/DL (ref 11.7–15.7)
HGB UR QL STRIP: NEGATIVE
HGB UR QL STRIP: NEGATIVE
IMM GRANULOCYTES # BLD: 0 10E3/UL
IMM GRANULOCYTES # BLD: 0.1 10E3/UL
IMM GRANULOCYTES # BLD: 0.6 10E3/UL
IMM GRANULOCYTES NFR BLD: 0 %
IMM GRANULOCYTES NFR BLD: 1 %
IMM GRANULOCYTES NFR BLD: 4 %
INTERPRETATION ECG - MUSE: NORMAL
ISSUE DATE AND TIME: NORMAL
KETONES UR STRIP-MCNC: NEGATIVE MG/DL
KETONES UR STRIP-MCNC: NEGATIVE MG/DL
LEUKOCYTE ESTERASE UR QL STRIP: NEGATIVE
LEUKOCYTE ESTERASE UR QL STRIP: NEGATIVE
LYMPHOCYTES # BLD AUTO: 0.8 10E3/UL (ref 0.8–5.3)
LYMPHOCYTES # BLD AUTO: 1.2 10E3/UL (ref 0.8–5.3)
LYMPHOCYTES # BLD AUTO: 1.5 10E3/UL (ref 0.8–5.3)
LYMPHOCYTES NFR BLD AUTO: 10 %
LYMPHOCYTES NFR BLD AUTO: 11 %
LYMPHOCYTES NFR BLD AUTO: 8 %
MCH RBC QN AUTO: 29.9 PG (ref 26.5–33)
MCH RBC QN AUTO: 29.9 PG (ref 26.5–33)
MCH RBC QN AUTO: 30.1 PG (ref 26.5–33)
MCH RBC QN AUTO: 30.2 PG (ref 26.5–33)
MCHC RBC AUTO-ENTMCNC: 31.1 G/DL (ref 31.5–36.5)
MCHC RBC AUTO-ENTMCNC: 31.3 G/DL (ref 31.5–36.5)
MCHC RBC AUTO-ENTMCNC: 31.4 G/DL (ref 31.5–36.5)
MCHC RBC AUTO-ENTMCNC: 32.1 G/DL (ref 31.5–36.5)
MCV RBC AUTO: 94 FL (ref 78–100)
MCV RBC AUTO: 96 FL (ref 78–100)
MONOCYTES # BLD AUTO: 0.7 10E3/UL (ref 0–1.3)
MONOCYTES # BLD AUTO: 0.8 10E3/UL (ref 0–1.3)
MONOCYTES # BLD AUTO: 0.8 10E3/UL (ref 0–1.3)
MONOCYTES NFR BLD AUTO: 5 %
MONOCYTES NFR BLD AUTO: 7 %
MONOCYTES NFR BLD AUTO: 7 %
MUCOUS THREADS #/AREA URNS LPF: PRESENT /LPF
NEUTROPHILS # BLD AUTO: 11.8 10E3/UL (ref 1.6–8.3)
NEUTROPHILS # BLD AUTO: 8.5 10E3/UL (ref 1.6–8.3)
NEUTROPHILS # BLD AUTO: 8.7 10E3/UL (ref 1.6–8.3)
NEUTROPHILS NFR BLD AUTO: 77 %
NEUTROPHILS NFR BLD AUTO: 80 %
NEUTROPHILS NFR BLD AUTO: 85 %
NITRATE UR QL: NEGATIVE
NITRATE UR QL: NEGATIVE
NRBC # BLD AUTO: 0 10E3/UL
NRBC BLD AUTO-RTO: 0 /100
P AXIS - MUSE: 36 DEGREES
PH UR STRIP: 6 [PH] (ref 5–7)
PH UR STRIP: 6 [PH] (ref 5–7)
PLATELET # BLD AUTO: 242 10E3/UL (ref 150–450)
PLATELET # BLD AUTO: 257 10E3/UL (ref 150–450)
PLATELET # BLD AUTO: 280 10E3/UL (ref 150–450)
PLATELET # BLD AUTO: 298 10E3/UL (ref 150–450)
POTASSIUM SERPL-SCNC: 4 MMOL/L (ref 3.4–5.3)
POTASSIUM SERPL-SCNC: 4.1 MMOL/L (ref 3.4–5.3)
POTASSIUM SERPL-SCNC: 4.4 MMOL/L (ref 3.4–5.3)
POTASSIUM SERPL-SCNC: 4.4 MMOL/L (ref 3.4–5.3)
POTASSIUM SERPL-SCNC: 4.8 MMOL/L (ref 3.4–5.3)
PR INTERVAL - MUSE: 192 MS
PROCALCITONIN SERPL IA-MCNC: 0.7 NG/ML
PROCALCITONIN SERPL IA-MCNC: 1.75 NG/ML
QRS DURATION - MUSE: 68 MS
QT - MUSE: 408 MS
QTC - MUSE: 433 MS
R AXIS - MUSE: 86 DEGREES
RBC # BLD AUTO: 2.15 10E6/UL (ref 3.8–5.2)
RBC # BLD AUTO: 2.98 10E6/UL (ref 3.8–5.2)
RBC # BLD AUTO: 3.34 10E6/UL (ref 3.8–5.2)
RBC # BLD AUTO: 3.86 10E6/UL (ref 3.8–5.2)
RBC URINE: 0 /HPF
RBC URINE: <1 /HPF
SODIUM SERPL-SCNC: 133 MMOL/L (ref 135–145)
SODIUM SERPL-SCNC: 134 MMOL/L (ref 135–145)
SODIUM SERPL-SCNC: 135 MMOL/L (ref 135–145)
SODIUM SERPL-SCNC: 137 MMOL/L (ref 135–145)
SODIUM SERPL-SCNC: 138 MMOL/L (ref 135–145)
SP GR UR STRIP: 1.02 (ref 1–1.03)
SP GR UR STRIP: 1.02 (ref 1–1.03)
SPECIMEN EXPIRATION DATE: NORMAL
SPECIMEN EXPIRATION DATE: NORMAL
SQUAMOUS EPITHELIAL: <1 /HPF
SYSTOLIC BLOOD PRESSURE - MUSE: NORMAL MMHG
T AXIS - MUSE: 90 DEGREES
UNIT ABO/RH: NORMAL
UNIT NUMBER: NORMAL
UNIT STATUS: NORMAL
UNIT TYPE ISBT: 5100
UROBILINOGEN UR STRIP-MCNC: NORMAL MG/DL
UROBILINOGEN UR STRIP-MCNC: NORMAL MG/DL
VENTRICULAR RATE- MUSE: 68 BPM
WBC # BLD AUTO: 10.4 10E3/UL (ref 4–11)
WBC # BLD AUTO: 11 10E3/UL (ref 4–11)
WBC # BLD AUTO: 12.2 10E3/UL (ref 4–11)
WBC # BLD AUTO: 14.8 10E3/UL (ref 4–11)
WBC URINE: 3 /HPF
WBC URINE: <1 /HPF

## 2023-01-01 PROCEDURE — 250N000011 HC RX IP 250 OP 636: Mod: JZ | Performed by: PHYSICIAN ASSISTANT

## 2023-01-01 PROCEDURE — 85025 COMPLETE CBC W/AUTO DIFF WBC: CPT | Performed by: HOSPITALIST

## 2023-01-01 PROCEDURE — 250N000013 HC RX MED GY IP 250 OP 250 PS 637: Performed by: PHYSICIAN ASSISTANT

## 2023-01-01 PROCEDURE — 36415 COLL VENOUS BLD VENIPUNCTURE: CPT | Performed by: EMERGENCY MEDICINE

## 2023-01-01 PROCEDURE — 85027 COMPLETE CBC AUTOMATED: CPT | Performed by: PHYSICIAN ASSISTANT

## 2023-01-01 PROCEDURE — 80048 BASIC METABOLIC PNL TOTAL CA: CPT | Performed by: HOSPITALIST

## 2023-01-01 PROCEDURE — 250N000025 HC SEVOFLURANE, PER MIN: Performed by: ORTHOPAEDIC SURGERY

## 2023-01-01 PROCEDURE — 258N000003 HC RX IP 258 OP 636: Performed by: ORTHOPAEDIC SURGERY

## 2023-01-01 PROCEDURE — G1010 CDSM STANSON: HCPCS

## 2023-01-01 PROCEDURE — 73502 X-RAY EXAM HIP UNI 2-3 VIEWS: CPT

## 2023-01-01 PROCEDURE — P9016 RBC LEUKOCYTES REDUCED: HCPCS | Performed by: INTERNAL MEDICINE

## 2023-01-01 PROCEDURE — 84145 PROCALCITONIN (PCT): CPT | Performed by: HOSPITALIST

## 2023-01-01 PROCEDURE — 258N000003 HC RX IP 258 OP 636: Performed by: PHYSICIAN ASSISTANT

## 2023-01-01 PROCEDURE — C1713 ANCHOR/SCREW BN/BN,TIS/BN: HCPCS | Performed by: ORTHOPAEDIC SURGERY

## 2023-01-01 PROCEDURE — 258N000003 HC RX IP 258 OP 636: Performed by: ANESTHESIOLOGY

## 2023-01-01 PROCEDURE — 710N000009 HC RECOVERY PHASE 1, LEVEL 1, PER MIN: Performed by: ORTHOPAEDIC SURGERY

## 2023-01-01 PROCEDURE — 250N000013 HC RX MED GY IP 250 OP 250 PS 637: Performed by: ORTHOPAEDIC SURGERY

## 2023-01-01 PROCEDURE — 360N000083 HC SURGERY LEVEL 3 W/ FLUORO, PER MIN: Performed by: ORTHOPAEDIC SURGERY

## 2023-01-01 PROCEDURE — 250N000013 HC RX MED GY IP 250 OP 250 PS 637: Performed by: HOSPITALIST

## 2023-01-01 PROCEDURE — 250N000011 HC RX IP 250 OP 636: Performed by: NURSE ANESTHETIST, CERTIFIED REGISTERED

## 2023-01-01 PROCEDURE — 85018 HEMOGLOBIN: CPT | Performed by: INTERNAL MEDICINE

## 2023-01-01 PROCEDURE — 86900 BLOOD TYPING SEROLOGIC ABO: CPT | Performed by: PHYSICIAN ASSISTANT

## 2023-01-01 PROCEDURE — 36415 COLL VENOUS BLD VENIPUNCTURE: CPT | Performed by: PHYSICIAN ASSISTANT

## 2023-01-01 PROCEDURE — 36415 COLL VENOUS BLD VENIPUNCTURE: CPT | Performed by: HOSPITALIST

## 2023-01-01 PROCEDURE — 258N000003 HC RX IP 258 OP 636: Performed by: HOSPITALIST

## 2023-01-01 PROCEDURE — 250N000011 HC RX IP 250 OP 636: Performed by: ORTHOPAEDIC SURGERY

## 2023-01-01 PROCEDURE — 120N000001 HC R&B MED SURG/OB

## 2023-01-01 PROCEDURE — 97162 PT EVAL MOD COMPLEX 30 MIN: CPT | Mod: GP

## 2023-01-01 PROCEDURE — 36415 COLL VENOUS BLD VENIPUNCTURE: CPT | Performed by: INTERNAL MEDICINE

## 2023-01-01 PROCEDURE — 999N000179 XR SURGERY CARM FLUORO LESS THAN 5 MIN W STILLS: Mod: TC

## 2023-01-01 PROCEDURE — 82947 ASSAY GLUCOSE BLOOD QUANT: CPT | Performed by: HOSPITALIST

## 2023-01-01 PROCEDURE — 99223 1ST HOSP IP/OBS HIGH 75: CPT | Mod: AI | Performed by: PHYSICIAN ASSISTANT

## 2023-01-01 PROCEDURE — 999N000111 HC STATISTIC OT IP EVAL DEFER: Performed by: REHABILITATION PRACTITIONER

## 2023-01-01 PROCEDURE — 72131 CT LUMBAR SPINE W/O DYE: CPT | Mod: ME

## 2023-01-01 PROCEDURE — 370N000017 HC ANESTHESIA TECHNICAL FEE, PER MIN: Performed by: ORTHOPAEDIC SURGERY

## 2023-01-01 PROCEDURE — G0378 HOSPITAL OBSERVATION PER HR: HCPCS

## 2023-01-01 PROCEDURE — 36415 COLL VENOUS BLD VENIPUNCTURE: CPT | Performed by: ORTHOPAEDIC SURGERY

## 2023-01-01 PROCEDURE — 93005 ELECTROCARDIOGRAM TRACING: CPT

## 2023-01-01 PROCEDURE — 250N000009 HC RX 250: Performed by: ORTHOPAEDIC SURGERY

## 2023-01-01 PROCEDURE — 250N000013 HC RX MED GY IP 250 OP 250 PS 637: Performed by: INTERNAL MEDICINE

## 2023-01-01 PROCEDURE — 97530 THERAPEUTIC ACTIVITIES: CPT | Mod: GP

## 2023-01-01 PROCEDURE — 99232 SBSQ HOSP IP/OBS MODERATE 35: CPT | Performed by: CLINICAL NURSE SPECIALIST

## 2023-01-01 PROCEDURE — 85025 COMPLETE CBC W/AUTO DIFF WBC: CPT | Performed by: EMERGENCY MEDICINE

## 2023-01-01 PROCEDURE — 86923 COMPATIBILITY TEST ELECTRIC: CPT | Performed by: INTERNAL MEDICINE

## 2023-01-01 PROCEDURE — 99231 SBSQ HOSP IP/OBS SF/LOW 25: CPT | Performed by: HOSPITALIST

## 2023-01-01 PROCEDURE — 0QS604Z REPOSITION RIGHT UPPER FEMUR WITH INTERNAL FIXATION DEVICE, OPEN APPROACH: ICD-10-PCS | Performed by: ORTHOPAEDIC SURGERY

## 2023-01-01 PROCEDURE — 86900 BLOOD TYPING SEROLOGIC ABO: CPT | Performed by: INTERNAL MEDICINE

## 2023-01-01 PROCEDURE — 80048 BASIC METABOLIC PNL TOTAL CA: CPT | Performed by: EMERGENCY MEDICINE

## 2023-01-01 PROCEDURE — 82550 ASSAY OF CK (CPK): CPT | Performed by: PHYSICIAN ASSISTANT

## 2023-01-01 PROCEDURE — 99231 SBSQ HOSP IP/OBS SF/LOW 25: CPT | Performed by: INTERNAL MEDICINE

## 2023-01-01 PROCEDURE — 999N000141 HC STATISTIC PRE-PROCEDURE NURSING ASSESSMENT: Performed by: ORTHOPAEDIC SURGERY

## 2023-01-01 PROCEDURE — 99231 SBSQ HOSP IP/OBS SF/LOW 25: CPT | Performed by: CLINICAL NURSE SPECIALIST

## 2023-01-01 PROCEDURE — 250N000009 HC RX 250: Performed by: NURSE ANESTHETIST, CERTIFIED REGISTERED

## 2023-01-01 PROCEDURE — 99285 EMERGENCY DEPT VISIT HI MDM: CPT | Mod: 25

## 2023-01-01 PROCEDURE — 258N000003 HC RX IP 258 OP 636: Performed by: NURSE ANESTHETIST, CERTIFIED REGISTERED

## 2023-01-01 PROCEDURE — 99233 SBSQ HOSP IP/OBS HIGH 50: CPT | Performed by: INTERNAL MEDICINE

## 2023-01-01 PROCEDURE — 97530 THERAPEUTIC ACTIVITIES: CPT | Mod: GP | Performed by: PHYSICAL THERAPIST

## 2023-01-01 PROCEDURE — 99232 SBSQ HOSP IP/OBS MODERATE 35: CPT | Performed by: HOSPITALIST

## 2023-01-01 PROCEDURE — 81001 URINALYSIS AUTO W/SCOPE: CPT | Performed by: HOSPITALIST

## 2023-01-01 PROCEDURE — 250N000009 HC RX 250: Performed by: ANESTHESIOLOGY

## 2023-01-01 PROCEDURE — 99223 1ST HOSP IP/OBS HIGH 75: CPT | Performed by: CLINICAL NURSE SPECIALIST

## 2023-01-01 PROCEDURE — 81001 URINALYSIS AUTO W/SCOPE: CPT | Performed by: PHYSICIAN ASSISTANT

## 2023-01-01 PROCEDURE — 250N000013 HC RX MED GY IP 250 OP 250 PS 637: Performed by: CLINICAL NURSE SPECIALIST

## 2023-01-01 PROCEDURE — 272N000001 HC OR GENERAL SUPPLY STERILE: Performed by: ORTHOPAEDIC SURGERY

## 2023-01-01 PROCEDURE — C1769 GUIDE WIRE: HCPCS | Performed by: ORTHOPAEDIC SURGERY

## 2023-01-01 PROCEDURE — 99239 HOSP IP/OBS DSCHRG MGMT >30: CPT | Performed by: INTERNAL MEDICINE

## 2023-01-01 PROCEDURE — 85018 HEMOGLOBIN: CPT | Performed by: ORTHOPAEDIC SURGERY

## 2023-01-01 PROCEDURE — 80048 BASIC METABOLIC PNL TOTAL CA: CPT | Performed by: PHYSICIAN ASSISTANT

## 2023-01-01 PROCEDURE — 999N000127 HC STATISTIC PERIPHERAL IV START W US GUIDANCE

## 2023-01-01 DEVICE — IMPLANTABLE DEVICE: Type: IMPLANTABLE DEVICE | Site: HIP | Status: FUNCTIONAL

## 2023-01-01 DEVICE — PLATE BN TI 1 HL STRL FEM NK: Type: IMPLANTABLE DEVICE | Site: HIP | Status: FUNCTIONAL

## 2023-01-01 RX ORDER — ONDANSETRON 4 MG/1
4 TABLET, ORALLY DISINTEGRATING ORAL EVERY 6 HOURS PRN
Qty: 10 TABLET | Refills: 0 | Status: SHIPPED | OUTPATIENT
Start: 2023-01-01

## 2023-01-01 RX ORDER — HYDROMORPHONE HCL IN WATER/PF 6 MG/30 ML
0.2 PATIENT CONTROLLED ANALGESIA SYRINGE INTRAVENOUS
Status: DISCONTINUED | OUTPATIENT
Start: 2023-01-01 | End: 2023-01-01

## 2023-01-01 RX ORDER — ACETAMINOPHEN 325 MG/1
975 TABLET ORAL ONCE
Status: DISCONTINUED | OUTPATIENT
Start: 2023-01-01 | End: 2023-01-01 | Stop reason: HOSPADM

## 2023-01-01 RX ORDER — HYDROMORPHONE HCL IN WATER/PF 6 MG/30 ML
0.4 PATIENT CONTROLLED ANALGESIA SYRINGE INTRAVENOUS
Status: DISCONTINUED | OUTPATIENT
Start: 2023-01-01 | End: 2023-01-01

## 2023-01-01 RX ORDER — ACETAMINOPHEN 325 MG/1
975 TABLET ORAL EVERY 8 HOURS
Status: COMPLETED | OUTPATIENT
Start: 2023-01-01 | End: 2023-01-01

## 2023-01-01 RX ORDER — ACETAMINOPHEN 650 MG/1
650 SUPPOSITORY RECTAL EVERY 6 HOURS PRN
Status: DISCONTINUED | OUTPATIENT
Start: 2023-01-01 | End: 2023-01-01 | Stop reason: HOSPADM

## 2023-01-01 RX ORDER — HYDROCODONE BITARTRATE AND ACETAMINOPHEN 5; 325 MG/1; MG/1
1 TABLET ORAL ONCE
Status: DISCONTINUED | OUTPATIENT
Start: 2023-01-01 | End: 2023-01-01

## 2023-01-01 RX ORDER — LORAZEPAM 2 MG/ML
1 INJECTION INTRAMUSCULAR
Status: DISCONTINUED | OUTPATIENT
Start: 2023-01-01 | End: 2023-01-01 | Stop reason: HOSPADM

## 2023-01-01 RX ORDER — SODIUM CHLORIDE, SODIUM LACTATE, POTASSIUM CHLORIDE, CALCIUM CHLORIDE 600; 310; 30; 20 MG/100ML; MG/100ML; MG/100ML; MG/100ML
INJECTION, SOLUTION INTRAVENOUS CONTINUOUS
Status: ACTIVE | OUTPATIENT
Start: 2023-01-01 | End: 2023-01-01

## 2023-01-01 RX ORDER — NALOXONE HYDROCHLORIDE 0.4 MG/ML
0.2 INJECTION, SOLUTION INTRAMUSCULAR; INTRAVENOUS; SUBCUTANEOUS
Status: DISCONTINUED | OUTPATIENT
Start: 2023-01-01 | End: 2023-01-01

## 2023-01-01 RX ORDER — ONDANSETRON 4 MG/1
4 TABLET, ORALLY DISINTEGRATING ORAL EVERY 30 MIN PRN
Status: CANCELLED | OUTPATIENT
Start: 2023-01-01

## 2023-01-01 RX ORDER — PROCHLORPERAZINE MALEATE 5 MG
5 TABLET ORAL EVERY 6 HOURS PRN
Status: DISCONTINUED | OUTPATIENT
Start: 2023-01-01 | End: 2023-01-01 | Stop reason: HOSPADM

## 2023-01-01 RX ORDER — IPRATROPIUM BROMIDE AND ALBUTEROL SULFATE 2.5; .5 MG/3ML; MG/3ML
3 SOLUTION RESPIRATORY (INHALATION) ONCE
Status: COMPLETED | OUTPATIENT
Start: 2023-01-01 | End: 2023-01-01

## 2023-01-01 RX ORDER — OXYCODONE HYDROCHLORIDE 5 MG/1
5 TABLET ORAL
Status: CANCELLED | OUTPATIENT
Start: 2023-01-01

## 2023-01-01 RX ORDER — BISACODYL 10 MG
10 SUPPOSITORY, RECTAL RECTAL DAILY PRN
Status: DISCONTINUED | OUTPATIENT
Start: 2023-01-01 | End: 2023-01-01 | Stop reason: HOSPADM

## 2023-01-01 RX ORDER — FENTANYL CITRATE 50 UG/ML
50 INJECTION, SOLUTION INTRAMUSCULAR; INTRAVENOUS EVERY 5 MIN PRN
Status: DISCONTINUED | OUTPATIENT
Start: 2023-01-01 | End: 2023-01-01 | Stop reason: HOSPADM

## 2023-01-01 RX ORDER — POLYETHYLENE GLYCOL 3350 17 G/17G
17 POWDER, FOR SOLUTION ORAL 2 TIMES DAILY PRN
Status: DISCONTINUED | OUTPATIENT
Start: 2023-01-01 | End: 2023-01-01

## 2023-01-01 RX ORDER — CEFAZOLIN SODIUM/WATER 2 G/20 ML
2 SYRINGE (ML) INTRAVENOUS SEE ADMIN INSTRUCTIONS
Status: DISCONTINUED | OUTPATIENT
Start: 2023-01-01 | End: 2023-01-01 | Stop reason: HOSPADM

## 2023-01-01 RX ORDER — HYDROMORPHONE HYDROCHLORIDE 1 MG/ML
2 SOLUTION ORAL
Qty: 473 ML | Refills: 0 | Status: SHIPPED | OUTPATIENT
Start: 2023-01-01 | End: 2023-01-01

## 2023-01-01 RX ORDER — HYDROMORPHONE HCL IN WATER/PF 6 MG/30 ML
0.2 PATIENT CONTROLLED ANALGESIA SYRINGE INTRAVENOUS EVERY 5 MIN PRN
Status: DISCONTINUED | OUTPATIENT
Start: 2023-01-01 | End: 2023-01-01 | Stop reason: HOSPADM

## 2023-01-01 RX ORDER — ACETAMINOPHEN 325 MG/1
650 TABLET ORAL EVERY 4 HOURS PRN
DISCHARGE
Start: 2023-01-01 | End: 2023-01-01

## 2023-01-01 RX ORDER — LIDOCAINE HYDROCHLORIDE 20 MG/ML
INJECTION, SOLUTION INFILTRATION; PERINEURAL PRN
Status: DISCONTINUED | OUTPATIENT
Start: 2023-01-01 | End: 2023-01-01

## 2023-01-01 RX ORDER — TRANEXAMIC ACID 100 MG/ML
1 INJECTION, SOLUTION INTRAVENOUS ONCE
Status: COMPLETED | OUTPATIENT
Start: 2023-01-01 | End: 2023-01-01

## 2023-01-01 RX ORDER — LABETALOL HYDROCHLORIDE 5 MG/ML
10 INJECTION, SOLUTION INTRAVENOUS EVERY 5 MIN PRN
Status: DISCONTINUED | OUTPATIENT
Start: 2023-01-01 | End: 2023-01-01 | Stop reason: HOSPADM

## 2023-01-01 RX ORDER — ACETAMINOPHEN 325 MG/1
650 TABLET ORAL EVERY 4 HOURS PRN
Status: DISCONTINUED | OUTPATIENT
Start: 2023-01-01 | End: 2023-01-01 | Stop reason: HOSPADM

## 2023-01-01 RX ORDER — OLANZAPINE 5 MG/1
5 TABLET, ORALLY DISINTEGRATING ORAL EVERY 6 HOURS PRN
Qty: 20 TABLET | Refills: 0 | Status: SHIPPED | OUTPATIENT
Start: 2023-01-01

## 2023-01-01 RX ORDER — NALOXONE HYDROCHLORIDE 0.4 MG/ML
0.1 INJECTION, SOLUTION INTRAMUSCULAR; INTRAVENOUS; SUBCUTANEOUS
Status: DISCONTINUED | OUTPATIENT
Start: 2023-01-01 | End: 2023-01-01 | Stop reason: HOSPADM

## 2023-01-01 RX ORDER — NALOXONE HYDROCHLORIDE 0.4 MG/ML
0.4 INJECTION, SOLUTION INTRAMUSCULAR; INTRAVENOUS; SUBCUTANEOUS
Status: DISCONTINUED | OUTPATIENT
Start: 2023-01-01 | End: 2023-01-01

## 2023-01-01 RX ORDER — NALOXONE HYDROCHLORIDE 0.4 MG/ML
0.2 INJECTION, SOLUTION INTRAMUSCULAR; INTRAVENOUS; SUBCUTANEOUS
Status: DISCONTINUED | OUTPATIENT
Start: 2023-01-01 | End: 2023-01-01 | Stop reason: HOSPADM

## 2023-01-01 RX ORDER — POLYETHYLENE GLYCOL 3350 17 G/17G
17 POWDER, FOR SOLUTION ORAL DAILY
Status: DISCONTINUED | OUTPATIENT
Start: 2023-01-01 | End: 2023-01-01 | Stop reason: HOSPADM

## 2023-01-01 RX ORDER — HALOPERIDOL 0.5 MG/1
1 TABLET ORAL EVERY 6 HOURS PRN
Qty: 30 TABLET | Refills: 0 | Status: SHIPPED | OUTPATIENT
Start: 2023-01-01 | End: 2023-01-01

## 2023-01-01 RX ORDER — OXYCODONE HYDROCHLORIDE 5 MG/1
2.5 TABLET ORAL EVERY 4 HOURS PRN
Qty: 25 TABLET | Refills: 0 | Status: SHIPPED | OUTPATIENT
Start: 2023-01-01 | End: 2023-01-01

## 2023-01-01 RX ORDER — ASPIRIN 81 MG/1
81 TABLET ORAL 2 TIMES DAILY
DISCHARGE
Start: 2023-01-01 | End: 2023-01-01

## 2023-01-01 RX ORDER — SENNOSIDES A AND B 8.6 MG/1
2 TABLET, FILM COATED ORAL 2 TIMES DAILY PRN
Qty: 100 TABLET | Refills: 0 | Status: SHIPPED | OUTPATIENT
Start: 2023-01-01 | End: 2023-01-01

## 2023-01-01 RX ORDER — BISACODYL 10 MG
10 SUPPOSITORY, RECTAL RECTAL DAILY PRN
Qty: 2 SUPPOSITORY | Refills: 0 | Status: SHIPPED | OUTPATIENT
Start: 2023-01-01

## 2023-01-01 RX ORDER — ACETAMINOPHEN 650 MG/1
650 SUPPOSITORY RECTAL EVERY 4 HOURS PRN
Qty: 4 SUPPOSITORY | Refills: 0 | Status: SHIPPED | OUTPATIENT
Start: 2023-01-01

## 2023-01-01 RX ORDER — ATROPINE SULFATE 10 MG/ML
2 SOLUTION/ DROPS OPHTHALMIC EVERY 4 HOURS PRN
Status: DISCONTINUED | OUTPATIENT
Start: 2023-01-01 | End: 2023-01-01 | Stop reason: HOSPADM

## 2023-01-01 RX ORDER — ONDANSETRON 2 MG/ML
4 INJECTION INTRAMUSCULAR; INTRAVENOUS EVERY 30 MIN PRN
Status: CANCELLED | OUTPATIENT
Start: 2023-01-01

## 2023-01-01 RX ORDER — HYDROMORPHONE HYDROCHLORIDE 1 MG/ML
2 SOLUTION ORAL
Status: DISCONTINUED | OUTPATIENT
Start: 2023-01-01 | End: 2023-01-01 | Stop reason: HOSPADM

## 2023-01-01 RX ORDER — CALCIUM CARBONATE 500 MG/1
1000 TABLET, CHEWABLE ORAL 4 TIMES DAILY PRN
Status: DISCONTINUED | OUTPATIENT
Start: 2023-01-01 | End: 2023-01-01 | Stop reason: HOSPADM

## 2023-01-01 RX ORDER — LORAZEPAM 0.5 MG/1
0.25 TABLET ORAL EVERY 4 HOURS PRN
Qty: 30 TABLET | Refills: 0 | Status: SHIPPED | OUTPATIENT
Start: 2023-01-01

## 2023-01-01 RX ORDER — ONDANSETRON 4 MG/1
4 TABLET, ORALLY DISINTEGRATING ORAL EVERY 30 MIN PRN
Status: DISCONTINUED | OUTPATIENT
Start: 2023-01-01 | End: 2023-01-01 | Stop reason: HOSPADM

## 2023-01-01 RX ORDER — SODIUM CHLORIDE 9 MG/ML
INJECTION, SOLUTION INTRAVENOUS CONTINUOUS
Status: ACTIVE | OUTPATIENT
Start: 2023-01-01 | End: 2023-01-01

## 2023-01-01 RX ORDER — LIDOCAINE 4 G/G
1 PATCH TOPICAL
Status: DISCONTINUED | OUTPATIENT
Start: 2023-01-01 | End: 2023-01-01 | Stop reason: HOSPADM

## 2023-01-01 RX ORDER — AMOXICILLIN 250 MG
1 CAPSULE ORAL DAILY PRN
Status: ON HOLD | COMMUNITY
End: 2023-01-01

## 2023-01-01 RX ORDER — HYDROMORPHONE HCL IN WATER/PF 6 MG/30 ML
0.4 PATIENT CONTROLLED ANALGESIA SYRINGE INTRAVENOUS EVERY 5 MIN PRN
Status: DISCONTINUED | OUTPATIENT
Start: 2023-01-01 | End: 2023-01-01 | Stop reason: HOSPADM

## 2023-01-01 RX ORDER — AMOXICILLIN 250 MG
2 CAPSULE ORAL 2 TIMES DAILY PRN
Status: DISCONTINUED | OUTPATIENT
Start: 2023-01-01 | End: 2023-01-01

## 2023-01-01 RX ORDER — OXYCODONE HYDROCHLORIDE 5 MG/1
5 TABLET ORAL EVERY 4 HOURS PRN
Status: DISCONTINUED | OUTPATIENT
Start: 2023-01-01 | End: 2023-01-01

## 2023-01-01 RX ORDER — LIDOCAINE 40 MG/G
CREAM TOPICAL
Status: DISCONTINUED | OUTPATIENT
Start: 2023-01-01 | End: 2023-01-01 | Stop reason: HOSPADM

## 2023-01-01 RX ORDER — PROPOFOL 10 MG/ML
INJECTION, EMULSION INTRAVENOUS PRN
Status: DISCONTINUED | OUTPATIENT
Start: 2023-01-01 | End: 2023-01-01

## 2023-01-01 RX ORDER — AMOXICILLIN 250 MG
1 CAPSULE ORAL 2 TIMES DAILY PRN
Status: DISCONTINUED | OUTPATIENT
Start: 2023-01-01 | End: 2023-01-01

## 2023-01-01 RX ORDER — HYDROMORPHONE HYDROCHLORIDE 2 MG/1
2 TABLET ORAL
Status: DISCONTINUED | OUTPATIENT
Start: 2023-01-01 | End: 2023-01-01 | Stop reason: HOSPADM

## 2023-01-01 RX ORDER — TRANEXAMIC ACID 650 MG/1
1950 TABLET ORAL ONCE
Status: CANCELLED | OUTPATIENT
Start: 2023-01-01 | End: 2023-01-01

## 2023-01-01 RX ORDER — OXYCODONE HYDROCHLORIDE 10 MG/1
10 TABLET ORAL EVERY 4 HOURS PRN
Status: DISCONTINUED | OUTPATIENT
Start: 2023-01-01 | End: 2023-01-01

## 2023-01-01 RX ORDER — CEFAZOLIN SODIUM/WATER 2 G/20 ML
2 SYRINGE (ML) INTRAVENOUS
Status: COMPLETED | OUTPATIENT
Start: 2023-01-01 | End: 2023-01-01

## 2023-01-01 RX ORDER — ACETAMINOPHEN 325 MG/1
975 TABLET ORAL 3 TIMES DAILY
Status: DISCONTINUED | OUTPATIENT
Start: 2023-01-01 | End: 2023-01-01

## 2023-01-01 RX ORDER — LORAZEPAM 1 MG/1
1 TABLET ORAL
Status: DISCONTINUED | OUTPATIENT
Start: 2023-01-01 | End: 2023-01-01 | Stop reason: HOSPADM

## 2023-01-01 RX ORDER — SODIUM CHLORIDE, SODIUM LACTATE, POTASSIUM CHLORIDE, CALCIUM CHLORIDE 600; 310; 30; 20 MG/100ML; MG/100ML; MG/100ML; MG/100ML
INJECTION, SOLUTION INTRAVENOUS CONTINUOUS
Status: DISCONTINUED | OUTPATIENT
Start: 2023-01-01 | End: 2023-01-01 | Stop reason: HOSPADM

## 2023-01-01 RX ORDER — ASPIRIN 81 MG/1
81 TABLET ORAL 2 TIMES DAILY
Status: DISCONTINUED | OUTPATIENT
Start: 2023-01-01 | End: 2023-01-01

## 2023-01-01 RX ORDER — OLANZAPINE 5 MG/1
5 TABLET, ORALLY DISINTEGRATING ORAL EVERY 6 HOURS PRN
Status: DISCONTINUED | OUTPATIENT
Start: 2023-01-01 | End: 2023-01-01 | Stop reason: HOSPADM

## 2023-01-01 RX ORDER — ATROPINE SULFATE 10 MG/ML
2 SOLUTION/ DROPS OPHTHALMIC EVERY 4 HOURS PRN
Qty: 5 ML | Refills: 0 | Status: SHIPPED | OUTPATIENT
Start: 2023-01-01

## 2023-01-01 RX ORDER — CEFAZOLIN SODIUM 1 G/3ML
1 INJECTION, POWDER, FOR SOLUTION INTRAMUSCULAR; INTRAVENOUS EVERY 8 HOURS
Qty: 10 ML | Refills: 0 | Status: COMPLETED | OUTPATIENT
Start: 2023-01-01 | End: 2023-01-01

## 2023-01-01 RX ORDER — SODIUM CHLORIDE, SODIUM LACTATE, POTASSIUM CHLORIDE, CALCIUM CHLORIDE 600; 310; 30; 20 MG/100ML; MG/100ML; MG/100ML; MG/100ML
INJECTION, SOLUTION INTRAVENOUS CONTINUOUS
Status: DISCONTINUED | OUTPATIENT
Start: 2023-01-01 | End: 2023-01-01

## 2023-01-01 RX ORDER — MAGNESIUM HYDROXIDE 1200 MG/15ML
LIQUID ORAL PRN
Status: DISCONTINUED | OUTPATIENT
Start: 2023-01-01 | End: 2023-01-01 | Stop reason: HOSPADM

## 2023-01-01 RX ORDER — ACETAMINOPHEN 325 MG/1
975 TABLET ORAL ONCE
Status: COMPLETED | OUTPATIENT
Start: 2023-01-01 | End: 2023-01-01

## 2023-01-01 RX ORDER — ONDANSETRON 2 MG/ML
4 INJECTION INTRAMUSCULAR; INTRAVENOUS EVERY 30 MIN PRN
Status: DISCONTINUED | OUTPATIENT
Start: 2023-01-01 | End: 2023-01-01 | Stop reason: HOSPADM

## 2023-01-01 RX ORDER — LIDOCAINE 40 MG/G
CREAM TOPICAL
Status: DISCONTINUED | OUTPATIENT
Start: 2023-01-01 | End: 2023-01-01

## 2023-01-01 RX ORDER — OLANZAPINE 5 MG/1
5 TABLET, ORALLY DISINTEGRATING ORAL AT BEDTIME
Status: COMPLETED | OUTPATIENT
Start: 2023-01-01 | End: 2023-01-01

## 2023-01-01 RX ORDER — ONDANSETRON 4 MG/1
4 TABLET, ORALLY DISINTEGRATING ORAL EVERY 6 HOURS PRN
Status: DISCONTINUED | OUTPATIENT
Start: 2023-01-01 | End: 2023-01-01 | Stop reason: HOSPADM

## 2023-01-01 RX ORDER — OXYCODONE HYDROCHLORIDE 5 MG/1
10 TABLET ORAL
Status: CANCELLED | OUTPATIENT
Start: 2023-01-01

## 2023-01-01 RX ORDER — FENTANYL CITRATE 50 UG/ML
INJECTION, SOLUTION INTRAMUSCULAR; INTRAVENOUS PRN
Status: DISCONTINUED | OUTPATIENT
Start: 2023-01-01 | End: 2023-01-01

## 2023-01-01 RX ORDER — AMOXICILLIN 250 MG
2 CAPSULE ORAL 2 TIMES DAILY PRN
DISCHARGE
Start: 2023-01-01 | End: 2023-01-01

## 2023-01-01 RX ORDER — SALIVA STIMULANT COMB. NO.3
1 SPRAY, NON-AEROSOL (ML) MUCOUS MEMBRANE 4 TIMES DAILY
Status: DISCONTINUED | OUTPATIENT
Start: 2023-01-01 | End: 2023-01-01 | Stop reason: HOSPADM

## 2023-01-01 RX ORDER — AMOXICILLIN 250 MG
1 CAPSULE ORAL 2 TIMES DAILY
Status: DISCONTINUED | OUTPATIENT
Start: 2023-01-01 | End: 2023-01-01 | Stop reason: HOSPADM

## 2023-01-01 RX ORDER — ONDANSETRON 2 MG/ML
4 INJECTION INTRAMUSCULAR; INTRAVENOUS EVERY 6 HOURS PRN
Status: DISCONTINUED | OUTPATIENT
Start: 2023-01-01 | End: 2023-01-01

## 2023-01-01 RX ORDER — ONDANSETRON 2 MG/ML
4 INJECTION INTRAMUSCULAR; INTRAVENOUS EVERY 6 HOURS PRN
Status: DISCONTINUED | OUTPATIENT
Start: 2023-01-01 | End: 2023-01-01 | Stop reason: HOSPADM

## 2023-01-01 RX ORDER — HYDROMORPHONE HYDROCHLORIDE 2 MG/1
1 TABLET ORAL
Qty: 30 TABLET | Refills: 0 | Status: SHIPPED | OUTPATIENT
Start: 2023-01-01

## 2023-01-01 RX ORDER — LORAZEPAM 1 MG/1
1 TABLET ORAL
Qty: 20 TABLET | Refills: 0 | Status: SHIPPED | OUTPATIENT
Start: 2023-01-01 | End: 2023-01-01

## 2023-01-01 RX ORDER — FENTANYL CITRATE 50 UG/ML
25 INJECTION, SOLUTION INTRAMUSCULAR; INTRAVENOUS EVERY 5 MIN PRN
Status: DISCONTINUED | OUTPATIENT
Start: 2023-01-01 | End: 2023-01-01 | Stop reason: HOSPADM

## 2023-01-01 RX ORDER — HYDROMORPHONE HYDROCHLORIDE 1 MG/ML
1 SOLUTION ORAL
Status: DISCONTINUED | OUTPATIENT
Start: 2023-01-01 | End: 2023-01-01 | Stop reason: HOSPADM

## 2023-01-01 RX ORDER — ONDANSETRON 4 MG/1
4 TABLET, ORALLY DISINTEGRATING ORAL EVERY 6 HOURS PRN
Status: DISCONTINUED | OUTPATIENT
Start: 2023-01-01 | End: 2023-01-01

## 2023-01-01 RX ADMIN — ACETAMINOPHEN 650 MG: 325 TABLET, FILM COATED ORAL at 19:25

## 2023-01-01 RX ADMIN — ACETAMINOPHEN 975 MG: 325 TABLET, FILM COATED ORAL at 04:02

## 2023-01-01 RX ADMIN — POLYETHYLENE GLYCOL 3350 17 G: 17 POWDER, FOR SOLUTION ORAL at 09:10

## 2023-01-01 RX ADMIN — LIDOCAINE 1 PATCH: 4 PATCH TOPICAL at 19:27

## 2023-01-01 RX ADMIN — Medication 1 SPRAY: at 22:18

## 2023-01-01 RX ADMIN — ACETAMINOPHEN 975 MG: 325 TABLET, FILM COATED ORAL at 12:43

## 2023-01-01 RX ADMIN — SENNOSIDES AND DOCUSATE SODIUM 1 TABLET: 8.6; 5 TABLET ORAL at 20:04

## 2023-01-01 RX ADMIN — LIDOCAINE 1 PATCH: 4 PATCH TOPICAL at 20:27

## 2023-01-01 RX ADMIN — LORAZEPAM 1 MG: 1 TABLET ORAL at 08:15

## 2023-01-01 RX ADMIN — OLANZAPINE 5 MG: 5 TABLET, ORALLY DISINTEGRATING ORAL at 21:31

## 2023-01-01 RX ADMIN — SENNOSIDES AND DOCUSATE SODIUM 1 TABLET: 8.6; 5 TABLET ORAL at 09:47

## 2023-01-01 RX ADMIN — ACETAMINOPHEN 975 MG: 325 TABLET, FILM COATED ORAL at 19:59

## 2023-01-01 RX ADMIN — ACETAMINOPHEN 650 MG: 325 TABLET, FILM COATED ORAL at 09:39

## 2023-01-01 RX ADMIN — ASPIRIN 81 MG: 81 TABLET, COATED ORAL at 09:39

## 2023-01-01 RX ADMIN — ASPIRIN 81 MG: 81 TABLET, COATED ORAL at 10:16

## 2023-01-01 RX ADMIN — Medication 1 SPRAY: at 20:16

## 2023-01-01 RX ADMIN — Medication 1 SPRAY: at 09:43

## 2023-01-01 RX ADMIN — ACETAMINOPHEN 975 MG: 325 TABLET, FILM COATED ORAL at 12:33

## 2023-01-01 RX ADMIN — SENNOSIDES AND DOCUSATE SODIUM 1 TABLET: 8.6; 5 TABLET ORAL at 19:26

## 2023-01-01 RX ADMIN — ACETAMINOPHEN 975 MG: 325 TABLET, FILM COATED ORAL at 20:15

## 2023-01-01 RX ADMIN — SENNOSIDES AND DOCUSATE SODIUM 1 TABLET: 8.6; 5 TABLET ORAL at 20:15

## 2023-01-01 RX ADMIN — SENNOSIDES AND DOCUSATE SODIUM 1 TABLET: 8.6; 5 TABLET ORAL at 09:43

## 2023-01-01 RX ADMIN — ASPIRIN 81 MG: 81 TABLET, COATED ORAL at 20:15

## 2023-01-01 RX ADMIN — SODIUM CHLORIDE, POTASSIUM CHLORIDE, SODIUM LACTATE AND CALCIUM CHLORIDE: 600; 310; 30; 20 INJECTION, SOLUTION INTRAVENOUS at 03:06

## 2023-01-01 RX ADMIN — SENNOSIDES AND DOCUSATE SODIUM 1 TABLET: 8.6; 5 TABLET ORAL at 09:39

## 2023-01-01 RX ADMIN — Medication 1 SPRAY: at 20:18

## 2023-01-01 RX ADMIN — PHENYLEPHRINE HYDROCHLORIDE 0.2 MCG/KG/MIN: 10 INJECTION INTRAVENOUS at 07:43

## 2023-01-01 RX ADMIN — ACETAMINOPHEN 975 MG: 325 TABLET, FILM COATED ORAL at 03:52

## 2023-01-01 RX ADMIN — SENNOSIDES AND DOCUSATE SODIUM 1 TABLET: 8.6; 5 TABLET ORAL at 09:10

## 2023-01-01 RX ADMIN — ROCURONIUM BROMIDE 50 MG: 50 INJECTION, SOLUTION INTRAVENOUS at 07:34

## 2023-01-01 RX ADMIN — ONDANSETRON 4 MG: 2 INJECTION INTRAMUSCULAR; INTRAVENOUS at 08:24

## 2023-01-01 RX ADMIN — LORAZEPAM 1 MG: 1 TABLET ORAL at 03:46

## 2023-01-01 RX ADMIN — ASPIRIN 81 MG: 81 TABLET, COATED ORAL at 12:33

## 2023-01-01 RX ADMIN — ASPIRIN 81 MG: 81 TABLET, COATED ORAL at 20:14

## 2023-01-01 RX ADMIN — SODIUM CHLORIDE, POTASSIUM CHLORIDE, SODIUM LACTATE AND CALCIUM CHLORIDE: 600; 310; 30; 20 INJECTION, SOLUTION INTRAVENOUS at 14:40

## 2023-01-01 RX ADMIN — ASPIRIN 81 MG: 81 TABLET, COATED ORAL at 21:56

## 2023-01-01 RX ADMIN — ACETAMINOPHEN 650 MG: 325 TABLET, FILM COATED ORAL at 09:29

## 2023-01-01 RX ADMIN — ASPIRIN 81 MG: 81 TABLET, COATED ORAL at 09:47

## 2023-01-01 RX ADMIN — SENNOSIDES AND DOCUSATE SODIUM 1 TABLET: 8.6; 5 TABLET ORAL at 21:56

## 2023-01-01 RX ADMIN — ACETAMINOPHEN 975 MG: 325 TABLET, FILM COATED ORAL at 11:46

## 2023-01-01 RX ADMIN — SODIUM CHLORIDE, POTASSIUM CHLORIDE, SODIUM LACTATE AND CALCIUM CHLORIDE: 600; 310; 30; 20 INJECTION, SOLUTION INTRAVENOUS at 07:06

## 2023-01-01 RX ADMIN — LORAZEPAM 1 MG: 1 TABLET ORAL at 14:53

## 2023-01-01 RX ADMIN — ACETAMINOPHEN 975 MG: 325 TABLET, FILM COATED ORAL at 20:25

## 2023-01-01 RX ADMIN — ACETAMINOPHEN 975 MG: 325 TABLET, FILM COATED ORAL at 03:08

## 2023-01-01 RX ADMIN — Medication 1 SPRAY: at 20:34

## 2023-01-01 RX ADMIN — LIDOCAINE 1 PATCH: 4 PATCH TOPICAL at 20:45

## 2023-01-01 RX ADMIN — ACETAMINOPHEN 650 MG: 325 TABLET, FILM COATED ORAL at 04:17

## 2023-01-01 RX ADMIN — PHENYLEPHRINE HYDROCHLORIDE 100 MCG: 10 INJECTION INTRAVENOUS at 07:38

## 2023-01-01 RX ADMIN — ASPIRIN 81 MG: 81 TABLET, COATED ORAL at 20:04

## 2023-01-01 RX ADMIN — ACETAMINOPHEN 650 MG: 325 TABLET, FILM COATED ORAL at 09:43

## 2023-01-01 RX ADMIN — SENNOSIDES AND DOCUSATE SODIUM 1 TABLET: 8.6; 5 TABLET ORAL at 10:00

## 2023-01-01 RX ADMIN — Medication 1 SPRAY: at 12:51

## 2023-01-01 RX ADMIN — OXYCODONE HYDROCHLORIDE 2.5 MG: 5 TABLET ORAL at 16:53

## 2023-01-01 RX ADMIN — ACETAMINOPHEN 650 MG: 325 TABLET, FILM COATED ORAL at 14:50

## 2023-01-01 RX ADMIN — ASPIRIN 81 MG: 81 TABLET, COATED ORAL at 09:43

## 2023-01-01 RX ADMIN — Medication 1 SPRAY: at 09:42

## 2023-01-01 RX ADMIN — LIDOCAINE 1 PATCH: 4 PATCH TOPICAL at 20:13

## 2023-01-01 RX ADMIN — TRANEXAMIC ACID 0.5 G: 1 INJECTION, SOLUTION INTRAVENOUS at 07:37

## 2023-01-01 RX ADMIN — SENNOSIDES AND DOCUSATE SODIUM 1 TABLET: 8.6; 5 TABLET ORAL at 12:33

## 2023-01-01 RX ADMIN — POLYETHYLENE GLYCOL 3350 17 G: 17 POWDER, FOR SOLUTION ORAL at 09:59

## 2023-01-01 RX ADMIN — ACETAMINOPHEN 650 MG: 325 TABLET, FILM COATED ORAL at 10:16

## 2023-01-01 RX ADMIN — POLYETHYLENE GLYCOL 3350 17 G: 17 POWDER, FOR SOLUTION ORAL at 09:43

## 2023-01-01 RX ADMIN — SENNOSIDES AND DOCUSATE SODIUM 1 TABLET: 8.6; 5 TABLET ORAL at 20:26

## 2023-01-01 RX ADMIN — FENTANYL CITRATE 100 MCG: 50 INJECTION INTRAMUSCULAR; INTRAVENOUS at 07:33

## 2023-01-01 RX ADMIN — SODIUM CHLORIDE: 9 INJECTION, SOLUTION INTRAVENOUS at 16:52

## 2023-01-01 RX ADMIN — CEFAZOLIN 1 G: 1 INJECTION, POWDER, FOR SOLUTION INTRAMUSCULAR; INTRAVENOUS at 05:09

## 2023-01-01 RX ADMIN — LIDOCAINE 1 PATCH: 4 PATCH TOPICAL at 21:55

## 2023-01-01 RX ADMIN — SUGAMMADEX 200 MG: 100 INJECTION, SOLUTION INTRAVENOUS at 08:38

## 2023-01-01 RX ADMIN — ACETAMINOPHEN 975 MG: 325 TABLET, FILM COATED ORAL at 20:03

## 2023-01-01 RX ADMIN — OXYCODONE HYDROCHLORIDE 5 MG: 5 TABLET ORAL at 09:10

## 2023-01-01 RX ADMIN — Medication 1 SPRAY: at 09:14

## 2023-01-01 RX ADMIN — Medication 1 SPRAY: at 09:49

## 2023-01-01 RX ADMIN — ASPIRIN 81 MG: 81 TABLET, COATED ORAL at 09:10

## 2023-01-01 RX ADMIN — ASPIRIN 81 MG: 81 TABLET, COATED ORAL at 09:29

## 2023-01-01 RX ADMIN — SENNOSIDES AND DOCUSATE SODIUM 1 TABLET: 8.6; 5 TABLET ORAL at 09:29

## 2023-01-01 RX ADMIN — SODIUM CHLORIDE: 9 INJECTION, SOLUTION INTRAVENOUS at 02:35

## 2023-01-01 RX ADMIN — ASPIRIN 81 MG: 81 TABLET, COATED ORAL at 19:26

## 2023-01-01 RX ADMIN — SODIUM CHLORIDE, POTASSIUM CHLORIDE, SODIUM LACTATE AND CALCIUM CHLORIDE: 600; 310; 30; 20 INJECTION, SOLUTION INTRAVENOUS at 16:46

## 2023-01-01 RX ADMIN — POLYETHYLENE GLYCOL 3350 17 G: 17 POWDER, FOR SOLUTION ORAL at 09:46

## 2023-01-01 RX ADMIN — SODIUM CHLORIDE, POTASSIUM CHLORIDE, SODIUM LACTATE AND CALCIUM CHLORIDE: 600; 310; 30; 20 INJECTION, SOLUTION INTRAVENOUS at 06:16

## 2023-01-01 RX ADMIN — Medication 1 SPRAY: at 14:50

## 2023-01-01 RX ADMIN — LIDOCAINE HYDROCHLORIDE 30 MG: 20 INJECTION, SOLUTION INFILTRATION; PERINEURAL at 07:33

## 2023-01-01 RX ADMIN — PHENYLEPHRINE HYDROCHLORIDE 100 MCG: 10 INJECTION INTRAVENOUS at 07:49

## 2023-01-01 RX ADMIN — HYDROMORPHONE HYDROCHLORIDE 1 MG: 5 SOLUTION ORAL at 09:42

## 2023-01-01 RX ADMIN — PROPOFOL 90 MG: 10 INJECTION, EMULSION INTRAVENOUS at 07:33

## 2023-01-01 RX ADMIN — CEFAZOLIN 1 G: 1 INJECTION, POWDER, FOR SOLUTION INTRAMUSCULAR; INTRAVENOUS at 21:36

## 2023-01-01 RX ADMIN — SENNOSIDES AND DOCUSATE SODIUM 1 TABLET: 8.6; 5 TABLET ORAL at 20:14

## 2023-01-01 RX ADMIN — Medication 1 SPRAY: at 09:33

## 2023-01-01 RX ADMIN — Medication 1 SPRAY: at 20:05

## 2023-01-01 RX ADMIN — ASPIRIN 81 MG: 81 TABLET, COATED ORAL at 20:26

## 2023-01-01 RX ADMIN — Medication 1 SPRAY: at 19:27

## 2023-01-01 RX ADMIN — IPRATROPIUM BROMIDE AND ALBUTEROL SULFATE 3 ML: .5; 3 SOLUTION RESPIRATORY (INHALATION) at 09:12

## 2023-01-01 RX ADMIN — TRANEXAMIC ACID 0.5 G: 1 INJECTION, SOLUTION INTRAVENOUS at 08:23

## 2023-01-01 RX ADMIN — SODIUM CHLORIDE, POTASSIUM CHLORIDE, SODIUM LACTATE AND CALCIUM CHLORIDE: 600; 310; 30; 20 INJECTION, SOLUTION INTRAVENOUS at 11:52

## 2023-01-01 RX ADMIN — SODIUM CHLORIDE, POTASSIUM CHLORIDE, SODIUM LACTATE AND CALCIUM CHLORIDE: 600; 310; 30; 20 INJECTION, SOLUTION INTRAVENOUS at 10:28

## 2023-01-01 RX ADMIN — ACETAMINOPHEN 650 MG: 325 TABLET, FILM COATED ORAL at 04:23

## 2023-01-01 RX ADMIN — Medication 2 G: at 07:28

## 2023-01-01 RX ADMIN — POLYETHYLENE GLYCOL 3350 17 G: 17 POWDER, FOR SOLUTION ORAL at 09:29

## 2023-01-01 RX ADMIN — HYDROMORPHONE HYDROCHLORIDE 1 MG: 2 TABLET ORAL at 17:49

## 2023-01-01 ASSESSMENT — ACTIVITIES OF DAILY LIVING (ADL)
ADLS_ACUITY_SCORE: 61
ADLS_ACUITY_SCORE: 35
ADLS_ACUITY_SCORE: 47
ADLS_ACUITY_SCORE: 53
ADLS_ACUITY_SCORE: 61
DEPENDENT_IADLS:: CLEANING;COOKING;LAUNDRY;SHOPPING;MEAL PREPARATION;MEDICATION MANAGEMENT;MONEY MANAGEMENT;TRANSPORTATION
ADLS_ACUITY_SCORE: 61
ADLS_ACUITY_SCORE: 39
ADLS_ACUITY_SCORE: 63
ADLS_ACUITY_SCORE: 55
ADLS_ACUITY_SCORE: 61
ADLS_ACUITY_SCORE: 61
ADLS_ACUITY_SCORE: 57
ADLS_ACUITY_SCORE: 53
ADLS_ACUITY_SCORE: 61
ADLS_ACUITY_SCORE: 61
ADLS_ACUITY_SCORE: 39
ADLS_ACUITY_SCORE: 46
ADLS_ACUITY_SCORE: 61
ADLS_ACUITY_SCORE: 47
ADLS_ACUITY_SCORE: 61
ADLS_ACUITY_SCORE: 57
ADLS_ACUITY_SCORE: 55
ADLS_ACUITY_SCORE: 55
ADLS_ACUITY_SCORE: 35
ADLS_ACUITY_SCORE: 61
ADLS_ACUITY_SCORE: 57
ADLS_ACUITY_SCORE: 61
ADLS_ACUITY_SCORE: 57
ADLS_ACUITY_SCORE: 59
ADLS_ACUITY_SCORE: 61
ADLS_ACUITY_SCORE: 63
ADLS_ACUITY_SCORE: 61
ADLS_ACUITY_SCORE: 57
ADLS_ACUITY_SCORE: 55
ADLS_ACUITY_SCORE: 61
ADLS_ACUITY_SCORE: 39
ADLS_ACUITY_SCORE: 61
ADLS_ACUITY_SCORE: 63
ADLS_ACUITY_SCORE: 61
ADLS_ACUITY_SCORE: 47
ADLS_ACUITY_SCORE: 47
ADLS_ACUITY_SCORE: 61
ADLS_ACUITY_SCORE: 53
ADLS_ACUITY_SCORE: 61
ADLS_ACUITY_SCORE: 47
ADLS_ACUITY_SCORE: 61
ADLS_ACUITY_SCORE: 63
ADLS_ACUITY_SCORE: 55
ADLS_ACUITY_SCORE: 57
ADLS_ACUITY_SCORE: 61
ADLS_ACUITY_SCORE: 63
ADLS_ACUITY_SCORE: 61
ADLS_ACUITY_SCORE: 55
ADLS_ACUITY_SCORE: 55
ADLS_ACUITY_SCORE: 63
ADLS_ACUITY_SCORE: 63
ADLS_ACUITY_SCORE: 61
ADLS_ACUITY_SCORE: 53
ADLS_ACUITY_SCORE: 53
ADLS_ACUITY_SCORE: 39
ADLS_ACUITY_SCORE: 61
ADLS_ACUITY_SCORE: 57
ADLS_ACUITY_SCORE: 63
ADLS_ACUITY_SCORE: 61
ADLS_ACUITY_SCORE: 63
ADLS_ACUITY_SCORE: 59
ADLS_ACUITY_SCORE: 61
ADLS_ACUITY_SCORE: 63
ADLS_ACUITY_SCORE: 59
ADLS_ACUITY_SCORE: 57
ADLS_ACUITY_SCORE: 63
ADLS_ACUITY_SCORE: 63
ADLS_ACUITY_SCORE: 61
ADLS_ACUITY_SCORE: 35
ADLS_ACUITY_SCORE: 59
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
## 2023-12-10 PROBLEM — S72.001A CLOSED FRACTURE OF NECK OF RIGHT FEMUR, INITIAL ENCOUNTER (H): Status: ACTIVE | Noted: 2023-01-01

## 2023-12-10 PROBLEM — S32.82XA MULTIPLE CLOSED FRACTURES OF PELVIS WITHOUT DISRUPTION OF PELVIC RING, INITIAL ENCOUNTER (H): Status: ACTIVE | Noted: 2023-01-01

## 2023-12-10 NOTE — ED NOTES
[facility]  ED Nurse Handoff Report    Mel Lazaro is a 89 year old female   ED Chief complaint: Hip Pain  . ED Diagnosis:   Final diagnoses:   Multiple closed fractures of pelvis without disruption of pelvic ring, initial encounter (H)   Fall, initial encounter     Allergies: No Known Allergies    Code Status: Full Code  Activity level - Baseline/Home:  Independent.   Activity Level - Current:   Assist X 2.   Lift room needed: No.   Bariatric: No   Needed: No   Isolation: No.   Infection: Not Applicable.     Vital Signs:   Vitals:    12/10/23 1217   BP: 121/63   Pulse: 71   Resp: 24   Temp: 98  F (36.7  C)   TempSrc: Temporal   SpO2: 96%       Cardiac Rhythm:  ,      Pain level:    Patient confused: Yes.   Patient Falls Risk: Yes.   Elimination Status: Has voided   Patient Report - Initial Complaint: Fall.   Focused Assessment: Mel Lazaro is a 89 year old female who presents to the ED for evaluation of a fall.  She lives alone but has 3 adult daughters who check on her multiple times per day.  There is a video camera in the residence.  Reportedly she fell in the hallway where she was observed on the camera and was found this morning by her daughter.  She complains of pain of the right hip.  The patient is otherwise generally healthy.  She denies any syncope.  She denies headache.  She does not use any medications other than a stool softener from time to time.    Tests Performed:   Labs Ordered and Resulted from Time of ED Arrival to Time of ED Departure   BASIC METABOLIC PANEL - Abnormal       Result Value    Sodium 134 (*)     Potassium 4.0      Chloride 99      Carbon Dioxide (CO2) 27      Anion Gap 8      Urea Nitrogen 25.8 (*)     Creatinine 1.43 (*)     GFR Estimate 34 (*)     Calcium 8.5      Glucose 119 (*)    CBC WITH PLATELETS AND DIFFERENTIAL - Abnormal    WBC Count 10.4      RBC Count 3.86      Hemoglobin 11.6 (*)     Hematocrit 36.1      MCV 94      MCH 30.1      MCHC  32.1      RDW 13.4      Platelet Count 257      % Neutrophils 85      % Lymphocytes 8      % Monocytes 7      % Eosinophils 0      % Basophils 0      % Immature Granulocytes 0      NRBCs per 100 WBC 0      Absolute Neutrophils 8.7 (*)     Absolute Lymphocytes 0.8      Absolute Monocytes 0.7      Absolute Eosinophils 0.0      Absolute Basophils 0.0      Absolute Immature Granulocytes 0.0      Absolute NRBCs 0.0     ROUTINE UA WITH MICROSCOPIC REFLEX TO CULTURE      XR Pelvis and Hip Right 2 Views   Final Result   IMPRESSION: Anatomic alignment right hip. Subtle lucency at the subcapital region of the right femoral neck could represent summation artifact or nondisplaced fracture. Bony irregularity at the right inferior pubic ramus is concerning for nondisplaced    right inferior pubic ramus fracture with likely nondisplaced fracture at the right puboacetabular junction superiorly near the lateral right superior pubic ramus. Mild bilateral hip osteoarthritis with chondrocalcinosis. Diffuse bone demineralization.    Degenerative change lower lumbar spine and both SI joints.      Right hip MRI recommended (assuming no MRI contraindication).      NOTE: ABNORMAL REPORT      THE DICTATION ABOVE DESCRIBES AN ABNORMALITY FOR WHICH FOLLOW-UP IS NEEDED.             Lumbar spine CT w/o contrast   Final Result   IMPRESSION:     1.  Age-indeterminate superior endplate L2 compression fracture with 25% vertebral body height loss.   2.  No other evidence of acute fracture or subluxation of the lumbar spine by CT imaging.   3.  Degenerative lumbar spondylosis with level by level analysis as described above.      CT Cervical Spine w/o Contrast   Final Result   IMPRESSION:   1.  No evidence of acute fracture or subluxation of the cervical spine by CT imaging.   2.  Degenerative cervical spondylosis as described above.      CT Head w/o Contrast   Final Result   IMPRESSION:     1.  No evidence of acute intracranial hemorrhage or mass  effect.   2.  Moderate nonspecific white matter changes.   3.  Moderate brain parenchymal volume loss.      CT Hip Right w/o Contrast    (Results Pending)     Family Comments: Daughters highly involved in care  OBS brochure/video discussed/provided to patient:  No  ED Medications:   Medications   HYDROcodone-acetaminophen (NORCO) 5-325 MG per tablet 1 tablet (has no administration in time range)     Drips infusing:  No  For the majority of the shift, the patient's behavior Green.   Interventions performed were NA.    Sepsis treatment initiated: No     Patient tested for COVID 19 prior to admission: NO    ED Nurse Name/Phone Number: Martita Carson RN,   2:48 PM

## 2023-12-10 NOTE — CONSULTS
CONSULT NOTE  Location: [facility] ED     SUBJECTIVE  Mel Lazaro is a 89 year old with a history of dementia who fell on 12/9/23 at her home and complained of right hip pain. She was unable to ambulate after the fall. History was provided by her 3 daughters who are at her bedside in the ED. She lives alone but has 3 daughters who live nearby and check on her multiple times per day. At baseline, she ambulates around her home without any assistive devices. She does not leave her home. The daughters state she does not seem to be in pain anywhere other than the right hip. They do not think she hit her head. She is otherwise healthy.       PAST MEDICAL HISTORY  Dementia     SOCIAL HISTORY  Lives alone in her home. She has 3 daughters who check on her multiple times per day. Her home is outfitted with cameras that the daughters can also use to check on her.     PHYSICAL EXAM  General: No acute distress. Not alert or oriented.   Cardiac: Regular rate and rhythm   Respiratory: Breathing comfortably on room air  Musculoskeletal: Pain with any movement of the RLE. No pain with ROM in bilateral upper extremities.   Neuro: She is actively firing her quads, TA, EHL and gastroc.   Skin: No previous incisions or wounds over the right hip.     IMAGING  Radiographs of the right hip and pelvis demonstrate a right superior pubic ramus fracture. Radiographs demonstrate a nondisplaced subcapital femoral neck fracture.         ASSESSMENT/PLAN  #1 Right nondisplaced subcapital femoral neck fracture  #2 Dementia    Mel is a 89 year old female with a history of dementia who unfortunately fell at her home on 12/9/23. History is provided by her 3 daughters who check on her multiple times per day. Since the fall, she has not been able to bear weight and complains of right hip pain. Radiographs demonstrate a nondisplaced right subcapital femoral neck fracture. At baseline, she ambulates without assistive devices. After discussing  the risks and benefits of both operative and nonoperative management with the patient and her daughter, the decision was made to proceed with surgery. We discussed that the goal of surgery is to stabilize the fracture and allow her to bear weight immediately. We discussed that in this setting, we oftentimes think of surgery as a palliative procedure to treat pain and allow patients to bear weight. She will be admitted to the hospitalist service. She will undergo preoperative medical evaluation and if she is cleared for surgery, we can proceed with surgery tomorrow morning. Please keep her NPO tonight. Please hold AM DVT prophylaxis. Please do not hesitate to contact me with any further questions or concerns.     OR 12/11  NPO at 2400 on 12/10  Hold DVT prophylaxis

## 2023-12-10 NOTE — PHARMACY-ADMISSION MEDICATION HISTORY
Pharmacist Admission Medication History    Admission medication history is complete. The information provided in this note is only as accurate as the sources available at the time of the update.    Information Source(s): Family member and Clinic records via in-person    Pertinent Information: patient is not on any prescription medications per family members.    Changes made to PTA medication list:  Added: melatonin, Senokot-S  Deleted: aspirin 325 mg daily, TUMS PRN, hydrochlorothiazide 12.5 mg daily, Synthroid 50 mcg daily, quinapril 20 mg daily, sertraline 75 mg daily, simvastatin 40 mg daily  Changed: None    Medication Affordability:  Not including over the counter (OTC) medications, was there a time in the past 3 months when you did not take your medications as prescribed because of cost?: Unable to Assess    Allergies reviewed with patient and updates made in EHR: yes    Medication History Completed By: John Rodriguez [facility] 12/10/2023 1:49 PM    Prior to Admission medications    Medication Sig Last Dose Taking? Auth Provider Long Term End Date   MELATONIN PO Take 1 tablet by mouth nightly as needed for sleep Unknown at PRN Yes Unknown, Entered By History     senna-docusate (SENOKOT-S/PERICOLACE) 8.6-50 MG tablet Take 1 tablet by mouth daily as needed for constipation Unknown at PRN Yes Unknown, Entered By History

## 2023-12-10 NOTE — H&P
[facility]    History and Physical - Hospitalist Service       Date of Admission:  12/10/2023    Assessment & Plan Mel Lazaro is a 89 year old female with Pmhx of CKD III, former tobacco dependence with possible COPD, dementia, HTN, hypothyroidism, who was admitted on 12/10/2023 after an unwitnessed fall presented with right hip pain. Found to have right femoral neck, acetabular and pubic rami fractures. Admitted for further cares with Orthopedic surgery consultation.       Fall, unwitnessed   Right femoral neck, acetabular and pubic rami fractures  Right Hip Effusion   Fell afternoon of 12/9, unwitnessed. Independent with ambulation at baseline. No preceding illness or known presyncopal symptoms prior to fall. She has assistance from her daughter's who take shifts caring for her in the home, when they are not there they have cameras to various points in the home for monitoring.  Brought in for right hip pain, difficulty mobilizing. Found to have right femoral neck, acetabular and right pubic rami fractures. Admitted for further cares with Orthopedic surgery consultation.   No hx of previous adverse issues with anesthesia, no known cardiopulmonary disease.   -admit IP  -lower suspicion for rhabdo given was not down unassisted for long, added UA and CK for completeness   -Ortho consulted, surgery anticipated AM Of 12/11   -NPO after midnight  -added type and cross  -medically optimized for surgery   -IV LR while NPO  -bladder scan PRN due to void. Consider russo if requiring mult straight cath   -analgesia PRN  -dvt ppx with PCD for now   -bedrest, therapy and SW consults post op when appropriate for dispo planning. Anticipate TCU.     Subacute L2 Compression Fracture   On imaging noted age indeterminate  superior endplate L2 compression fracture with 25% vertebral body height loss on CT Lumbar spine. Currently main area of pain right hip and lower extremity rather than back.  NCHCT, CT C  spine negative.   History of previous back pain treated supportively, had imaging that was negative at outside orthopedics per family.   - monitor for back pain  - analgesia PRN     CKD 3  Lab work with borderline mild hyponatremia, Cr 1.43 with GFR of 34. Last Cr 1.77 near baseline of ~1.6-1.8 when checked 2 years ago   -IV fluids while npo  -avoid nsaids for pain     Lack of dentition  -soft diet, aspiration precautions     Former tobacco dependence - no formal diagnosis of COPD but suspected in previous hospitalizations. No hypoxia or respiratory symptoms.     Alzheimer's disease: on aricept and zolofr. Pt still lives independently with rotating supervision from family.     Hypothyroidism:  Resumed synthroid     HLD:  Resumed zocor     Diet:  soft until midnight     DVT Prophylaxis: Pneumatic Compression Devices  Garza Catheter: Not present    Cardiac Monitoring: None    Code Status:  DNR DNI as discussed with three daughters all at bedside.     Clinically Significant Risk Factors Present on Admission                                  Disposition Plan      Expected Discharge Date: 12/13/2023,  9:00 AM      Discharge Comments: to WANG Felder PA-C    ______________________________________________________________________    Chief Complaint   Hip pain    History is obtained from the patient's 3 daughters who are at bedside.     History of Present Illness   Mel Lazaro is a 89 year old female who was brought to the ED for hip pain after a fall.     Ms. Lazaro resides in her home independently at baseline. She has assistance from her daughter's who take shifts caring for her in the home, when they are not there they have cameras to various points in the home for monitoring.    The afternoon of 12/9 patient's daughter left who was watching her on the afternoon shift.  Sometime after 1400 the patient was not visualized on the cameras between the kitchen in the bedroom.  Around 1600 when next daughter  arrived for her shift to help her with dinner and bedtime noticed that she was scooted up on the floor in an unusual position.  She was awake and alert but did complain of right hip pain and they needed an additional family members assistance to transfer her off the floor into a wheelchair.  She was not thought to be ill prior to the fall and no other new symptoms.    Family were able to assist the patient into bed where she was doing okay they report that she slept well overnight but in the morning was having a lot of pain and difficulty getting out of bed.  They brought her to the ED for evaluation.    At baseline the patient lives in her home independently does not use any walker or assistive device for ambulation.  He denies any history of concerns after anesthesia they are unsure if she has even had any previous surgeries.  She does not take any medications at home.    In the ED VSS. Given 1 tablet of norco for pain.   Lab work with borderline mild hyponatremia, Cr actually improved from prior values over the last 2 years. CBC with normocytic anemia hgb of 11.6. EKG with NSR, no acute ST elevations.    Imaging demonstrated multiple pelvic fractures - Bony irregularity at the right inferior pubic ramus is concerning for nondisplaced   right inferior pubic ramus fracture with likely nondisplaced fracture at the right puboacetabular junction superiorly near the lateral right superior pubic ramus    There is questionably a lucency in the subcapsular area of the hip on the right, on-call orthopedics who will be consulting and recommended CT scan at this point.    Age indeterminate  superior endplate L2 compression fracture with 25% vertebral body height loss on CT Lumbar spine.  NCHCT, CT C spine negative.     Admission was requested from hospitalist service for pain management and possible TCU placement with Orthopedic surgery consultation.     On my assessment previous complaints of right hip pain improved after  norco in ed. Due to void.   Family concerned she is dehydrated as she was in pain today not eating or drinking.     Past Medical History    Past Medical History:   Diagnosis Date    Anxiety     Hypertension        Past Surgical History   No past surgical history on file.    Prior to Admission Medications   Prior to Admission Medications   Prescriptions Last Dose Informant Patient Reported? Taking?   MELATONIN PO Unknown at PRN  Yes Yes   Sig: Take 1 tablet by mouth nightly as needed for sleep   senna-docusate (SENOKOT-S/PERICOLACE) 8.6-50 MG tablet Unknown at PRN  Yes Yes   Sig: Take 1 tablet by mouth daily as needed for constipation      Facility-Administered Medications: None        Review of Systems    The 10 point Review of Systems is negative other than noted in the HPI or here.     Social History   I have reviewed this patient's social history and updated it with pertinent information if needed.  Social History     Tobacco Use    Smoking status: Every Day     Packs/day: 1.00     Years: 70.00     Additional pack years: 0.00     Total pack years: 70.00     Types: Cigarettes        Physical Exam   Vital Signs: Temp: 98  F (36.7  C) Temp src: Temporal BP: 121/63 Pulse: 71   Resp: 24 SpO2: 96 % O2 Device: None (Room air)    Weight: 0 lbs 0 oz    Constitutional: Awakens to voice, drowsy after pain medication. Alert in no distress.   Eyes: Conjunctiva and pupils examined and normal.  HEENT: Dry mucous membranes, normal dentition.  Respiratory: Clear to auscultation bilaterally, no crackles or wheezing.  Cardiovascular: Regular rate and rhythm, normal S1 and S2, and no murmur noted.  GI: Soft, non-distended, non-tender, bowel sounds present. No rebound tenderness or guarding.  Lymph/Hematologic: No anterior cervical or supraclavicular adenopathy.  Skin: No rashes, no cyanosis, no edema.  Musculoskeletal: No gross deformities noted. Tender right hip with any movement of RLE. Able to wiggle toes bilaterally. No pain  with ROM in upper extremities.   Neurologic: Oriented to self, family members, knows she is in the hospital.  No focal deficits noted. Speech is clear. Coordination and strength grossly normal.   Psychiatric: Appropriate affect.       Medical Decision Making       75 MINUTES SPENT BY ME on the date of service doing chart review, history, exam, documentation & further activities per the note.      Data   ------------------------- PAST 24 HR DATA REVIEWED -----------------------------------------------     no

## 2023-12-10 NOTE — ED PROVIDER NOTES
History     Chief Complaint:  Hip Pain       HPI   Mel Lazaro is a 89 year old female who presents to the ED for evaluation of a fall.  She lives alone but has 3 adult daughters who check on her multiple times per day.  There is a video camera in the residence.  Reportedly she fell in the hallway where she was observed on the camera and was found this morning by her daughter.  She complains of pain of the right hip.  The patient is otherwise generally healthy.  She denies any syncope.  She denies headache.  She does not use any medications other than a stool softener from time to time.      Independent Historian:    History taken from EMS as well as the patient's daughter.    Review of External Notes:  Office visit reviewed from April 15, 2021 and the patient was seen and managed for hypertension and hypothyroidism.    Medications:    MELATONIN PO  senna-docusate (SENOKOT-S/PERICOLACE) 8.6-50 MG tablet    These medications are reported in the record but the patient's daughter says she is not currently taking anything.    Past Medical History:    Past Medical History:   Diagnosis Date    Anxiety     Hypertension        Past Surgical History:    No past surgical history on file.       Physical Exam   Patient Vitals for the past 24 hrs:   BP Temp Temp src Pulse Resp SpO2   12/10/23 1217 121/63 98  F (36.7  C) Temporal 71 24 96 %        Physical Exam  Constitutional:       General: She is not in acute distress.     Appearance: Normal appearance. She is not diaphoretic.   HENT:      Head: Atraumatic.      Right Ear: External ear normal.      Left Ear: External ear normal.      Mouth/Throat:      Mouth: Mucous membranes are moist.   Eyes:      General: No scleral icterus.     Conjunctiva/sclera: Conjunctivae normal.   Cardiovascular:      Rate and Rhythm: Normal rate and regular rhythm.      Heart sounds: Normal heart sounds.   Pulmonary:      Effort: No respiratory distress.      Breath sounds: Normal breath  sounds.   Abdominal:      General: Abdomen is flat. There is no distension.      Tenderness: There is no abdominal tenderness.   Musculoskeletal:      Cervical back: Neck supple.      Comments: No step-off cervical, thoracic, or lumbar spine.  There is tenderness with limited range of motion of the right hip.   Skin:     General: Skin is warm.      Findings: No rash.   Neurological:      General: No focal deficit present.      Mental Status: She is alert.      Comments: Strength and sensation to the extremities is normal.   Psychiatric:         Mood and Affect: Mood normal.         Behavior: Behavior normal.           Emergency Department Course   ECG  ECG results from 12/10/23   EKG 12-lead, tracing only     Value    Systolic Blood Pressure     Diastolic Blood Pressure     Ventricular Rate 68    Atrial Rate 68    TN Interval 192    QRS Duration 68        QTc 433    P Axis 36    R AXIS 86    T Axis 90    Interpretation ECG      Sinus rhythm  Nonspecific ST and T wave abnormality  Abnormal ECG  When compared with ECG of 30-MAY-2017 19:27,  Nonspecific T wave abnormality now evident in Inferior leads        Imaging:  XR Pelvis and Hip Right 2 Views   Final Result   IMPRESSION: Anatomic alignment right hip. Subtle lucency at the subcapital region of the right femoral neck could represent summation artifact or nondisplaced fracture. Bony irregularity at the right inferior pubic ramus is concerning for nondisplaced    right inferior pubic ramus fracture with likely nondisplaced fracture at the right puboacetabular junction superiorly near the lateral right superior pubic ramus. Mild bilateral hip osteoarthritis with chondrocalcinosis. Diffuse bone demineralization.    Degenerative change lower lumbar spine and both SI joints.      Right hip MRI recommended (assuming no MRI contraindication).      NOTE: ABNORMAL REPORT      THE DICTATION ABOVE DESCRIBES AN ABNORMALITY FOR WHICH FOLLOW-UP IS NEEDED.              Lumbar spine CT w/o contrast   Final Result   IMPRESSION:     1.  Age-indeterminate superior endplate L2 compression fracture with 25% vertebral body height loss.   2.  No other evidence of acute fracture or subluxation of the lumbar spine by CT imaging.   3.  Degenerative lumbar spondylosis with level by level analysis as described above.      CT Cervical Spine w/o Contrast   Final Result   IMPRESSION:   1.  No evidence of acute fracture or subluxation of the cervical spine by CT imaging.   2.  Degenerative cervical spondylosis as described above.      CT Head w/o Contrast   Final Result   IMPRESSION:     1.  No evidence of acute intracranial hemorrhage or mass effect.   2.  Moderate nonspecific white matter changes.   3.  Moderate brain parenchymal volume loss.      CT Hip Right w/o Contrast    (Results Pending)     Report per radiology    Laboratory:  Labs Ordered and Resulted from Time of ED Arrival to Time of ED Departure   BASIC METABOLIC PANEL - Abnormal       Result Value    Sodium 134 (*)     Potassium 4.0      Chloride 99      Carbon Dioxide (CO2) 27      Anion Gap 8      Urea Nitrogen 25.8 (*)     Creatinine 1.43 (*)     GFR Estimate 34 (*)     Calcium 8.5      Glucose 119 (*)    CBC WITH PLATELETS AND DIFFERENTIAL - Abnormal    WBC Count 10.4      RBC Count 3.86      Hemoglobin 11.6 (*)     Hematocrit 36.1      MCV 94      MCH 30.1      MCHC 32.1      RDW 13.4      Platelet Count 257      % Neutrophils 85      % Lymphocytes 8      % Monocytes 7      % Eosinophils 0      % Basophils 0      % Immature Granulocytes 0      NRBCs per 100 WBC 0      Absolute Neutrophils 8.7 (*)     Absolute Lymphocytes 0.8      Absolute Monocytes 0.7      Absolute Eosinophils 0.0      Absolute Basophils 0.0      Absolute Immature Granulocytes 0.0      Absolute NRBCs 0.0     ROUTINE UA WITH MICROSCOPIC REFLEX TO CULTURE        Emergency Department Course & Assessments:       [facility] orthopedics  consulted.      Interventions:  Medications   HYDROcodone-acetaminophen (NORCO) 5-325 MG per tablet 1 tablet (has no administration in time range)        Social Determinants of Health affecting care:  The patient lives alone     Disposition:  The patient was admitted to the hospital under the care of Dr. Hernandez.     Impression & Plan      Medical Decision Making:  This patient presents to the ED following a fall at home.  She has multiple pelvic fractures.  There is questionably a lucency in the subcapsular area of the hip on the right.  I spoke with on-call orthopedics who will be consulting and recommended CT scan at this point.  The patient will be admitted to the hospital service for pain management and possible TCU placement.        Diagnosis:    ICD-10-CM    1. Multiple closed fractures of pelvis without disruption of pelvic ring, initial encounter (H)  S32.82XA       2. Fall, initial encounter  W19.XXXA          12/10/2023   Alfred Gutierrez MD McRoberts, Sean Edward, MD  12/10/23 1443

## 2023-12-10 NOTE — LETTER
CHARLES Mitchell, [facility]  Inpatient Care Coordination  [facility]  [phone]

## 2023-12-10 NOTE — LETTER
Patient accepted into [facility] in [place]. They recommended your agency for this patient.  Planning on transferring patient Monday.      CHARLES Mitchell, [facility]  Inpatient Care Coordination    [phone]

## 2023-12-10 NOTE — LETTER
Final orders coming shortly.    CHARLES Mitchell, [facility]  Inpatient Care Coordination  [facility]  [phone]

## 2023-12-10 NOTE — ED TRIAGE NOTES
Fall yesterday. Right hip and back pain. Not on thinners. History of dementia. Fall was not witnessed. Grunting with each expiration; stops when talking.

## 2023-12-11 NOTE — ANESTHESIA CARE TRANSFER NOTE
Patient: Mel Lazaro    Procedure: Procedure(s):  Surgical management of femoral neck fracture       Diagnosis: Closed fracture of neck of right femur, initial encounter (H) [S72.001A]  Diagnosis Additional Information: No value filed.    Anesthesia Type:   General     Note:    Oropharynx: oropharynx clear of all foreign objects  Level of Consciousness: drowsy  Oxygen Supplementation: face mask  Level of Supplemental Oxygen (L/min / FiO2): 5  Independent Airway: airway patency satisfactory and stable  Dentition: dentition unchanged  Vital Signs Stable: post-procedure vital signs reviewed and stable  Report to RN Given: handoff report given  Patient transferred to: PACU    Handoff Report: Identifed the Patient, Identified the Reponsible Provider, Reviewed the pertinent medical history, Discussed the surgical course, Reviewed Intra-OP anesthesia mangement and issues during anesthesia, Set expectations for post-procedure period and Allowed opportunity for questions and acknowledgement of understanding    Vitals:  Vitals Value Taken Time   /58 12/11/23 0850   Temp     Pulse 89 12/11/23 0853   Resp 20 12/11/23 0853   SpO2 89 % 12/11/23 0853   Vitals shown include unfiled device data.    Electronically Signed By: Dean Dennis Severson, APRN CRNA  December 11, 2023  8:55 AM

## 2023-12-11 NOTE — PROGRESS NOTES
Patient vital signs are at baseline: No,  Reason:  On 3L O2, not baseline   Patient able to ambulate as they were prior to admission or with assist devices provided by therapies during their stay:  No,  Reason:  Not OOB  Patient MUST void prior to discharge:  No,  Reason:  DTV  Patient able to tolerate oral intake:  Yes, chicken broth, ice water tolerated  Pain has adequate pain control using Oral analgesics:  Yes      Disoriented x4. Dementia. Pt removed IV, and repeatedly removing oxygen. Redirected and reoriented. New IV will need to be placed. Requested sitter for pt. SW consult placed. DTV.

## 2023-12-11 NOTE — ANESTHESIA PROCEDURE NOTES
Airway       Patient location during procedure: OR       Procedure Start/Stop Times: 12/11/2023 7:36 AM  Staff -        Anesthesiologist:  Hailey Pacheco MD       CRNA: Severson, Dean Dennis, APRN CRNA       Performed By: CRNA  Consent for Airway        Urgency: elective  Indications and Patient Condition       Indications for airway management: cici-procedural and airway protection       Induction type:intravenous       Mask difficulty assessment: 1 - vent by mask    Final Airway Details       Final airway type: endotracheal airway       Successful airway: ETT - single  Endotracheal Airway Details        ETT size (mm): 7.0       Cuffed: yes       Successful intubation technique: direct laryngoscopy       DL Blade Type: Edge 2       Grade View of Cords: 1       Adjucts: stylet       Position: Center       Measured from: lips       Secured at (cm): 22       Bite block used: Soft    Post intubation assessment        Placement verified by: capnometry, equal breath sounds and chest rise        Number of attempts at approach: 1       Number of other approaches attempted: 0       Secured with: tape       Ease of procedure: easy       Dentition: Intact and Unchanged    Medication(s) Administered   Medication Administration Time: 12/11/2023 7:36 AM

## 2023-12-11 NOTE — ADDENDUM NOTE
Addendum  created 12/11/23 1016 by Severson, Dean Dennis, APRN CRNA    Intraprocedure Meds edited

## 2023-12-11 NOTE — PLAN OF CARE
[facility]    ED Boarding Nurse Handoff Addendum Report:    Date/time: 12/10/2023, 6:38 PM    Activity Level: in bed    Fall Risk: Yes:  nonskid shoes/slippers when out of bed, arm band in place, and patient and family education    Active Infusions: LR - 150 Ml/ Hr    Current Meds Due: no    Current care needs: Pain management, bladder scan    Oxygen requirements (liters/min and/or FiO2): no      Respiratory status: Room air    Vital signs (within last 30 minutes):    Vitals:    12/10/23 1217   BP: 121/63   Pulse: 71   Resp: 24   Temp: 98  F (36.7  C)   TempSrc: Temporal   SpO2: 96%       Focused assessment within last 30 minutes:        ED Boarding Nurse name: Zoë Babin RN          Pt A&O to self. Pt unable to void, straight cath. UA sent to lab. Pt NPO at midnight. Bedrest. Offered soft mech diet, pt refused. Will need new purewick.

## 2023-12-11 NOTE — PLAN OF CARE
Goal Outcome Evaluation:    Patient is  alert to self only . Confused. Pt is on bedrest for R hip fx.  Pt is a NPO diet.  C/o pain on R hip with movement. Tylenol given.  IVF infusing at 75 mL/hr. On aspiration precautions. Meds crushed in apple sauce. Ortho consult. Surgical bed bath was completed & linens changed. Bladder scanned for 337 and St cath'd for 500 mL. Off to surgery at 0635.

## 2023-12-11 NOTE — PROGRESS NOTES
Notified MD at 09 AM regarding  wheezing, O2 SATs 99 on 6 L face mask .      Spoke with: Dr Pacheco    Orders were obtained.    Comments: Duoneb given. Wheezing improved. Pt 97% on RA. Dr Pacheco aware.

## 2023-12-11 NOTE — ANESTHESIA PREPROCEDURE EVALUATION
Anesthesia Pre-Procedure Evaluation    Patient: Mel Lazaro   MRN: [id number] : 11/3/1929        Procedure : Procedure(s):  Surgical management of femoral neck fracture          Past Medical History:   Diagnosis Date    Anxiety     Hypertension       History reviewed. No pertinent surgical history.   No Known Allergies   Social History     Tobacco Use    Smoking status: Every Day     Packs/day: 1.00     Years: 70.00     Additional pack years: 0.00     Total pack years: 70.00     Types: Cigarettes    Smokeless tobacco: Not on file   Substance Use Topics    Alcohol use: Not on file      Wt Readings from Last 1 Encounters:   17 58.4 kg (128 lb 12.8 oz)        Anesthesia Evaluation            ROS/MED HX  ENT/Pulmonary:  - neg pulmonary ROS     Neurologic:     (+)   dementia,                             Cardiovascular:     (+)  hypertension- -   -  - -                                      METS/Exercise Tolerance: >4 METS    Hematologic:  - neg hematologic  ROS     Musculoskeletal:   (+)     fracture, Fracture location: RLE,         GI/Hepatic:  - neg GI/hepatic ROS     Renal/Genitourinary:     (+) renal disease, type: CRI,            Endo:  - neg endo ROS     Psychiatric/Substance Use:  - neg psychiatric ROS     Infectious Disease:  - neg infectious disease ROS     Malignancy:  - neg malignancy ROS     Other:  - neg other ROS          Physical Exam    Airway        Mallampati: II   TM distance: > 3 FB   Neck ROM: full   Mouth opening: > 3 cm    Respiratory Devices and Support         Dental       (+) Edentulous      Cardiovascular   cardiovascular exam normal       Rhythm and rate: regular and normal     Pulmonary   pulmonary exam normal        breath sounds clear to auscultation           OUTSIDE LABS:  CBC:   Lab Results   Component Value Date    WBC 10.4 12/10/2023    WBC 19.0 (H) 2017    HGB 11.6 (L) 12/10/2023    HGB 10.6 (L) 2017    HCT 36.1 12/10/2023    HCT 33.4 (L) 2017      "12/10/2023     06/06/2017     BMP:   Lab Results   Component Value Date     (L) 12/10/2023     06/06/2017    POTASSIUM 4.0 12/10/2023    POTASSIUM 3.6 06/06/2017    CHLORIDE 99 12/10/2023    CHLORIDE 100 06/06/2017    CO2 27 12/10/2023    CO2 34 (H) 06/06/2017    BUN 25.8 (H) 12/10/2023    BUN 27 06/06/2017    CR 1.43 (H) 12/10/2023    CR 1.00 06/06/2017     (H) 12/10/2023    GLC 73 06/06/2017     COAGS: No results found for: \"PTT\", \"INR\", \"FIBR\"  POC: No results found for: \"BGM\", \"HCG\", \"HCGS\"  HEPATIC: No results found for: \"ALBUMIN\", \"PROTTOTAL\", \"ALT\", \"AST\", \"GGT\", \"ALKPHOS\", \"BILITOTAL\", \"BILIDIRECT\", \"EVAN\"  OTHER:   Lab Results   Component Value Date    LACT 1.8 06/04/2017    JOE 8.5 12/10/2023    CRP 89.5 (H) 06/02/2017    SED 76 (H) 05/31/2017       Anesthesia Plan    ASA Status:  3    NPO Status:  NPO Appropriate    Anesthesia Type: General.     - Airway: ETT   Induction: Intravenous.   Maintenance: Inhalation.        Consents    Anesthesia Plan(s) and associated risks, benefits, and realistic alternatives discussed. Questions answered and patient/representative(s) expressed understanding.     - Discussed: Risks, Benefits and Alternatives for the PROCEDURE were discussed     - Discussed with:  Patient, Healthcare Power of , Other (See Comment)       - Patient is DNR/DNI Status: Yes             Suspend during perioperative period? Yes.    Use of blood products discussed: Yes.     - Discussed with: Patient, Healthcare Power of , Other (see comment).     - Consented: consented to blood products            Reason for refusal: other.     Postoperative Care    Pain management: IV analgesics, Oral pain medications.   PONV prophylaxis: Ondansetron (or other 5HT-3), Dexamethasone or Solumedrol     Comments:    Other Comments: Spoke with the patient's daughter on the phone. DNI suspended for procedure. DNR remains intact, no compressions or chemical " resuscitation.    GETA  Avoid ketorolac given CKD. Dilaudid PRN.             Hailey Pacheco MD    I have reviewed the pertinent notes and labs in the chart from the past 30 days and (re)examined the patient.  Any updates or changes from those notes are reflected in this note.     # Hyponatremia: Lowest Na = 134 mmol/L in last 30 days, will monitor as appropriate

## 2023-12-11 NOTE — OP NOTE
OPERATIVE NOTE    Name: Mel Lazaro  MRN: [id number]  Age: 89 year old    YOB: 1929    Date of Procedure: 12/11/2023      Pre-operative Diagnosis:   Right nondisplaced subcapital femoral neck fracture    Post-operative Diagnosis:   Right nondisplaced subcapital femoral neck fracture    Procedure:    Closed reduction and internal fixation of right femoral neck fracture    Assistant:  None    Anesthesia:  1. General    Complications:  None    Estimated blood loss:   50cc     Transfusions:  None    Fluids:  Per anesthesia    Specimens:  None    Drain:  None      Indications:   The patient is a very pleasant 89 year old female with a history of dementia who unfortunately fell at home on December 9, 2023.  She noticed immediate onset of pain in the right hip and was unable to bear weight after the fall.  She was brought to the emergency department December 10, 2023 where radiographs demonstrated a right nondisplaced femoral neck fracture.  After discussing the risk and benefits of both operative and nonoperative management with the patient's family, the family elected to proceed with surgery in order to stabilize the fracture, allow the patient to bear weight as tolerated, and reduce the risk displacement of the fracture.    Informed Consent:  Preoperatively, the risks, benefits, and possible complications of the procedure were discussed. The risks discussed included but were not limited to hardware failure, wound healing complications, infection, nonunion, malunion, persistent pain, myocardial infarction, medical complications related to anesthesia, and death.       Components:  Synthes femoral neck system 1-hole plate      Procedural Pause:   The patient's correct identity, side, site, and procedure to be performed was verified.  Intravenous antibiotics were administered prior to skin incision.    Description of Procedure:   Mel Lazaro was brought to the operating room.  General anesthesia was  utilized.  The patient was transferred to the fracture table.  All bony prominences were well-padded.  Prior to prepping the patient, we obtained AP and lateral images to ensure we are able to obtain adequate images prior to starting.  The fracture was nondisplaced.  The patient was prepped and draped in routine sterile fashion and a procedural pause was performed as described above.  An incision was made over the lateral aspect of the femur just distal to the vastus ridge.  Dissection was carried down to the fascia which was split in line with the skin incision. The guidewire was introduced and confirmed to be in the center of the femoral head on both the AP and lateral images.  We then measured for the bolt.  The triple reamer was then utilized for the bolt.  The bolt and 1 hole plate were then introduced.  Biplanar fluoroscopy was utilized to confirm the position of the plate on bone distally the position of the both within the femoral neck and head.  Satisfied with the position of the hardware, we then drilled for the distal interlocking screw.  The distal locking screw was placed.  We then drilled for the antirotation screw which was the same length as the bolt.  We then placed the antirotation screw.  We obtained final AP and lateral images to make sure the fracture remained nondisplaced and we were satisfied with the position of the hardware.  We then proceeded to closure.  The wound was irrigated with normal saline.  The fascia was closed with 0 Vicryl suture.  The cutaneous layer was then closed with 2-0 Vicryl.  The skin was closed with staples.  A sterile Aquacel dressing was placed.  All counts were correct.  The patient woke up from anesthesia without issue.  The patient was transferred to the PACU and then will return to the inpatient floor.      Post-operative Plan:  Activity: Weightbearing as tolerated  Antibiotics: Weight-based Ancef x2 doses   DVT: Aspirin 81mg BID  Wound: Aquacel for 7 days    Disposition: Inpatient      Santo Deng MD  [facility]  Phone: [phone]  Fax: [phone]

## 2023-12-11 NOTE — ANESTHESIA POSTPROCEDURE EVALUATION
Patient: Mel Lazaro    Procedure: Procedure(s):  Surgical management of femoral neck fracture       Anesthesia Type:  General    Note:  Disposition: Inpatient   Postop Pain Control: Uneventful            Sign Out: Well controlled pain   PONV: No   Neuro/Psych: Uneventful            Sign Out: Acceptable/Baseline neuro status   Airway/Respiratory: Uneventful            Sign Out: Acceptable/Baseline resp. status   CV/Hemodynamics: Uneventful            Sign Out: Acceptable CV status; No obvious hypovolemia; No obvious fluid overload   Other NRE: NONE   DID A NON-ROUTINE EVENT OCCUR? No    Event details/Postop Comments:  Duoneb given in PACU for mild wheezing. Satting well.           Last vitals:  Vitals Value Taken Time   /54 12/11/23 0900   Temp 97.9  F (36.6  C) 12/11/23 0850   Pulse 88 12/11/23 0905   Resp 14 12/11/23 0905   SpO2 99 % 12/11/23 0905   Vitals shown include unfiled device data.    Electronically Signed By: Hailey Pacheco MD  December 11, 2023  9:07 AM

## 2023-12-12 NOTE — PLAN OF CARE
Patient vital signs are at baseline: Yes  Patient able to ambulate as they were prior to admission or with assist devices provided by therapies during their stay:  No,  Reason: pt not oob at night   Patient MUST void prior to discharge:  Yes small amounts; bladders scanned for a small amounts   Patient able to tolerate oral intake:  Yes on regular diet; takes PO meds crushed with apple sauce slowly  Pain has adequate pain control using Oral analgesics:  Yes no signs/symptoms of pain; denied paid; though, received scheduled Tylenol   Does patient have an identified :  Yes daughters  Has goal D/C date and time been discussed with patient:  No,  Reason:  TBD    Pt is confused. Oriented at night. Dressing CDI. CMS FRANCOIS. VSS. On RA the majority of night. Sleeps between cares.

## 2023-12-12 NOTE — PLAN OF CARE
Goal Outcome Evaluation:      Plan of Care Reviewed With: child    Overall Patient Progress: no changeOverall Patient Progress: no change    3pm-11pm RN    Patient VS are at baseline: No is on 2L of O2 per NC  Patient able to ambulate as they were prior to admission or with assist devices provided by therapy during their stay:  Patient must void prior to discharge: No only able to sit at the edge of the bed unable to stand up  Patient able to tolerate oral intake: Yes but poor appetite  Patient has adequate pain control using oral analgesics: Yes    Patient disoriented X4, new IV placed this evening so abx was given late because she had no IV access. Dressing of R hip CDI, unable to assess CMS because patient is confused. Dangled and sat at bedside with extensive assist of 2.Tylenol used for pain management, bladder scan 45 this shift, is due to void. PT yet to see patient.

## 2023-12-12 NOTE — PLAN OF CARE
"Goal Outcome Evaluation: POD 1 R hip ORIF  Pt is confused at baseline.  VSS.  Refused turning, resisted.  Gave scheduled tylenol.  Bladder scan 155, 135ml. Follows commands with multiple requests, +dorsi plantar, +pedal pulses.  Daughter at bedside, reports, this is her baseline neuro state. Oriented to first name only,  stated \"Naveen\" her maiden name when asked last name,  Did not respond appropriately to other questions. Pupils are WDL.  All meds crushed with pudding/applesauce.                        "

## 2023-12-12 NOTE — CONSULTS
Care Management Initial Consult    General Information  Assessment completed with: Children, VM-chart reviewCarlita-dtr  Type of CM/SW Visit: Initial Assessment    Primary Care Provider verified and updated as needed:  (doesn't have one)   Readmission within the last 30 days: no previous admission in last 30 days      Reason for Consult: discharge planning  Advance Care Planning:            Communication Assessment  Patient's communication style: spoken language (English or Bilingual)    Hearing Difficulty or Deaf: yes   Wear Glasses or Blind: yes    Cognitive  Cognitive/Neuro/Behavioral: .WDL except, orientation, motor response, mood/behavior, level of consciousness, arousability  Level of Consciousness: lethargic, alert  Arousal Level: arouses to voice  Orientation: disoriented x 4 (gave first name only, then maiden name, no )  Mood/Behavior: calm, cooperative  Best Language: 1 - Mild to moderate  Speech: spontaneous, garbled    Living Environment:   People in home: alone     Current living Arrangements: house      Able to return to prior arrangements: no       Family/Social Support:  Care provided by: child(lilliam)  Provides care for: no one  Marital Status:   Children          Description of Support System: Supportive, Involved         Current Resources:   Patient receiving home care services: No     Community Resources: None  Equipment currently used at home: none  Supplies currently used at home: None    Employment/Financial:  Employment Status:          Financial Concerns:             Does the patient's insurance plan have a 3 day qualifying hospital stay waiver?  No    Lifestyle & Psychosocial Needs:  Social Determinants of Health     Food Insecurity: Not on file   Depression: Not on file   Housing Stability: Not on file   Tobacco Use: High Risk (2023)    Patient History     Smoking Tobacco Use: Every Day     Smokeless Tobacco Use: Unknown     Passive Exposure: Not on file   Financial Resource  Strain: Not on file   Alcohol Use: Not on file   Transportation Needs: Not on file   Physical Activity: Not on file   Interpersonal Safety: Not on file   Stress: Not on file   Social Connections: Not on file       Functional Status:  Prior to admission patient needed assistance:   Dependent ADLs:: Ambulation-no assistive device, Bathing, Grooming, Dressing  Dependent IADLs:: Cleaning, Cooking, Laundry, Shopping, Meal Preparation, Medication Management, Money Management, Transportation       Mental Health Status:  Mental Health Status: No Current Concerns       Chemical Dependency Status:  Chemical Dependency Status: No Current Concerns             Values/Beliefs:  Spiritual, Cultural Beliefs, Yazidism Practices, Values that affect care:                 Additional Information:  SW consulted for discharge planning. Per H&P, pt has Pmhx of CKD III, former tobacco dependence with possible COPD, dementia, HTN, hypothyroidism, who was admitted on 12/10/2023 after an unwitnessed fall presented with right hip pain. Found to have right femoral neck, acetabular and pubic rami fractures.      SW reviewed chart and met with daughter, Carlita at bedside, pt was sleeping. Daughter shared and confirmed the following information. Pt lives in her house with her three daughter caring for her daily.  Daughters all have a role in pt's care.  Tanya is in charge of medical, Carlita in charge of home and yard/garden, Hemalatha is POA and attends to bills and groceries. They assist with all meals, bathing, dressing, laundry, and cleaning. They have a cameras in the home and a daughter is 7 minutes from pt's home. Pt is independent with mobility, eating, and toileting.      Carlita does not recall pt having a PCP, she hasn't been sick for a very long time and only takes melatonin and stool softer.      Family is thinking pt will now need a higher level of care after TCU and are have ideas of where pt will reside.  Daughter would like resources for  senior housing; SW provided information on A Place for Mom, Senior Linkage Line, [employer], and [employer].    Daughter requested TCU referrals be sent to [facility], then [facility], [facility], and [facility], sent. Semi-private room.    Transportation, to be determined.    CHARLES Mitchell, [facility]  Inpatient Care Coordination  [facility]  [phone]

## 2023-12-12 NOTE — PROGRESS NOTES
[facility]    Medicine Progress Note - Hospitalist Service    Date of Admission:  12/10/2023    Assessment & Plan   Mel Lazaro is a 89 year old female with Pmhx of CKD III, former tobacco dependence with possible COPD, dementia, HTN, hypothyroidism, who was admitted on 12/10/2023 after an unwitnessed fall presented with right hip pain. Found to have right femoral neck, acetabular and pubic rami fractures. Admitted for further cares with Orthopedic surgery consultation.         Fall, unwitnessed   Right femoral neck s/p ORIF 12/11  Acetabular and pubic rami fractures  Right Hip Effusion   Fell afternoon of 12/9, unwitnessed. Independent with ambulation at baseline. No preceding illness or known presyncopal symptoms prior to fall. She has assistance from her daughter's who take shifts caring for her in the home, when they are not there they have cameras to various points in the home for monitoring.  Brought in for right hip pain, difficulty mobilizing. Found to have right femoral neck, acetabular and right pubic rami fractures. Admitted for further cares with Orthopedic surgery consultation.   No hx of previous adverse issues with anesthesia, no known cardiopulmonary disease.    -Inpatient  -lower suspicion for rhabdo given was not down unassisted for long, added UA and CK for completeness   -Ortho consulted, surgery completed   -Diet as tolerated  -analgesia PRN  -bedrest, therapy and SW consults post op when appropriate for dispo planning. Anticipate TCU.      Subacute L2 Compression Fracture   On imaging noted age indeterminate  superior endplate L2 compression fracture with 25% vertebral body height loss on CT Lumbar spine. Currently main area of pain right hip and lower extremity rather than back.  NCHCT, CT C spine negative.   History of previous back pain treated supportively, had imaging that was negative at outside orthopedics per family.   - monitor for back pain  - analgesia PRN       CKD 3  Lab work with borderline mild hyponatremia, Cr 1.43 with GFR of 34. Last Cr 1.77 near baseline of ~1.6-1.8 when checked 2 years ago   Creatinine   Date Value Ref Range Status   12/11/2023 1.53 (H) 0.51 - 0.95 mg/dL Final   06/06/2017 1.00 0.52 - 1.04 mg/dL Final       -Encourage oral intake  -avoid nsaids for pain      Lack of dentition  -soft diet, aspiration precautions      Former tobacco dependence - no formal diagnosis of COPD but suspected in previous hospitalizations. No hypoxia or respiratory symptoms.      Alzheimer's disease: on aricept and zolofr. Pt still lives independently with rotating supervision from family.      Hypothyroidism:  Resumed synthroid     HLD:  Resumed zocor          Diet: Regular Diet Adult    DVT Prophylaxis: Defer to primary service  Garza Catheter: Not present  Lines: None     Cardiac Monitoring: None  Code Status: No CPR- Do NOT Intubate      Clinically Significant Risk Factors                                    Disposition Plan      Expected Discharge Date: 12/13/2023,  9:00 AM      Discharge Comments: to U            Rey Desai MD  Hospitalist Service  [facility]  Securely message with [employer] (more info)  Text page via [employer] Paging/Directory   ______________________________________________________________________    Interval History   Underwent surgical intervention without complication in the perioperative time.  In the surgical unit, she has been resting in bed, not in severe pain.  No nausea, vomiting, chest pain or shortness of breath.  Capnography with parameters within the normal limits.  Daughter is accompanying in the room.  Patient is demented.    Physical Exam   Vital Signs: Temp: 97.3  F (36.3  C) Temp src: Temporal BP: 135/55 Pulse: 73   Resp: 17 SpO2: 99 % O2 Device: Nasal cannula Oxygen Delivery: 3 LPM  Weight: 0 lbs 0 oz    GEN: [place],  Alert, cooperates, appears comfortable, NAD.  HEENT:  Normocephalic/atraumatic, no  scleral icterus, no nasal discharge, mouth moist.  CV:  Regular rate and rhythm, no murmur or JVD.  S1 + S2 noted, no S3 or S4.  LUNGS:  Clear to auscultation bilaterally without rales/rhonchi/wheezing/retractions.  Symmetric chest rise on inhalation noted.  ABD:  Active bowel sounds, soft, non-tender/non-distended.  No rebound/guarding/rigidity.  EXT:  No edema or cyanosis.  No joint synovitis noted.  SKIN:  Dry to touch, no exanthems noted in the visualized areas.         Medical Decision Making     26 MINUTES SPENT BY ME on the date of service doing chart review, history, exam, documentation & further activities per the note.      Data     I have personally reviewed the following data over the past 24 hrs:    N/A  \   N/A   / N/A     138 101 28.9 (H) /  110 (H)   4.1 26 1.53 (H) \

## 2023-12-12 NOTE — PROGRESS NOTES
Orthopedic Surgery  Mel Lazaro  12/12/2023     Admit Date:  12/10/2023    POD: 1 Day Post-Op   Procedure(s):  Closed reduction and internal fixation of right femoral neck fracture     Patient resting comfortably in bed.    Pain controlled at rest  Tolerating oral intake.    Denies nausea or vomiting  Denies chest pain or shortness of breath    Temp:  [97.3  F (36.3  C)-97.6  F (36.4  C)] 97.6  F (36.4  C)  Pulse:  [65-78] 67  Resp:  [16-17] 16  BP: (133-156)/(52-63) 156/52  SpO2:  [94 %-100 %] 97 %    Dementia at baseline.   Dressing is clean, dry, and intact.   Minimal erythema of the surrounding skin.   Bilateral calves are soft, non-tender.  Right lower extremity is NVI.  Sensation intact bilateral lower extremities  Patient able to resist dorsi and plantar flexion bilaterally  +Dp pulse    Labs:  Recent Labs   Lab Test 12/12/23  0705 12/10/23  1228 06/06/17  0716 06/05/17  0910   WBC  --  10.4 19.0* 20.8*   HGB 9.9* 11.6* 10.6* 11.5*   PLT  --  257 321 349     1. PLAN:   Continue ASA 81mg bid for DVT prophylaxis.     Mobilize with PT/OT    WBAT right LE with walker, no precautions.     Continue current pain regiment.   Dressings: Keep intact.  Change if >60% saturated or peeling off.    Follow-up: 2 weeks post-op with Dr Deng team    2. Disposition   Anticipate d/c likely to TCU when medically cleared and progressing in PT.    Roxanne Benitez PA-C

## 2023-12-13 NOTE — PLAN OF CARE
Goal Outcome Evaluation:      Plan of Care Reviewed With: patient    Overall Patient Progress: improvingOverall Patient Progress: improving    3pm-11pm RN    Patient VS are at baseline: Yes  Patient able to ambulate as they were prior to admission or with assist devices provided by therapy during their stay: No is a lift transfer  Patient must void prior to discharge: No is due to void  Patient able to tolerate oral intake: Yes but poor appetite  Patient has adequate pain control using oral analgesics: Yes    Patient due to void, was incontinent of bladder was bladder scanned for 250. Patient only oriented to self. Dressing CDI, to continue working with PT.

## 2023-12-13 NOTE — PLAN OF CARE
Goal Outcome Evaluation:                      Pt is confused. daughter  was at bedside, helped with feeding.  Pt is on RA. LS diminished. IV has protective splint on  ,  ml infusing per order. No c/o N/V.  Abdomen soft, BS active X4. Last BM was 12/10/23, pt is on scheduled Miralax and senna. Pt was moaning and PRN 2.5 mg Oxycodone given.did not void. Apatite very poor. R/hip  Incision CDI , covered with Aquacel dressing. PPP, good dorsiflex. pt is lift  with A-2.

## 2023-12-13 NOTE — PROGRESS NOTES
Care Management Follow Up    Length of Stay (days): 3    Expected Discharge Date: 12/14/2023     Concerns to be Addressed: discharge planning     Patient plan of care discussed at interdisciplinary rounds: Yes    Anticipated Discharge Disposition: Long Term Care     Anticipated Discharge Services:    Anticipated Discharge DME:      Patient/family educated on Medicare website which has current facility and service quality ratings: yes  Education Provided on the Discharge Plan:    Patient/Family in Agreement with the Plan: yes    Referrals Placed by CM/SW: Post Acute Facilities, Transportation    Additional Information:  SW met with pt, two daughters and PT today to discuss recommendations and daughters thoughts on cares.  Daughters are wanting pt to go to LTC vs TCU then LTC.  They want to discuss with other sister. They requested Sw follow up with [facility] can take pt LTC. SW did, they don't have beds for either. Sisters would like to talk to provider about prognosis and recommendations. SW requested provider meet with them, provider did. Provider supports LTC with rehab.     Sw followed up with other SNFs referrals were sent to yesterday to see if they have a LTC for pt.    Other daughter met with pt later in the day and wanted an update and to call sister Tanya to discuss today.  They inquired about hospice, SW briefly talked about it. They had more questions regarding pt's prognosis and information.  They would like to meet with palliative to learn more about goals of care, hospice and recommendations. All sisters want to meet with SW tomorrow at 10:30 to discuss pt's options.     Provider confirmed he will submit a consult for palliative.    SW spoke to palliative, they will meet with family and SW tomorrow at 10:30. Informed sister at bedside.    CHARLES Mitchell, [facility]  Inpatient Care Coordination  [facility]  [phone]

## 2023-12-13 NOTE — PLAN OF CARE
"OT: Orders received. Chart reviewed and discussed with care team.  IP OT not indicated as patient is expected to discharge to TCU. Per PT note \"Patient with limited mobility today, very poor command following and actively resisting mobility. Per chart, patient's daughters have been assisting but patient was ambulating IND. Patient currently unable to ambulate, needing maxAx1-2 for bed mobility. Greatest barrier to IP PT is patient's cognition/impaired command follow. If patient making improved progress in IP PT, consider TCU at discharge. Otherwise may be more appropriate for LTC where she would have great level of assistance available\". Per Sw note, family is interested in higher level of care upon discharge from TCU. Defer discharge recommendations to care team and OT to next level of care.  Will complete orders.    "

## 2023-12-13 NOTE — PROGRESS NOTES
Orthopedic Surgery  Mel Lazaro  12/13/2023     Admit Date:  12/10/2023    POD: 2 Days Post-Op   Procedure(s):  Closed reduction and internal fixation of right femoral neck fracture     Patient resting comfortably in chair.     Pain controlled at rest  Tolerating oral intake.    Denies nausea or vomiting  Denies chest pain or shortness of breath    Temp:  [97.1  F (36.2  C)-98.3  F (36.8  C)] 97.3  F (36.3  C)  Pulse:  [73-81] 73  Resp:  [16-18] 16  BP: (148-167)/(51-71) 167/66  SpO2:  [95 %-97 %] 96 %    Dementia at baseline.   Dressing is clean, dry, and intact.   Minimal erythema of the surrounding skin.   Bilateral calves are soft, non-tender.  Right lower extremity is NVI.  Sensation intact bilateral lower extremities  Patient able to resist dorsi and plantar flexion bilaterally  +Dp pulse    Labs:  Recent Labs   Lab Test 12/13/23  0646 12/12/23  0705 12/10/23  1228 06/06/17  0716   WBC 12.2*  --  10.4 19.0*   HGB 10.0* 9.9* 11.6* 10.6*     --  257 321     1. PLAN:   Continue ASA 81mg bid for DVT prophylaxis.     Mobilize with PT/OT    WBAT right LE with walker, no precautions.     Continue current pain regiment.   Dressings: Keep intact.  Change if >60% saturated or peeling off.    Follow-up: 2 weeks post-op with Dr Deng team    2. Disposition   Anticipate d/c likely to TCU when medically cleared and progressing in PT.  Ortho stable.     Roxanne Benitez PA-C

## 2023-12-13 NOTE — PLAN OF CARE
Goal Outcome Evaluation:  Plan of Care Reviewed With: child  Alert but disoriented x4. VSS. On RA.  Scheduled Tylenol given & Oxycodone PRN x1 for pain management. On regular diet. Requires assistance feeding. R hip incision dressing is dry & intact. Assist x2 using ceiling lift. Bladder scan x1. PVR reading of 30ml. Incontinent of B&B. Pulled IV twice. 3rd one placed by Vascular access on R fore arm. LR infusing at 100ml/hr.

## 2023-12-13 NOTE — PROGRESS NOTES
[facility]    Medicine Progress Note - Hospitalist Service    Date of Admission:  12/10/2023    Assessment & Plan   Mel Lazaro is a 89 year old female with Pmhx of CKD III, former tobacco dependence with possible COPD, dementia, HTN, hypothyroidism, who was admitted on 12/10/2023 after an unwitnessed fall presented with right hip pain. Found to have right femoral neck, acetabular and pubic rami fractures. Admitted for further cares with Orthopedic surgery consultation.         Fall, unwitnessed   Right femoral neck s/p ORIF 12/11  Acetabular and pubic rami fractures  Right Hip Effusion   Fell afternoon of 12/9, unwitnessed. Independent with ambulation at baseline. No preceding illness or known presyncopal symptoms prior to fall. She has assistance from her daughter's who take shifts caring for her in the home, when they are not there they have cameras to various points in the home for monitoring.  Brought in for right hip pain, difficulty mobilizing. Found to have right femoral neck, acetabular and right pubic rami fractures. Admitted for further cares with Orthopedic surgery consultation.   No hx of previous adverse issues with anesthesia, no known cardiopulmonary disease.    -Inpatient  -lower suspicion for rhabdo given was not down unassisted for long, added UA and CK for completeness   -Ortho consulted, surgery completed   -Diet as tolerated  -analgesia PRN  -bedrest, therapy and SW consults post op when appropriate for dispo planning. Anticipate TCU.      Subacute L2 Compression Fracture   On imaging noted age indeterminate  superior endplate L2 compression fracture with 25% vertebral body height loss on CT Lumbar spine. Currently main area of pain right hip and lower extremity rather than back.  NCHCT, CT C spine negative.   History of previous back pain treated supportively, had imaging that was negative at outside orthopedics per family.   - monitor for back pain  - analgesia PRN       CKD 3  Lab work with borderline mild hyponatremia, Cr 1.43 with GFR of 34. Last Cr 1.77 near baseline of ~1.6-1.8 when checked 2 years ago   Creatinine   Date Value Ref Range Status   12/11/2023 1.53 (H) 0.51 - 0.95 mg/dL Final   06/06/2017 1.00 0.52 - 1.04 mg/dL Final         -Encourage oral intake  -avoid nsaids for pain      Lack of dentition  -soft diet, aspiration precautions      Former tobacco dependence - no formal diagnosis of COPD but suspected in previous hospitalizations. No hypoxia or respiratory symptoms.      Alzheimer's disease: on aricept and zolofr. Pt still lives independently with rotating supervision from family.      Hypothyroidism:  Resumed synthroid     HLD:  Resumed zocor          Diet: Regular Diet Adult    DVT Prophylaxis: Defer to primary service  Garza Catheter: Not present  Lines: None     Cardiac Monitoring: None  Code Status: No CPR- Do NOT Intubate      Clinically Significant Risk Factors                                    Disposition Plan      Expected Discharge Date: 12/13/2023,  9:00 AM    Destination: inpatient rehabilitation facility  Discharge Comments: to TCU            Rey Desai MD  Hospitalist Service    Securely message with [email or web address] (more info)  Text page via [employer] [facility] Paging/Directory   ______________________________________________________________________    Interval History   Pleasantly demented, not in pain, cooperative.  Renal failure has worsening, suspect inadequate volume repletion, continue LR for in the next 24 hours, repeat lab workup in AM.  Adjust such as needed..  No nausea, vomiting, chest pain or shortness of breath. .    Physical Exam   Vital Signs: Temp: 98.3  F (36.8  C) Temp src: Temporal BP: (!) 148/51 Pulse: 74   Resp: 18 SpO2: 97 % O2 Device: None (Room air) Oxygen Delivery: 2 LPM  Weight: 0 lbs 0 oz    GEN: [place],  Alert, cooperates, appears comfortable, NAD.  HEENT:  Normocephalic/atraumatic, no scleral  icterus, no nasal discharge, mouth moist.  CV:  Regular rate and rhythm, no murmur or JVD.  S1 + S2 noted, no S3 or S4.  LUNGS:  Clear to auscultation bilaterally without rales/rhonchi/wheezing/retractions.  Symmetric chest rise on inhalation noted.  ABD:  Active bowel sounds, soft, non-tender/non-distended.  No rebound/guarding/rigidity.  EXT:  No edema or cyanosis.  No joint synovitis noted.  SKIN:  Dry to touch, no exanthems noted in the visualized areas.         Medical Decision Making     25 MINUTES SPENT BY ME on the date of service doing chart review, history, exam, documentation & further activities per the note.      Data     I have personally reviewed the following data over the past 24 hrs:    N/A  \   9.9 (L)   / N/A     N/A N/A N/A /  100 (H)   N/A N/A N/A \

## 2023-12-14 NOTE — PLAN OF CARE
[id number]    VS BP (!) 158/73 (BP Location: Left arm)   Pulse 86   Temp 97.7  F (36.5  C) (Temporal)   Resp 18   SpO2 96%   Lung sounds diminished  O2 room air  Bowel sounds hypoactive  Tolerating regular/soft diet, minimal appetite, daughters were able to get patient to eat some bites  IVF no IV access  Dressings patient pulled off aquacel dressing, cleaned with wound  and new aquacel applied  CMS disorientated x4  Activity bedrest today, patient sleeping in between cares  Pain new orders for dilaudid  Patient/family centered care 3 daughters present for care conference today  Discharge plan plan to discharge on hospice when placement found

## 2023-12-14 NOTE — PROVIDER NOTIFICATION
Pt has pulled 3 IVs out overnight. IVF still ordered as pt is not eating much. Care conference today to discuss goals of care. Cross cover hospitalist text paged with request to leave IV out until care conference.     Per Dr. Hernandez, can leave out IV for now.

## 2023-12-14 NOTE — PROGRESS NOTES
Upon assessment, pt had removed surgical dressing. Incision cleaned with wound  and new aquacel dressing applied.

## 2023-12-14 NOTE — PROGRESS NOTES
Care Management Follow Up    Length of Stay (days): 4    Expected Discharge Date: 12/14/2023     Concerns to be Addressed: discharge planning     Patient plan of care discussed at interdisciplinary rounds: Yes    Anticipated Discharge Disposition: Skilled Nursing Facility, Hospice     Anticipated Discharge Services:    Anticipated Discharge DME:      Patient/family educated on Medicare website which has current facility and service quality ratings: yes  Education Provided on the Discharge Plan:    Patient/Family in Agreement with the Plan: yes    Referrals Placed by CM/SW: Post Acute Facilities    Additional Information:  SW met with three daughters and palliative for a care conference on goals of care. Daughters have agreed to have pt go to a LTC facility with hospice. They prefer a facility closer to a sister in the [facility]. SW sent referrals and followed up with a  to see if they can accept pt.      [facility] pt for LTC, informed daughters.  SW spoke to [facility] and they can accept pt Monday. SW confirm with [facility] once we know pt can't get closer to daughters.    Update: [facility] called asking to do a nurse to nurse to see if they can accept pt, relayed contact info to bedside nurse.  Nurse reports leaving a VM, no return call yet.    Micaela Capone, CHARLES, [facility]  Inpatient Care Coordination  [facility]  [phone]

## 2023-12-14 NOTE — CONSULTS
Palliative Care Consultation Note  [facility]      Patient: Mel Lazaro  Date of Admission:  12/10/2023    Requesting Clinician / Team: Hospitalist Rey Desai MD   Reason for consult: Set goals of care. Family request        Recommendations & Counseling     GOALS OF CARE:   Life-prolonging with limits - Family plans to enroll Mel in Hospice with dismissal to LTC.     ADVANCE CARE PLANNING:  No health care directive on file. Per  informed consent policy, next of kin should be involved if patient becomes unable.  New POLST form completed today.   Code status: No CPR- Do NOT Intubate    MEDICAL MANAGEMENT:   #Pain,acute   [place] Board of Pharmacy Data Base Reviewed: Yes:   reviewed - no record of controlled substances prescribed.  Opioids: Patient's opioid use in past 24 hours: 7.5 mg Oxycodone = 11.25 mg Daily Morphine Equivalent   Given the patient will dismiss on hospice, it would be reasonable to move to hospice preferred opiate, hydromorphone (Dilaudid) PRN  Acetaminophen (Tylenol), PRN  NSAIDs:   Not recommended due to advanced age  Other medicines for pain: Lido derm and Menthol Patches for pain  Ice and Physical Therapy       #General Weakness/Debility   Appreciate Physical Therapy recommendations  Family plans to have Mel dismiss to LTC with support of hospice    PSYCHOSOCIAL/SPIRITUAL SUPPORT:  Family 3 daughters (Tanya, Carlita, and Hemalatha) are supportive. Son who lives in [place] is not involved in cares    [place] community: [facility]     Palliative Care will continue to follow. Thank you for the consult and allowing us to aid in the care of Mel Lazaro.    These recommendations have been discussed with Hospitalist Rey Desai MD ;  CORNELIO Hinojosa and bedside nurse OLU Mejia.    MORENA Dougherty CNS  Securely message with [employer] (more info)  Text page via [facility] Paging/Directory       Assessment      Mel Lazaro is a 89 year old  "female admitted 12/10/2023 for complaint of right hip pain and found to have right femoral neck, acetabular and right pubic rami fractures. The patient has history of advanced dementia and has resided independently at home with the support of her three daughters. She feel on the afternoon of 12/9. The patient underwent right closed reduction and internal fixation of right femoral neck fracture on 12/11/2023. The patient's medical history is significant for CKD III, dementia, hypertension, hypothyroidism, dementia and probable COPD given her history of tobacco dependence.       Today, the patient was seen for:  Goals of care    Palliative Care Summary:   Met with the patient's three daughters, Tanya Zazueta and Hemalatha at bedside. They requested to meet outside the patient's room, so we moved to the sixth floor conference room.  CORNELIO Hinojosa joined our discussion. I introduced our role as an extra layer of support and how we help patients and families dealing with serious, potentially life-limiting illnesses. I explained the composition of the palliative care team.  Palliative care helps patients and families navigate their care while focusing on the whole person; providing emotional, social and spiritual support  Palliative care often assists with symptom management, information sharing about what to expect from the illness, available treatment options and what effect those options may have on the disease course, and provide effective communication and caring support. Daughter's explained they have been taking care of Mel in her home. They agree \"Rehab is out of the question.\" Hemalatha offered to consider taking her mother to her home, but her sister's offered concern regarding the burden this would be on her. After much discussion regarding options they agree with plan to dismiss to LTC with support of hospice.      Prognosis, Goals, & Planning:    Functional Status just prior to this current " hospitalization:  Interim history/status while hospitalized: This is the patient's first hospitalization. The patient has poor oral intake during this hospitalization as this morning's breakfast of oatmeal with brown sugar was the first time she ate more than a few bites.  Due to impaired cognition and impaired ability to follow commands, LTC is recommended.     Prognosis, Goals, and/or Advance Care Planning:  We discussed general treatment options (full/restorative, selective/conservatives, and comfort only/hospice). We then discussed how these specifically apply to Mel's debilitated state.  Based on this discussion, her family has decided to focus on comfort with dismissal to LTC    Code Status was addressed today:   Yes, We discussed potential risks and rationale of attempting cardiac resuscitation, intubation, and mechanical ventilation.  We also discussed probability of survival as well as quality of life implications.  Based on this discussion, patient or surrogate response/decision: No CPR- Do NOT Intubate      Patient's decision making preferences: unable to assess        Patient has decision-making capacity today for complex decisions:Unreliable            Coping, Meaning, & Spirituality:   Mood, coping, and/or meaning in the context of serious illness were addressed today: Yes    Social:   Living situation:lives alone with significant support from her three daughters    Medications:  I have reviewed this patient's medication profile and medications from this hospitalization.      ROS:  Comprehensive ROS is reviewed and is negative except as here & per HPI:     Physical Exam   Vital Signs with Ranges  Temp:  [97.1  F (36.2  C)-97.8  F (36.6  C)] 97.8  F (36.6  C)  Pulse:  [71-80] 79  Resp:  [14-16] 16  BP: (133-157)/(54-68) 157/68  SpO2:  [96 %-98 %] 97 %  0 lbs 0 oz    PHYSICAL EXAM:    GEN:  Alert, oriented to self only, appears comfortable, no apparent distress.  HEENT:  Normocephalic/atraumatic, no  scleral icterus, no nasal discharge, mouth moist.  CV:  RRR, S1, S2; no murmurs or other irregularities noted.  +3 DP/PT pulses bilaterally; no edema BLE.  RESP:  Clear to auscultation bilaterally without rales/rhonchi/wheezing/retractions.  Symmetric chest rise on inhalation noted.  Normal respiratory effort.  ABD:  Rounded, soft, non-tender/non-distended.  +BS  EXT:  Edema & pulses as noted above.  CMS intact x 4.     M/S:   Right leg is tender to palpation.    SKIN:  Warm and dry to touch, no exanthems noted in the visualized areas.        Data reviewed:  Results for orders placed or performed during the hospital encounter of 12/10/23   CT Head w/o Contrast     Status: None    Narrative    EXAM: CT HEAD W/O CONTRAST  LOCATION: [facility]  DATE: 12/10/2023    INDICATION: trauma  COMPARISON: None.  TECHNIQUE: Routine CT Head without IV contrast. Multiplanar reformats. Dose reduction techniques were used.    FINDINGS:  INTRACRANIAL CONTENTS: No evidence of acute intracranial hemorrhage or mass effect. Scattered foci of decreased attenuation within the cerebral hemispheric white matter which are nonspecific, though most commonly ascribed to chronic small vessel ischemic   disease. The ventricles and sulci are prominent consistent with moderate brain parenchymal volume loss. Gray-white matter differentiation is maintained. The basilar cisterns are patent.    VISUALIZED ORBITS/SINUSES/MASTOIDS: The globes are unremarkable. The partially imaged paranasal sinuses, mastoid air cells and middle ear cavities are unremarkable.     BONES/SOFT TISSUES: The visualized skull base and calvarium are unremarkable.      Impression    IMPRESSION:    1.  No evidence of acute intracranial hemorrhage or mass effect.  2.  Moderate nonspecific white matter changes.  3.  Moderate brain parenchymal volume loss.   CT Cervical Spine w/o Contrast     Status: None    Narrative    EXAM: CT CERVICAL SPINE W/O  CONTRAST  LOCATION: [facility]  DATE: 12/10/2023    INDICATION: Neck pain; Trauma; Significant trauma  COMPARISON: None.  TECHNIQUE: Routine CT Cervical Spine without IV contrast. Multiplanar reformats. Dose reduction techniques were used.    FINDINGS:  No evidence of acute fracture or subluxation of the cervical spine by CT imaging. Retrolisthesis of C3 on C4 measuring 3 mm. The vertebral bodies of the cervical spine otherwise have normal stature and alignment. Anterior marginal osteophytes at C3/C4 -   C6/C7. Multilevel degenerative disc disease of the cervical spine with disc height loss, most pronounced at C5/C6 and C6/C7. No extraspinal abnormality.     Craniovertebral junction and C1-C2: The odontoid process is well approximated with the anterior body of C1 and well aligned between the lateral masses of C1.    C2-C3: No posterior disc bulge or spinal canal narrowing. No neural foraminal narrowing.     C3-C4: No posterior disc bulge or spinal canal narrowing. No neural foraminal narrowing.     C4-C5: No posterior disc bulge or spinal canal narrowing. Uncovertebral joint disease and facet arthropathy with mild bilateral neural foraminal narrowing.     C5-C6: No posterior disc bulge or spinal canal narrowing. Uncovertebral vertebral joint disease and facet arthropathy contribute to mild bilateral neural foraminal narrowing.     C6-C7: No posterior disc bulge or spinal canal narrowing. No neural foraminal narrowing.     C7-T1: No posterior disc bulge or spinal canal narrowing. No neural foraminal narrowing.       Impression    IMPRESSION:  1.  No evidence of acute fracture or subluxation of the cervical spine by CT imaging.  2.  Degenerative cervical spondylosis as described above.   Lumbar spine CT w/o contrast     Status: None    Narrative    EXAM: CT LUMBAR SPINE W/O CONTRAST  LOCATION: [facility]  DATE: 12/10/2023    INDICATION: Low back pain; Trauma and or  suspected fracture; Significant trauma; No known automatically detected potential contraindications to CT  COMPARISON: None.  TECHNIQUE: Routine CT Lumbar Spine without IV contrast. Multiplanar reformats. Dose reduction techniques were used.    FINDINGS:  Age-indeterminate superior endplate L2 compression fracture with 25% vertebral body height loss. No other evidence of acute fracture or subluxation of the lumbar spine by CT imaging. Anterior marginal osteophytes at L1/L2 - L4/L5. The vertebral bodies of   the lumbar spine otherwise have normal stature and alignment. Multilevel degenerative disc disease of the lumbar spine with disc height loss, most pronounced at L3/L4 and L4/L5.    The partially imaged intra-abdominal contents are unremarkable.    T12/L1:  No posterior disc bulge or spinal canal narrowing. No neural foraminal narrowing.    L1/L2:  No posterior disc bulge or spinal canal narrowing. No neural foraminal narrowing.    L2/L3:  No posterior disc bulge or spinal canal narrowing. No neural foraminal narrowing.    L3/L4:  Symmetric disc bulge and mild facet arthropathy without significant spinal canal narrowing. Mild bilateral neural foraminal narrowing.      L4/L5:  Symmetric disc bulge and mild facet arthropathy without significant spinal canal narrowing. Moderate bilateral neural foraminal narrowing.     L5/S1: No posterior disc bulge or spinal canal narrowing. Moderate bilateral facet arthropathy. No neural foraminal narrowing.       Impression    IMPRESSION:    1.  Age-indeterminate superior endplate L2 compression fracture with 25% vertebral body height loss.  2.  No other evidence of acute fracture or subluxation of the lumbar spine by CT imaging.  3.  Degenerative lumbar spondylosis with level by level analysis as described above.   XR Pelvis and Hip Right 2 Views     Status: None    Narrative    EXAM: XR PELVIS AND HIP RIGHT 2 VIEWS  LOCATION: [facility]  DATE:  12/10/2023    INDICATION: Trauma. Right hip pain.  COMPARISON: None.      Impression    IMPRESSION: Anatomic alignment right hip. Subtle lucency at the subcapital region of the right femoral neck could represent summation artifact or nondisplaced fracture. Bony irregularity at the right inferior pubic ramus is concerning for nondisplaced   right inferior pubic ramus fracture with likely nondisplaced fracture at the right puboacetabular junction superiorly near the lateral right superior pubic ramus. Mild bilateral hip osteoarthritis with chondrocalcinosis. Diffuse bone demineralization.   Degenerative change lower lumbar spine and both SI joints.    Right hip MRI recommended (assuming no MRI contraindication).    NOTE: ABNORMAL REPORT    THE DICTATION ABOVE DESCRIBES AN ABNORMALITY FOR WHICH FOLLOW-UP IS NEEDED.        CT Hip Right w/o Contrast     Status: None    Narrative    EXAM: CT HIP RIGHT W/O CONTRAST  LOCATION: [facility]  DATE: 12/10/2023    INDICATION: Trauma. Right proximal femur fracture.  COMPARISON: Pelvis and right hip radiographic exam earlier today.  TECHNIQUE: Noncontrast. Axial, sagittal and coronal thin-section reconstruction. Dose reduction techniques were used.     FINDINGS:     BONES:  -Anatomic alignment right hip. Nondisplaced subcapital right femoral neck fracture. Nondisplaced fracture anterior column right acetabulum near the anterior puboacetabular junction. Nondisplaced fracture at the right parasymphyseal pubis. Nondisplaced   fracture right inferior pubic ramus. Degenerative change lower lumbar spine and at the right SI joint. Mild right hip osteoarthritis. Arthritic change at the symphysis pubis is at least moderate. No aggressive destructive bone lesion. Diffuse bone   demineralization.    SOFT TISSUES:  -Right hip joint effusion. Small volume blood products particularly near the anterior column right acetabular fracture. Small volume blood products also seen  in the anterior perivesicular space. Distended urinary bladder. Normal appendix. Arterial   calcification. No bulky adenopathy. Tiny fat-containing paraumbilical hernia. Colonic diverticulosis. Lateral right hip soft tissue swelling. Atrophy of the right gluteus minimus.      Impression    IMPRESSION:  1.  Nondisplaced subcapital right femoral neck fracture.  2.  Nondisplaced fracture anterior column right acetabulum near the anterior puboacetabular junction.  3.  Nondisplaced fracture right parasymphyseal pubis.  4.  Nondisplaced right inferior pubic ramus fracture.  5.  Small volume blood products in the right puboacetabular junction fracture and in the anterior perivesicular space.  6.  Mild degenerative right hip osteoarthritis.    NOTE: ABNORMAL REPORT    THE DICTATION ABOVE DESCRIBES AN ABNORMALITY FOR WHICH FOLLOW-UP IS NEEDED.      XR Surgery ROYA L/T 5 Min Fluoro w Stills     Status: None    Narrative    This exam was marked as non-reportable because it will not be read by a   radiologist or a [place] non-radiologist provider.         Basic metabolic panel     Status: Abnormal   Result Value Ref Range    Sodium 134 (L) 135 - 145 mmol/L    Potassium 4.0 3.4 - 5.3 mmol/L    Chloride 99 98 - 107 mmol/L    Carbon Dioxide (CO2) 27 22 - 29 mmol/L    Anion Gap 8 7 - 15 mmol/L    Urea Nitrogen 25.8 (H) 8.0 - 23.0 mg/dL    Creatinine 1.43 (H) 0.51 - 0.95 mg/dL    GFR Estimate 34 (L) >60 mL/min/1.73m2    Calcium 8.5 8.2 - 9.6 mg/dL    Glucose 119 (H) 70 - 99 mg/dL   CBC with platelets and differential     Status: Abnormal   Result Value Ref Range    WBC Count 10.4 4.0 - 11.0 10e3/uL    RBC Count 3.86 3.80 - 5.20 10e6/uL    Hemoglobin 11.6 (L) 11.7 - 15.7 g/dL    Hematocrit 36.1 35.0 - 47.0 %    MCV 94 78 - 100 fL    MCH 30.1 26.5 - 33.0 pg    MCHC 32.1 31.5 - 36.5 g/dL    RDW 13.4 10.0 - 15.0 %    Platelet Count 257 150 - 450 10e3/uL    % Neutrophils 85 %    % Lymphocytes 8 %    % Monocytes 7 %    % Eosinophils 0 %     % Basophils 0 %    % Immature Granulocytes 0 %    NRBCs per 100 WBC 0 <1 /100    Absolute Neutrophils 8.7 (H) 1.6 - 8.3 10e3/uL    Absolute Lymphocytes 0.8 0.8 - 5.3 10e3/uL    Absolute Monocytes 0.7 0.0 - 1.3 10e3/uL    Absolute Eosinophils 0.0 0.0 - 0.7 10e3/uL    Absolute Basophils 0.0 0.0 - 0.2 10e3/uL    Absolute Immature Granulocytes 0.0 <=0.4 10e3/uL    Absolute NRBCs 0.0 10e3/uL   UA with Microscopic reflex to Culture     Status: Abnormal    Specimen: Urine, Straight Catheter   Result Value Ref Range    Color Urine Yellow Colorless, Straw, Light Yellow, Yellow    Appearance Urine Slightly Cloudy (A) Clear    Glucose Urine Negative Negative mg/dL    Bilirubin Urine Negative Negative    Ketones Urine Negative Negative mg/dL    Specific Gravity Urine 1.020 1.003 - 1.035    Blood Urine Negative Negative    pH Urine 6.0 5.0 - 7.0    Protein Albumin Urine 30 (A) Negative mg/dL    Urobilinogen Urine Normal Normal, 2.0 mg/dL    Nitrite Urine Negative Negative    Leukocyte Esterase Urine Negative Negative    Bacteria Urine Few (A) None Seen /HPF    Mucus Urine Present (A) None Seen /LPF    RBC Urine 0 <=2 /HPF    WBC Urine 3 <=5 /HPF    Squamous Epithelials Urine <1 <=1 /HPF    Narrative    Urine Culture not indicated   CK total     Status: Normal   Result Value Ref Range    CK 53 26 - 192 U/L   Basic metabolic panel     Status: Abnormal   Result Value Ref Range    Sodium 138 135 - 145 mmol/L    Potassium 4.1 3.4 - 5.3 mmol/L    Chloride 101 98 - 107 mmol/L    Carbon Dioxide (CO2) 26 22 - 29 mmol/L    Anion Gap 11 7 - 15 mmol/L    Urea Nitrogen 28.9 (H) 8.0 - 23.0 mg/dL    Creatinine 1.53 (H) 0.51 - 0.95 mg/dL    GFR Estimate 31 (L) >60 mL/min/1.73m2    Calcium 8.5 8.2 - 9.6 mg/dL    Glucose 110 (H) 70 - 99 mg/dL   Glucose by meter     Status: Abnormal   Result Value Ref Range    GLUCOSE BY METER POCT 103 (H) 70 - 99 mg/dL   Hemoglobin     Status: Abnormal   Result Value Ref Range    Hemoglobin 9.9 (L) 11.7 - 15.7  g/dL   Glucose     Status: Abnormal   Result Value Ref Range    Glucose 100 (H) 70 - 99 mg/dL   Basic metabolic panel     Status: Abnormal   Result Value Ref Range    Sodium 133 (L) 135 - 145 mmol/L    Potassium 4.8 3.4 - 5.3 mmol/L    Chloride 101 98 - 107 mmol/L    Carbon Dioxide (CO2) 26 22 - 29 mmol/L    Anion Gap 6 (L) 7 - 15 mmol/L    Urea Nitrogen 42.7 (H) 8.0 - 23.0 mg/dL    Creatinine 1.52 (H) 0.51 - 0.95 mg/dL    GFR Estimate 31 (L) >60 mL/min/1.73m2    Calcium 7.9 (L) 8.2 - 9.6 mg/dL    Glucose 88 70 - 99 mg/dL   CBC with platelets     Status: Abnormal   Result Value Ref Range    WBC Count 12.2 (H) 4.0 - 11.0 10e3/uL    RBC Count 3.34 (L) 3.80 - 5.20 10e6/uL    Hemoglobin 10.0 (L) 11.7 - 15.7 g/dL    Hematocrit 31.9 (L) 35.0 - 47.0 %    MCV 96 78 - 100 fL    MCH 29.9 26.5 - 33.0 pg    MCHC 31.3 (L) 31.5 - 36.5 g/dL    RDW 13.6 10.0 - 15.0 %    Platelet Count 242 150 - 450 10e3/uL   Glucose by meter     Status: Normal   Result Value Ref Range    GLUCOSE BY METER POCT 89 70 - 99 mg/dL   Procalcitonin     Status: Abnormal   Result Value Ref Range    Procalcitonin 1.75 (H) <0.50 ng/mL   Basic metabolic panel     Status: Abnormal   Result Value Ref Range    Sodium 135 135 - 145 mmol/L    Potassium 4.4 3.4 - 5.3 mmol/L    Chloride 102 98 - 107 mmol/L    Carbon Dioxide (CO2) 26 22 - 29 mmol/L    Anion Gap 7 7 - 15 mmol/L    Urea Nitrogen 48.8 (H) 8.0 - 23.0 mg/dL    Creatinine 1.27 (H) 0.51 - 0.95 mg/dL    GFR Estimate 39 (L) >60 mL/min/1.73m2    Calcium 7.8 (L) 8.2 - 9.6 mg/dL    Glucose 87 70 - 99 mg/dL   CBC with platelets and differential     Status: Abnormal   Result Value Ref Range    WBC Count 11.0 4.0 - 11.0 10e3/uL    RBC Count 2.98 (L) 3.80 - 5.20 10e6/uL    Hemoglobin 8.9 (L) 11.7 - 15.7 g/dL    Hematocrit 28.6 (L) 35.0 - 47.0 %    MCV 96 78 - 100 fL    MCH 29.9 26.5 - 33.0 pg    MCHC 31.1 (L) 31.5 - 36.5 g/dL    RDW 13.8 10.0 - 15.0 %    Platelet Count 280 150 - 450 10e3/uL    % Neutrophils 77 %    %  Lymphocytes 11 %    % Monocytes 7 %    % Eosinophils 3 %    % Basophils 1 %    % Immature Granulocytes 1 %    NRBCs per 100 WBC 0 <1 /100    Absolute Neutrophils 8.5 (H) 1.6 - 8.3 10e3/uL    Absolute Lymphocytes 1.2 0.8 - 5.3 10e3/uL    Absolute Monocytes 0.8 0.0 - 1.3 10e3/uL    Absolute Eosinophils 0.4 0.0 - 0.7 10e3/uL    Absolute Basophils 0.1 0.0 - 0.2 10e3/uL    Absolute Immature Granulocytes 0.1 <=0.4 10e3/uL    Absolute NRBCs 0.0 10e3/uL   EKG 12-lead, tracing only     Status: None   Result Value Ref Range    Systolic Blood Pressure  mmHg    Diastolic Blood Pressure  mmHg    Ventricular Rate 68 BPM    Atrial Rate 68 BPM    WA Interval 192 ms    QRS Duration 68 ms     ms    QTc 433 ms    P Axis 36 degrees    R AXIS 86 degrees    T Axis 90 degrees    Interpretation ECG       Sinus rhythm  Nonspecific ST and T wave abnormality  Abnormal ECG  When compared with ECG of 30-MAY-2017 19:27,  Nonspecific T wave abnormality now evident in Inferior leads     Adult Type and Screen     Status: None   Result Value Ref Range    ABO/RH(D) O POS     Antibody Screen Negative Negative    SPECIMEN EXPIRATION DATE [id number]    CBC with platelets differential     Status: Abnormal    Narrative    The following orders were created for panel order CBC with platelets differential.  Procedure                               Abnormality         Status                     ---------                               -----------         ------                     CBC with platelets and d...[[id number]]  Abnormal            Final result                 Please view results for these tests on the individual orders.   ABO/Rh type and screen     Status: None    Narrative    The following orders were created for panel order ABO/Rh type and screen.  Procedure                               Abnormality         Status                     ---------                               -----------         ------                     Adult Type and  Screen[[id number]]                            Final result                 Please view results for these tests on the individual orders.   CBC with Platelets & Differential     Status: Abnormal    Narrative    The following orders were created for panel order CBC with Platelets & Differential.  Procedure                               Abnormality         Status                     ---------                               -----------         ------                     CBC with platelets and d...[[id number]]  Abnormal            Final result                 Please view results for these tests on the individual orders.           MANAGEMENT DISCUSSED with the following over the past 24 hours: Micaela LSW; Hospitalist Rey Desai MD and bedside nurse OLU Mejia.   10:30 AM until 11:50 AM 80 MINUTES SPENT BY ME on the date of service doing chart review, history, exam, documentation & further activities per the note.

## 2023-12-14 NOTE — PROGRESS NOTES
Orthopedic Surgery  Mel Lazaro  12/14/2023     Admit Date:  12/10/2023    POD: 3 Days Post-Op   Procedure(s):  Closed reduction and internal fixation of right femoral neck fracture     Patient resting comfortably in bed.     Pain controlled at rest  Tolerating oral intake.    Denies nausea or vomiting  Denies chest pain or shortness of breath    Temp:  [97.1  F (36.2  C)-97.8  F (36.6  C)] 97.8  F (36.6  C)  Pulse:  [71-80] 79  Resp:  [14-16] 16  BP: (133-157)/(54-68) 157/68  SpO2:  [96 %-98 %] 97 %    Dementia at baseline.   Dressing is clean, dry, and intact.   Minimal erythema of the surrounding skin.   Bilateral calves are soft, non-tender.  Right lower extremity is NVI.  Sensation intact bilateral lower extremities  Patient able to dorsi and plantar flex bilaterally  +Dp pulse    Labs:  Recent Labs   Lab Test 12/14/23  0844 12/13/23  0646 12/12/23  0705 12/10/23  1228   WBC 11.0 12.2*  --  10.4   HGB 8.9* 10.0* 9.9* 11.6*    242  --  257     1. PLAN:   Continue ASA 81mg bid for DVT prophylaxis.     Mobilize with PT/OT    WBAT right LE with walker, no precautions.     Continue current pain regiment.   Dressings: Keep intact.  Change if >60% saturated or peeling off.    Follow-up: 2 weeks post-op with Dr Deng team    2. Disposition   Anticipate d/c likely to TCU when medically cleared and progressing in PT.  Ortho stable.     Roxanne Benitez PA-C

## 2023-12-14 NOTE — PLAN OF CARE
Goal Outcome Evaluation:            Pt is alert to self only. Pt with right hip dressing dry and intact. Pt with no IV access at this time, can reassess later  if needed. Pt straight cathed X1 for 550 ml urine output. Care conference today at 1030 with MD, Palliative and pt's daughters to [facility] goals of care and next steps.

## 2023-12-15 NOTE — PROGRESS NOTES
PALLIATIVE CARE PROGRESS NOTE  [facility]     Patient Name: Mel Lazaro  Date of Admission: 12/10/2023   Today the patient was seen for: Emotional support     Recommendations & Counseling     GOALS OF CARE:   Life-prolonging with limits - Family plans to enroll Mel in Hospice with dismissal to LTC.      ADVANCE CARE PLANNING:  No health care directive on file. Per  informed consent policy, next of kin should be involved if patient becomes unable.  POLST form completed 12/14/2023 is found in [facility] docs.   Code status: No CPR- Do NOT Intubate     MEDICAL MANAGEMENT:     #Pain,acute   [place] Board of Pharmacy Data Base Reviewed: Yes:   reviewed - no record of controlled substances prescribed.  Opioids: Patient's opioid use in past 24 hours: 7.5 mg Oxycodone = 11.25 mg Daily Morphine Equivalent   Given the patient will dismiss on hospice, it would be reasonable to move to hospice preferred opiate, hydromorphone (Dilaudid) PRN  Acetaminophen (Tylenol), PRN  NSAIDs:   Not recommended due to advanced age  Other medicines for pain: Lido derm and Menthol Patches for pain  Ice and Physical Therapy      #General Weakness/Debility   Appreciate Physical Therapy recommendations  Family plans to have Mel dismiss to LTC with support of hospice     PSYCHOSOCIAL/SPIRITUAL SUPPORT:  Family 3 daughters (Tanya, Carlita, and Hemalatha) are supportive. Son who lives in [place] is not involved in [facility] community: [facility]     Palliative Care will continue to follow. Thank you for the consult and allowing us to aid in the care of Mel Lazaro.    These recommendations have been discussed with bedside nurse OLU Kitchen.    MORENA Dougherty CNS  Securely message with Pulse (more info)  Text page via [facility] Paging/Directory          Interval History:     Multidisciplinary collaboration:  Appreciate input of bedside nurse regarding need for russo catheter. Order placed.    Patient/family  narrative  Met with the patient's daughter, Carlita at bedside. She acknowledged the plan to dismiss with the support of hospice and reminisced about her parents hardships over the years. Hospitalist Rey Desai MD joined our discussion.    Palliative Summary/HPI            Mel Lazaro is a 89 year old female admitted 12/10/2023 for complaint of right hip pain and found to have right femoral neck, acetabular and right pubic rami fractures. The patient has history of advanced dementia and has resided independently at home with the support of her three daughters. She feel on the afternoon of 12/9. The patient underwent right closed reduction and internal fixation of right femoral neck fracture on 12/11/2023. The patient's medical history is significant for CKD III, dementia, hypertension, hypothyroidism, dementia and probable COPD given her history of tobacco dependence.              Review of Systems:     Besides above, an additional  system ROS was reviewed and is unremarkable               Physical Exam:   Temp:  [97.4  F (36.3  C)-98  F (36.7  C)] 97.4  F (36.3  C)  Pulse:  [] 89  Resp:  [18-22] 22  BP: (140-158)/(63-84) 157/63  SpO2:  [95 %-97 %] 97 %  0 lbs 0 oz    Physical Exam                 Current Problem List:   Principal Problem:    Fall, initial encounter  Active Problems:    Multiple closed fractures of pelvis without disruption of pelvic ring, initial encounter (H)    Closed fracture of neck of right femur, initial encounter (H)      No Known Allergies         Data Reviewed:     Results for orders placed or performed during the hospital encounter of 12/10/23 (from the past 24 hour(s))   UA with Microscopic reflex to Culture    Specimen: Urine, Catheter   Result Value Ref Range    Color Urine Light Yellow Colorless, Straw, Light Yellow, Yellow    Appearance Urine Clear Clear    Glucose Urine Negative Negative mg/dL    Bilirubin Urine Negative Negative    Ketones Urine Negative Negative mg/dL     Specific Gravity Urine 1.021 1.003 - 1.035    Blood Urine Negative Negative    pH Urine 6.0 5.0 - 7.0    Protein Albumin Urine 10 (A) Negative mg/dL    Urobilinogen Urine Normal Normal, 2.0 mg/dL    Nitrite Urine Negative Negative    Leukocyte Esterase Urine Negative Negative    RBC Urine <1 <=2 /HPF    WBC Urine <1 <=5 /HPF    Narrative    Urine Culture not indicated         MANAGEMENT DISCUSSED with the following over the past 24 hours: Hospitalist Rey Desai MD and bedside nurse OLU Kitchen   20  MINUTES SPENT BY ME on the date of service doing chart review, history, exam, documentation & further activities per the note.

## 2023-12-15 NOTE — PROGRESS NOTES
Orthopedic Surgery  Mel Lazaro  12/15/2023     Admit Date:  12/10/2023    POD: 4 Days Post-Op   Procedure(s):  Closed reduction and internal fixation of right femoral neck fracture     Patient resting comfortably in bed.  Nursing at bedside for positioning, and hygiene cares   No new complaints or concerns today per staff. Pain controlled   Tolerating oral intake.    Denies nausea or vomiting  Denies chest pain or shortness of breath    Temp:  [97.4  F (36.3  C)-98  F (36.7  C)] 97.4  F (36.3  C)  Pulse:  [] 89  Resp:  [18-22] 22  BP: (140-158)/(63-84) 157/63  SpO2:  [95 %-97 %] 97 %    Dementia at baseline.   Dressing is clean, dry, and intact.   Minimal erythema of the surrounding skin.   Right lower extremity is NVI.  +Dp pulse    Labs:  Recent Labs   Lab Test 12/14/23  0844 12/13/23  0646 12/12/23  0705 12/10/23  1228   WBC 11.0 12.2*  --  10.4   HGB 8.9* 10.0* 9.9* 11.6*    242  --  257     1. PLAN:   Continue ASA 81mg bid for DVT prophylaxis.     Mobilize with PT/OT    WBAT right LE with walker, no precautions.     Continue current pain regimen   Dressings: Keep intact.  Change if >60% saturated or peeling off.    Follow-up: 2 weeks post-op with Dr Deng team    2. Disposition   Anticipate d/c likely to TCU when medically cleared and progressing in PT.  Ortho stable.     Taryn Laguna PA-C

## 2023-12-15 NOTE — PLAN OF CARE
Goal Outcome Evaluation:                      Pt is confused, DNR /DNI, on RA, sat 95-96%. LS diminished, wheezing. Pt was restless, moaning, removed  hip dressing. MD was paged via vocera messaging. Pt got order for PO  X1 Zyprexa.PO dilaudid given, all medications crushed with apple sauce. IV to LUE covered and SL. On regular, soft diet.requires assistance  feeding.  Very poor food and fluid intake. Bladder scan  result-454. UA is ordered. pt is A-2 with lift. Plan is to discharge pt LTC  with hospice.      Addendum:  pt is calm and sleeping Zyprexa was given per order.Pt did not remove  R/ hip dressing and IV so far intact. bladder scan result was 548 and straight cath performed per order. UA collected.

## 2023-12-15 NOTE — PROGRESS NOTES
Care Management Follow Up    Length of Stay (days): 5    Expected Discharge Date: 12/18/2023     Concerns to be Addressed: all concerns addressed in this encounter     Patient plan of care discussed at interdisciplinary rounds: Yes    Anticipated Discharge Disposition: Hospice     Anticipated Discharge Services:    Anticipated Discharge DME:      Patient/family educated on Medicare website which has current facility and service quality ratings: yes  Education Provided on the Discharge Plan: Yes  Patient/Family in Agreement with the Plan: yes    Referrals Placed by CM/SW: Transportation, Hospice, Post Acute Facilities  Private pay costs discussed: transportation costs    Additional Information:  [facility], [facility]  Pt can admit Monday.  Family agreed to [facility]'s hospice recommendation [facility], referral sent and accepted.    Family chose [employer] for transportation and are aware of the fees and how to pay.  Transport at 10 am Monday, two daughter will be here for discharge.    KAY Hill at LT, notified of transport time.    Hospice and LTC request for discharge medications: 3 days worth, no liquids and send solutabs.    Please add HOSPICE EVAL and TREAT orders.    CHARLES Mitchell, [facility]  Inpatient Care Coordination  [facility]  [phone]

## 2023-12-15 NOTE — PROGRESS NOTES
[facility]    Medicine Progress Note - Hospitalist Service    Date of Admission:  12/10/2023    Assessment & Plan   Mel Lazaro is a 89 year old female with Pmhx of CKD III, former tobacco dependence with possible COPD, dementia, HTN, hypothyroidism, who was admitted on 12/10/2023 after an unwitnessed fall presented with right hip pain. Found to have right femoral neck, acetabular and pubic rami fractures. Admitted for further cares with Orthopedic surgery consultation.         Fall, unwitnessed   Right femoral neck s/p ORIF 12/11  Acetabular and pubic rami fractures  Right Hip Effusion   Fell afternoon of 12/9, unwitnessed. Independent with ambulation at baseline. No preceding illness or known presyncopal symptoms prior to fall. She has assistance from her daughter's who take shifts caring for her in the home, when they are not there they have cameras to various points in the home for monitoring.  Brought in for right hip pain, difficulty mobilizing. Found to have right femoral neck, acetabular and right pubic rami fractures. Admitted for further cares with Orthopedic surgery consultation.   No hx of previous adverse issues with anesthesia, no known cardiopulmonary disease.    -Inpatient  -lower suspicion for rhabdo given was not down unassisted for long, added UA and CK for completeness   -Ortho consulted, surgery completed   -Diet as tolerated  -analgesia PRN  -bedrest, therapy and SW consults post op when appropriate for dispo planning. Anticipate LTCF.     Leukocytosis.  Unclear cause.  Procalcitonin 1.75.  WBC normal.  Send UA with micro reflex     Subacute L2 Compression Fracture   On imaging noted age indeterminate  superior endplate L2 compression fracture with 25% vertebral body height loss on CT Lumbar spine. Currently main area of pain right hip and lower extremity rather than back.  NCHCT, CT C spine negative.   History of previous back pain treated supportively, had imaging  that was negative at outside orthopedics per family.   - monitor for back pain  - analgesia PRN      HETAL on CKD 3  Lab work with borderline mild hyponatremia, Cr 1.43 with GFR of 34. Last Cr 1.77 near baseline of ~1.6-1.8 when checked 2 years ago   Creatinine   Date Value Ref Range Status   12/14/2023 1.27 (H) 0.51 - 0.95 mg/dL Final   06/06/2017 1.00 0.52 - 1.04 mg/dL Final     Improving after NS bolus    -Encourage oral intake  -avoid nsaids for pain      Lack of dentition  -soft diet, aspiration precautions      Former tobacco dependence - no formal diagnosis of COPD but suspected in previous hospitalizations. No hypoxia or respiratory symptoms.      Alzheimer's disease: on aricept and zolofr. Pt still lives independently with rotating supervision from family.      Hypothyroidism:  Resumed synthroid     HLD:  Resumed zocor    Hyponatremia.  Corrected.           Diet: Regular Diet Adult    DVT Prophylaxis: Defer to primary service  Garza Catheter: Not present  Lines: None     Cardiac Monitoring: None  Code Status: No CPR- Do NOT Intubate      Clinically Significant Risk Factors                                    Disposition Plan      Expected Discharge Date: 12/18/2023,  9:00 AM    Destination: inpatient rehabilitation facility  Discharge Comments: LTC with hospice            Rey Desai MD  Hospitalist Service  [facility]  Securely message with Digital Folio (more info)  Text page via [facility] Paging/Directory   ______________________________________________________________________    Interval History   Meeting with Palliative to set goals of care. She is DNR/DNI now. Plan to go to LTCF and lately hospice per family decision, which is appropriated and realistic. Recheck UA, PCT in AM. Hydration overnight.     Physical Exam   Vital Signs: Temp: 98  F (36.7  C) Temp src: Temporal BP: (!) 150/84 Pulse: 117   Resp: 20 SpO2: 96 % O2 Device: None (Room air)    Weight: 0 lbs 0 oz    GEN: [place],  somnolent, cooperates, appears comfortable, NAD.  HEENT:  Normocephalic/atraumatic, no scleral icterus, no nasal discharge, mouth moist.  CV:  Regular rate and rhythm, no murmur or JVD.  S1 + S2 noted, no S3 or S4.  LUNGS:  Clear to auscultation bilaterally without rales/rhonchi/wheezing/retractions.  Symmetric chest rise on inhalation noted.  ABD:  Active bowel sounds, soft, non-tender/non-distended.  No rebound/guarding/rigidity.  EXT:  No edema or cyanosis.  No joint synovitis noted.  SKIN:  Dry to touch, no exanthems noted in the visualized areas.         Medical Decision Making     35 MINUTES SPENT BY ME on the date of service doing chart review, history, exam, documentation & further activities per the note.      Data     I have personally reviewed the following data over the past 24 hrs:    11.0  \   8.9 (L)   / 280     135 102 48.8 (H) /  87   4.4 26 1.27 (H) \

## 2023-12-15 NOTE — PROGRESS NOTES
Notified provider about indwelling russo catheter discussed removal or continued need.    Did provider choose to remove indwelling russo catheter? NO    Provider's russo indication for keeping indwelling russo catheter: Indication for continued use: End of Live, retention    Is there an order for indwelling russo catheter? YES    *If there is a plan to keep russo catheter in place at discharge daily notification with provider is not necessary, but please add a notation in the treatment team sticky note that the patient will be discharging with the catheter.

## 2023-12-15 NOTE — PROGRESS NOTES
Physical Therapy Discharge Summary    Reason for therapy discharge:    Family has decided to focus on comfort care for pt with plan to discharge to LTC with hospice    Progress towards therapy goal(s). See goals on Care Plan in Epic electronic health record for goal details.  Goals not met.  Barriers to achieving goals:   limited tolerance for therapy.    Therapy recommendation(s):    No further therapy is recommended.

## 2023-12-15 NOTE — PLAN OF CARE
Goal Outcome Evaluation:               Pt restless off and on overnight. Tylenol given with some relief of restlessness. Pt with no void, last bladder scan 212. Pt was last cath at 2200. Awaiting LTC placement for hospice.

## 2023-12-16 NOTE — PLAN OF CARE
Goal Outcome Evaluation:      Plan of Care Reviewed With: child    Overall Patient Progress: no changeOverall Patient Progress: no change    Patient vital signs are at baseline: Yes  Patient able to ambulate as they were prior to admission or with assist devices provided by therapies during their stay:  No,  Reason:  Unable to ambulate   Patient MUST void prior to discharge:  No,  Reason:  Not voiding, russo catheter placed this shift  Patient able to tolerate oral intake:  No,  Reason:  Minimal PO intake  Pain has adequate pain control using Oral analgesics:  Yes, PRN tylenol given      Not alert to self, hx dementia. Restless in am, pt removed aquacel dressing from r hip, new dressing placed. Tylenol given. Pt not voiding, russo placed, will be discharged with russo. When awake, pt constantly pulling at lines, removed IV, peeled stat lock from leg. Provider paged to place order for mitts, daughters at bedside were agreeable to this intervention. Mitts placed on pt at 1700 when family left bedside. Pt successfully removed mitts, mitts placed back on. Pt will be discharged to LTC on hospice on Monday @ 1000.

## 2023-12-16 NOTE — PROGRESS NOTES
Patient seen and examined by me at bedside.  Mel is confused and unable to make any decision.  I spoke with her daughter Tanya who is healthcare agent and okay with blood transfusion for hemoglobin of 6.5.

## 2023-12-16 NOTE — PROGRESS NOTES
[facility]  Hospitalist Progress Note  Renzo Garcia M.D., M.B.A.   12/16/2023    Reason for Stay/active problem list    Unwitnessed fall with right femoral neck fracture, acetabular pubic ramus fracture status postoperative reduction internal fixation  Severe acute postoperative blood loss anemia         Assessment and Plan:        Summary of Stay: Mel Lazaro is a 89 year old female with Pmhx of CKD III, former tobacco dependence with possible COPD, dementia, HTN, hypothyroidism, who was admitted on 12/10/2023 after an unwitnessed fall presented with right hip pain. Found to have right femoral neck, acetabular and pubic rami fractures. Admitted for further cares with Orthopedic surgery consultation.        Problem List with Assessment and Plan:  Fall, unwitnessed   Right femoral neck s/p ORIF 12/11  Acetabular and pubic rami fractures  Right Hip Effusion   --Fell afternoon of 12/9, unwitnessed. Independent with ambulation at baseline. No preceding illness or known presyncopal symptoms prior to fall. She has assistance from her daughter's who take shifts caring for her in the home, when they are not there they have cameras to various points in the home for monitoring.  Brought in for right hip pain, difficulty mobilizing. Found to have right femoral neck, acetabular and right pubic rami fractures. Admitted for further cares with Orthopedic surgery consultation.   No hx of previous adverse issues with anesthesia, no known cardiopulmonary disease.   -Ortho consulted, surgery completed   -Postoperative course was complicated by acute blood loss anemia for which blood transfusion was required.     Leukocytosis.  Unclear cause.  Procalcitonin 1.75.  WBC normal. Recheck tomorrow.    UA with micro reflex NEGATIVE  Afebrile     Subacute L2 Compression Fracture   On imaging noted age indeterminate  superior endplate L2 compression fracture with 25% vertebral body height loss on CT Lumbar spine.  Currently main area of pain right hip and lower extremity rather than back.  NCHCT, CT C spine negative.   History of previous back pain treated supportively, had imaging that was negative at outside orthopedics per family.   - monitor for back pain  - analgesia PRN      HETAL on CKD 3  Lab work with borderline mild hyponatremia, Cr 1.43 with GFR of 34. Last Cr 1.77 near baseline of ~1.6-1.8 when checked 2 years ago         Creatinine   Date Value Ref Range Status   12/14/2023 1.27 (H) 0.51 - 0.95 mg/dL Final   06/06/2017 1.00 0.52 - 1.04 mg/dL Final      Improved  after NS bolus     -Encourage oral intake  -avoid nsaids for pain      Lack of dentition  -soft diet, aspiration precautions      Former tobacco dependence - no formal diagnosis of COPD but suspected in previous hospitalizations. No hypoxia or respiratory symptoms.      Alzheimer's disease: on aricept and zolofr. Pt still lives independently with rotating supervision from family.      Hypothyroidism:  Resumed synthroid     HLD:  Resumed zocor     Hyponatremia.  Corrected.     Acute postoperative blood loss anemia  -- Hemoglobin was 6.5 morning of December 16 and repeat came back at 6.4.  No evidence of external bleeding.  Hemodynamically stable  -- 1 unit of blood transfusion was ordered    Goals of care: Comfort oriented care but not comfort measures only at this time.  Discharge with hospice    VTE Prophylaxis: Defer to primary service  Code Status: No CPR- Do NOT Intubate  Diet: Regular Diet Adult    Garza Catheter: PRESENT, indication: Retention;End of Life    Family updated today: Yes      Disposition: Likely long-term care on hospice in the next 1 or 2 days          Interval History (Subjective):        Patient is seen and examined by me today and medical record reviewed.Overnight events noted and care discussed with nursing staff.  Patient has dementia and remained confused.  She was found to have low hemoglobin and I spoke with multiple family  "members who are okay with blood transfusion.        Physical Exam:        Last Vital Signs:  BP (!) 140/54   Pulse 77   Temp 97.2  F (36.2  C) (Temporal)   Resp 16   SpO2 99%     I/O last 3 completed shifts:  In: -   Out: 550 [Urine:550]    Wt Readings from Last 5 Encounters:   06/06/17 58.4 kg (128 lb 12.8 oz)        Constitutional: Awake, alert, cooperative, no apparent distress     Respiratory: Clear to auscultation bilaterally, no crackles or wheezing   Cardiovascular: Regular rate and rhythm, normal S1 and S2, and no murmur noted   Abdomen: Normal bowel sounds, soft, non-distended, non-tender   Skin: No new rashes, no cyanosis, dry to touch   Neuro: Alert with  no new focal weakness   Extremities: No edema   Other(s):        All other systems: Negative          Medications:        All current medications were reviewed with changes reflected in problem list.         Data:      All new lab and imaging data was reviewed.      Data reviewed today: I reviewed all new labs and imaging results over the last 24 hours. I personally reviewed       Recent Labs   Lab 12/16/23 0823 12/16/23  0711 12/14/23  0844 12/13/23  0646   WBC  --  14.8* 11.0 12.2*   HGB 6.4* 6.5* 8.9* 10.0*   HCT  --  20.7* 28.6* 31.9*   MCV  --  96 96 96   PLT  --  298 280 242     No results for input(s): \"CULT\" in the last 168 hours.  Recent Labs   Lab 12/16/23  0711 12/14/23  0844 12/13/23  0646    135 133*   POTASSIUM 4.4 4.4 4.8   CHLORIDE 103 102 101   CO2 25 26 26   ANIONGAP 9 7 6*   * 87 88   BUN 52.9* 48.8* 42.7*   CR 1.14* 1.27* 1.52*   GFRESTIMATED 44* 39* 31*   JOE 8.0* 7.8* 7.9*       Recent Labs   Lab 12/16/23  0711 12/14/23  0844 12/13/23  0646 12/13/23  0525 12/12/23  0705   * 87 88 89 100*       No results for input(s): \"INR\" in the last 168 hours.      No results for input(s): \"TROPONIN\", \"TROPI\", \"TROPR\" in the last 168 hours.    Invalid input(s): \"TROP\", \"TROPONINIES\"    No results found for this or any " previous visit (from the past 48 hour(s)).    COVID Status:  COVID-19 PCR Results           No data to display              COVID-19 Antibody Results, Testing for Immunity           No data to display                 Disclaimer: This note consists of symbols derived from keyboarding, dictation and/or voice recognition software. As a result, there may be errors in the script that have gone undetected. Please consider this when interpreting information found in this chart.

## 2023-12-16 NOTE — PROGRESS NOTES
DATE/TIME OF CALL RECEIVED FROM LAB:  12/16/23 at 7:48 AM   LAB TEST:  Hemoglobin   LAB VALUE:  6.5  PROVIDER NOTIFIED?: Yes  PROVIDER NAME: Abdissa    DATE/TIME LAB VALUE REPORTED TO PROVIDER: 0748  MECHANISM OF PROVIDER NOTIFICATION: Page  PROVIDER RESPONSE: Yes

## 2023-12-16 NOTE — PLAN OF CARE
Goal Outcome Evaluation:       Patient vital signs are at baseline: Yes  Patient able to ambulate as they were prior to admission or with assist devices provided by therapies during their stay:  No,  Reason:  A2, lift   Patient MUST void prior to discharge:  No, retention, Garza in place, end of life care  Patient able to tolerate oral intake:  Yes  Pain has adequate pain control using Oral analgesics:  Yes  Does patient have an identified :  Yes  Has goal D/C date and time been discussed with patient:  Yes          Pt confused. Up with A2, lift. Did not get OOB. Critical hemoglobin level 6.4. transfused 1 unit blood without complications. Daughter was at bedside. Aquacel dressing CDI. CMS intact. Given tylenol for pain. Appetite poor. Garza patent, 450cc output. Pt discharging Monday at 10AM to [facility]

## 2023-12-16 NOTE — PLAN OF CARE
Goal Outcome Evaluation:           Overall Patient Progress: no changeOverall Patient Progress: no change       Alert to self. Ax2 lift. Garza in place. Pt is able to remove mitts, placed back on pt.  Dressing changed and CDI. Plan to discharge Monday on hospice, will continue with plan of care.

## 2023-12-16 NOTE — PROGRESS NOTES
Medicine Progress Note - Hospitalist Service    Date of Admission:  12/10/2023    Assessment & Plan   Mel Lazaro is a 89 year old female with Pmhx of CKD III, former tobacco dependence with possible COPD, dementia, HTN, hypothyroidism, who was admitted on 12/10/2023 after an unwitnessed fall presented with right hip pain. Found to have right femoral neck, acetabular and pubic rami fractures. Admitted for further cares with Orthopedic surgery consultation.         Fall, unwitnessed   Right femoral neck s/p ORIF 12/11  Acetabular and pubic rami fractures  Right Hip Effusion   Fell afternoon of 12/9, unwitnessed. Independent with ambulation at baseline. No preceding illness or known presyncopal symptoms prior to fall. She has assistance from her daughter's who take shifts caring for her in the home, when they are not there they have cameras to various points in the home for monitoring.  Brought in for right hip pain, difficulty mobilizing. Found to have right femoral neck, acetabular and right pubic rami fractures. Admitted for further cares with Orthopedic surgery consultation.   No hx of previous adverse issues with anesthesia, no known cardiopulmonary disease.    -Inpatient   No rhabdo. She was not down unassisted for long, repeat UA negative and CK WNL  -Ortho consulted, surgery completed   -Diet as tolerated  -analgesia PRN  -bedrest, therapy and SW consulted. Anticipate LTCF.     Leukocytosis.  Unclear cause.  Procalcitonin 1.75.  WBC normal. Recheck tomorrow.    UA with micro reflex NEGATIVE  Afebrile     Subacute L2 Compression Fracture   On imaging noted age indeterminate  superior endplate L2 compression fracture with 25% vertebral body height loss on CT Lumbar spine. Currently main area of pain right hip and lower extremity rather than back.  NCHCT, CT C spine negative.   History of previous back pain treated supportively, had imaging that was negative at outside  orthopedics per family.   - monitor for back pain  - analgesia PRN      HETAL on CKD 3  Lab work with borderline mild hyponatremia, Cr 1.43 with GFR of 34. Last Cr 1.77 near baseline of ~1.6-1.8 when checked 2 years ago   Creatinine   Date Value Ref Range Status   12/14/2023 1.27 (H) 0.51 - 0.95 mg/dL Final   06/06/2017 1.00 0.52 - 1.04 mg/dL Final     Improving after NS bolus    -Encourage oral intake  -avoid nsaids for pain      Lack of dentition  -soft diet, aspiration precautions      Former tobacco dependence - no formal diagnosis of COPD but suspected in previous hospitalizations. No hypoxia or respiratory symptoms.      Alzheimer's disease: on aricept and zolofr. Pt still lives independently with rotating supervision from family.      Hypothyroidism:  Resumed synthroid     HLD:  Resumed zocor    Hyponatremia.  Corrected.           Diet: Regular Diet Adult    DVT Prophylaxis: Defer to primary service  Garza Catheter: PRESENT, indication: Retention;End of Life  Lines: None     Cardiac Monitoring: None  Code Status: No CPR- Do NOT Intubate      Clinically Significant Risk Factors                                    Disposition Plan      Expected Discharge Date: 12/18/2023,  9:00 AM    Destination: nursing home  Discharge Comments: LTC with hospice            Rey Desai MD  Hospitalist Service  [facility]  Securely message with [employer] (more info)  Text page via [employer] Paging/Directory   ______________________________________________________________________    Interval History   Meeting with Palliative to set goals of care. She is DNR/DNI now. Plan to go to LTCF and lately hospice per family decision, which is appropriated and realistic. Recheck UA, PCT in AM. Hydration overnight.     Physical Exam   Vital Signs: Temp: 97.9  F (36.6  C) Temp src: Temporal BP: 127/60 Pulse: 83   Resp: 20 SpO2: 94 % O2 Device: None (Room air)    Weight: 0 lbs 0 oz    GEN: [place], somnolent, appears  comfortable, NAD.  HEENT:  Normocephalic/atraumatic, no scleral icterus, no nasal discharge, mouth moist.  CV:  Regular rate and rhythm, no murmur or JVD.  S1 + S2 noted, no S3 or S4.  LUNGS:  Clear to auscultation bilaterally without rales/rhonchi/wheezing/retractions.  Symmetric chest rise on inhalation noted.  ABD:  Active bowel sounds, soft, non-tender/non-distended.  No rebound/guarding/rigidity.  EXT:  No edema or cyanosis.  No joint synovitis noted.  SKIN:  Dry to touch, no exanthems noted in the visualized areas.         Medical Decision Making     34 MINUTES SPENT BY ME on the date of service doing chart review, history, exam, documentation & further activities per the note.      Data

## 2023-12-17 NOTE — PLAN OF CARE
Pt confused this shift, removed IV.  On-call provider notified and okayed to leave IV out.  Pt removing clothing frequently.  Assessment did not yield anything else of note.  Plan is for possible discharge with hospice in near future.

## 2023-12-17 NOTE — PROGRESS NOTES
[facility]  Hospitalist Progress Note  Renzo Garcia M.D., M.B.A.   12/17/2023    Reason for Stay/active problem list    Unwitnessed fall with right femoral neck fracture, acetabular pubic ramus fracture status postoperative reduction internal fixation  Severe acute postoperative blood loss anemia  Comfort measures only         Assessment and Plan:        Summary of Stay: Mel Lazaro is a 89 year old female with Pmhx of CKD III, former tobacco dependence with possible COPD, dementia, HTN, hypothyroidism, who was admitted on 12/10/2023 after an unwitnessed fall presented with right hip pain. Found to have right femoral neck, acetabular and pubic rami fractures. Admitted for further cares with Orthopedic surgery consultation.        Problem List with Assessment and Plan:  Fall, unwitnessed   Right femoral neck s/p ORIF 12/11  Acetabular and pubic rami fractures  Right Hip Effusion   --Fell afternoon of 12/9, unwitnessed. Independent with ambulation at baseline. No preceding illness or known presyncopal symptoms prior to fall. She has assistance from her daughter's who take shifts caring for her in the home, when they are not there they have cameras to various points in the home for monitoring.  Brought in for right hip pain, difficulty mobilizing. Found to have right femoral neck, acetabular and right pubic rami fractures. Admitted for further cares with Orthopedic surgery consultation.   No hx of previous adverse issues with anesthesia, no known cardiopulmonary disease.   -Ortho consulted, surgery completed   -Postoperative course was complicated by acute blood loss anemia for which blood transfusion was required.     Leukocytosis.  Unclear cause.  Procalcitonin 1.75.  WBC normal. Recheck tomorrow.    UA with micro reflex NEGATIVE  Afebrile     Subacute L2 Compression Fracture   On imaging noted age indeterminate  superior endplate L2 compression fracture with 25% vertebral body height loss  Called patient to see if she has any questions prior to surgery. Patient aware she will be on a clear liquid diet the day prior and will be NPO at [facility]. She is aware her bowel prep and abx will be taken the day prior as well.     Patient is questioning what medications she should hold the day of surgery. Advised her that the pre-admission test nurse should be calling her to go over everything. Provided patient with pre-admission test center's number in case she does not hear from them prior to surgery.     Patient verbalized understanding. No further questions at this time.    on CT Lumbar spine. Currently main area of pain right hip and lower extremity rather than back.  NCHCT, CT C spine negative.   History of previous back pain treated supportively, had imaging that was negative at outside orthopedics per family.   - monitor for back pain  - analgesia PRN      HETAL on CKD 3  Lab work with borderline mild hyponatremia, Cr 1.43 with GFR of 34. Last Cr 1.77 near baseline of ~1.6-1.8 when checked 2 years ago         Creatinine   Date Value Ref Range Status   12/14/2023 1.27 (H) 0.51 - 0.95 mg/dL Final   06/06/2017 1.00 0.52 - 1.04 mg/dL Final      Improved  after NS bolus     -Encourage oral intake  -avoid nsaids for pain      Lack of dentition  -soft diet, aspiration precautions      Former tobacco dependence - no formal diagnosis of COPD but suspected in previous hospitalizations. No hypoxia or respiratory symptoms.      Alzheimer's disease: on aricept and zolofr. Pt still lives independently with rotating supervision from family.      Hypothyroidism:  Resumed synthroid     HLD:  Resumed zocor     Hyponatremia.  Corrected.     Acute postoperative blood loss anemia  -- Hemoglobin was 6.5 morning of December 16 and repeat came back at 6.4.  No evidence of external bleeding.  Hemodynamically stable  -- 1 unit of blood transfusion was given    Goals of care: Mel is doing very well and appears comfortable.  I spoke with patient's daughter Hemalatha at bedside.  Hemalatha and her sisters would like to keep her mother comfortable.  I discussed end-of-life care including comfort measures only protocol.  Hemalatha would like to keep her comfort measures only goal.  Comfort measures only orders placed and bedside nurse notified.    VTE Prophylaxis: Defer to primary service  Code Status: No CPR- Do NOT Intubate  Diet: Regular Diet Adult  Snacks/Supplements Pediatric: Ensure Clear; Between Meals    Garza Catheter: PRESENT, indication: Retention;End of Life    Family updated today: Yes      Disposition:  "Hospice care facility tomorrow          Interval History (Subjective):        Patient is seen and examined by me today and medical record reviewed.Overnight events noted and care discussed with nursing staff.  Patient remains somnolent but appears comfortable.  I spoke with patient's daughter Hemalatha and discussed at length about patient's overall prognosis and goals of care.  Currently would like to continue comfort measures only care.  I discussed care plan with bedside nurse as well.       Physical Exam:        Last Vital Signs:  /57 (BP Location: Left arm)   Pulse 80   Temp 97.1  F (36.2  C) (Temporal)   Resp 20   SpO2 91%     I/O last 3 completed shifts:  In: 380 [P.O.:80]  Out: 725 [Urine:725]    Wt Readings from Last 5 Encounters:   06/06/17 58.4 kg (128 lb 12.8 oz)        Constitutional: Awake, alert, cooperative, no apparent distress     Respiratory: Clear to auscultation bilaterally, no crackles or wheezing   Cardiovascular: Regular rate and rhythm, normal S1 and S2, and no murmur noted   Abdomen: Normal bowel sounds, soft, non-distended, non-tender   Skin: No new rashes, no cyanosis, dry to touch   Neuro: Alert with  no new focal weakness   Extremities: No edema   Other(s):        All other systems: Negative          Medications:        All current medications were reviewed with changes reflected in problem list.         Data:      All new lab and imaging data was reviewed.      Data reviewed today: I reviewed all new labs and imaging results over the last 24 hours. I personally reviewed       Recent Labs   Lab 12/16/23  0823 12/16/23  0711 12/14/23  0844 12/13/23  0646   WBC  --  14.8* 11.0 12.2*   HGB 6.4* 6.5* 8.9* 10.0*   HCT  --  20.7* 28.6* 31.9*   MCV  --  96 96 96   PLT  --  298 280 242     No results for input(s): \"CULT\" in the last 168 hours.  Recent Labs   Lab 12/16/23  0711 12/14/23  0844 12/13/23  0646    135 133*   POTASSIUM 4.4 4.4 4.8   CHLORIDE 103 102 101   CO2 25 26 26 " "  ANIONGAP 9 7 6*   * 87 88   BUN 52.9* 48.8* 42.7*   CR 1.14* 1.27* 1.52*   GFRESTIMATED 44* 39* 31*   JOE 8.0* 7.8* 7.9*       Recent Labs   Lab 12/16/23  0711 12/14/23  0844 12/13/23  0646 12/13/23  0525 12/12/23  0705   * 87 88 89 100*       No results for input(s): \"INR\" in the last 168 hours.      No results for input(s): \"TROPONIN\", \"TROPI\", \"TROPR\" in the last 168 hours.    Invalid input(s): \"TROP\", \"TROPONINIES\"    No results found for this or any previous visit (from the past 48 hour(s)).    COVID Status:  COVID-19 PCR Results           No data to display              COVID-19 Antibody Results, Testing for Immunity           No data to display                 Disclaimer: This note consists of symbols derived from keyboarding, dictation and/or voice recognition software. As a result, there may be errors in the script that have gone undetected. Please consider this when interpreting information found in this chart.    "

## 2023-12-17 NOTE — PLAN OF CARE
Goal Outcome Evaluation:    Pt placed on comfort cares. Garza intact, draining clear/yellow urine. Senna and miralax given. PRN tylenol given. Plan for discharge to LTC on hospice tomorrow.

## 2023-12-17 NOTE — PLAN OF CARE
Goal Outcome Evaluation:      Plan of Care Reviewed With: child    3pm-11pm RN    Patient VS are at baseline: Yes  Patient able to ambulate as they were prior to admission or with assist devices provided by therapy during their stay: No needs a lift for transfers  Patient must void prior to discharge: No has a russo catheter  Patient able to tolerate oral intake: Yes  Patient has adequate pain control using oral analgesics: Yes    Patient disoriented x4, restless sometimes pulling on IV and dressing on the hip needing a lot of redirection. Tylenol used for pain management. Dressing changed on hip because she took the old dressing off. Received blood this afternoon with no complications. Plan is to discharge on Monday to LTC on hospice.

## 2023-12-18 NOTE — PLAN OF CARE
Goal Outcome Evaluation:      Plan of Care Reviewed With: child    Overall Patient Progress: decliningOverall Patient Progress: declining    Patient on comfort cares, sleeping between cares. Was repositioned X3, no s/s of pain noted, russo catheter in place. No oral medications given because patient was lethargic and unable to swallow. Plan is to discharge tomorrow on hospice care.

## 2023-12-18 NOTE — PROGRESS NOTES
PALLIATIVE CARE PROGRESS NOTE  [facility]     Patient Name: Mel Lazaro  Date of Admission: 12/10/2023   Today the patient was seen for: Family emotional support      Recommendations & Counseling     GOALS OF CARE:   Life-prolonging with limits - Family plans to enroll Mel in Hospice with dismissal to LTC.      ADVANCE CARE PLANNING:  No health care directive on file. Per  informed consent policy, next of kin should be involved if patient becomes unable.  POLST form completed 12/14/2023 is found in [facility] docs.   Code status: No CPR- Do NOT Intubate     MEDICAL MANAGEMENT:      #Pain,acute   [place] Board of Pharmacy Data Base Reviewed: Yes:   reviewed - no record of controlled substances prescribed.  Opioids: Patient's opioid use in past 24 hours: 1 mg Hydromorphone = 4 mg Daily Morphine Equivalent   Continue hydromorphone (Dilaudid) PRN  Acetaminophen (Tylenol), PRN  NSAIDs:   Not recommended due to advanced age  Other medicines for pain: Lido derm and Menthol Patches for pain  Ice and Physical Therapy      #General Weakness/Debility   Appreciate Physical Therapy recommendations  Family plans to have Mel dismiss to LTC with support of hospice     PSYCHOSOCIAL/SPIRITUAL SUPPORT:  Family 3 daughters (Tanya, Carlita, and Hemalatha) are supportive. Son who lives in [place] is not involved in cares    [place] community: [facility]        Palliative Care will continue to follow. Thank you for the consult and allowing us to aid in the care of Mel Lazaro.    These recommendations have been discussed with Hospitalist Renzo Garcia MD; , CORNELIO Hinojosa; and bedside nurse OLU Benoit.    MORENA Dougherty CNS  Securely message with [employer] (more info)  Text page via [facility] Paging/Directory          Interval History:     Multidisciplinary collaboration:  Appreciate input of nursing staff and social work.    Patient/family narrative  Met with the patient's daughters, Tanya and  "Carlita at bedside. Carlita acknowledged \"She's just going down hill. Sleeping most of the time. I knew when she came here last Sunday this was probably the end. I hate to say it, but this is her gift to us.\" Patient sleeping peacefully in bed. Daughter's acknowledged dismissal plan to SNF in [place] with support of Hospital.      Palliative Summary/HPI          Mel Lazaro is a 89 year old female admitted 12/10/2023 for complaint of right hip pain and found to have right femoral neck, acetabular and right pubic rami fractures. The patient has history of advanced dementia and has resided independently at home with the support of her three daughters. She feel on the afternoon of 12/9. The patient underwent right closed reduction and internal fixation of right femoral neck fracture on 12/11/2023. The patient's medical history is significant for CKD III, dementia, hypertension, hypothyroidism, dementia and probable COPD given her history of tobacco dependence.   Patient ready for dismissal to [place] SNF on hospice 12/18/2023.                  Review of Systems:     Besides above, an additional  system ROS was reviewed and is unremarkable               Physical Exam:      0 lbs 0 oz    Physical Exam  Elderly  female sleeping peacefully respirations are regular at 18.                Current Problem List:   Principal Problem:    Fall, initial encounter  Active Problems:    Multiple closed fractures of pelvis without disruption of pelvic ring, initial encounter (H)    Closed fracture of neck of right femur, initial encounter (H)      No Known Allergies         Data Reviewed:     No results found for this or any previous visit (from the past 24 hour(s)).      MANAGEMENT DISCUSSED with the following over the past 24 hours: Hospitalist Renzo Garcia MD and bedside nurse OLU Benoit   9:50 AM until 10:10 AM - 20 MINUTES SPENT BY ME on the date of service doing chart review, history, exam, documentation & " further activities per the note.

## 2023-12-18 NOTE — PROGRESS NOTES
Patient's After Visit Summary was reviewed with patient and/or .   Patient verbalized understanding of After Visit Summary, recommended follow up and was given an opportunity to ask questions.   Discharge medications sent home with patient/family: YES,   Discharged with transport tech

## 2023-12-18 NOTE — PLAN OF CARE
Goal Outcome Evaluation:    Comfort cares. Lethargic most of the night. Repositioned, russo in place, patent. Some agitation and anxiety at 0300, removed dressing, sublingual ativan given. Will continue to monitor.

## 2023-12-18 NOTE — PROGRESS NOTES
Care Management Discharge Note    Discharge Date: 12/18/2023       Discharge Disposition: Hospice, SNF    Discharge Services:      Discharge DME:      Discharge Transportation: agency    Private pay costs discussed: transportation costs    Does the patient's insurance plan have a 3 day qualifying hospital stay waiver?  No    PAS Confirmation Code: [id number]  Patient/family educated on Medicare website which has current facility and service quality ratings: yes    Education Provided on the Discharge Plan: Yes  Persons Notified of Discharge Plans: daughter, hospice, and SNF  Patient/Family in Agreement with the Plan: yes    Additional Information:  Pt discharging to [facility] via community stretcher transport. Orders sent to [facility] and SNF.     CHARLES Mitchell, [facility]  Inpatient Care Coordination  [facility]  [phone]

## 2023-12-19 NOTE — PROGRESS NOTES
[facility]    Background: Transitional Care Management program identified per system criteria and reviewed by [facility] team for possible outreach.    Assessment: Upon chart review, [facility] Team member will not proceed with patient outreach related to this episode of Transitional Care Management program due to reason below:    Patient has been discharged with Hospice Care    Plan: Transitional Care Management episode addressed appropriately per reason noted above.      Bev Hoff MA  [facility], [facility]    *Connected Care Resource Team does NOT follow patient ongoing. Referrals are identified based on internal discharge reports and the outreach is to ensure patient has an understanding of their discharge instructions.

## 2023-12-22 NOTE — DISCHARGE SUMMARY
Physician Discharge Summary           [facility]  Hospitalist Discharge Summary-[facility]    Name: Mel Lazaro    MRN: [id number]     YOB: 1929    Age: 89 year old                                                     Primary care provider: Lokesh Guadarrama    Admit date:  12/10/2023    Discharge date and time: 12/18/2023 10:17 AM    Discharge Physician: Renzo Garcai M.D., M.B.A.       Primary Discharge Diagnosis      Unwitnessed fall with right femoral neck fracture, acetabular pubic ramus fracture status postoperative reduction internal fixation  Severe acute postoperative blood loss anemia  Comfort measures only      Secondary Diagnosis /chronic medical conditions     Past Medical History:   Diagnosis Date    Anxiety     Hypertension      Past Surgical History:  Past Surgical History:   Procedure Laterality Date    CLOSED REDUCTION, PERCUTANEOUS PINNING HIP Right 12/11/2023    Procedure: Closed reduction and internal fixation of right femoral neck fracture;  Surgeon: Santo Deng MD;  Location:  OR           Brief Summary of Hospital stay :       Please refer to  Admission H&P note  and subsequent progress notes in EMR for full details of patient care.    Reason for Hospitalization(C/C,HPI and brief patient summary): right hip pain       Significant findings(Primary diagnosis )Procedures and treatments provided(Hospital course ,consults, procedures):Please see below for details  Mel Lazaro is a 89 year old female with Pmhx of CKD III, former tobacco dependence with possible COPD, dementia, HTN, hypothyroidism, who was admitted on 12/10/2023 after an unwitnessed fall presented with right hip pain. Found to have right femoral neck, acetabular and pubic rami fractures. Admitted for further cares with Orthopedic surgery consultation.         Problem list (medical problems addressed during hospital stay):    Fall, unwitnessed   Right femoral neck s/p ORIF  12/11  Acetabular and pubic rami fractures  Right Hip Effusion   --Fell afternoon of 12/9, unwitnessed. Independent with ambulation at baseline. No preceding illness or known presyncopal symptoms prior to fall. She has assistance from her daughter's who take shifts caring for her in the home, when they are not there they have cameras to various points in the home for monitoring.  Brought in for right hip pain, difficulty mobilizing. Found to have right femoral neck, acetabular and right pubic rami fractures. Admitted for further cares with Orthopedic surgery consultation.   No hx of previous adverse issues with anesthesia, no known cardiopulmonary disease.   -Ortho consulted, surgery completed   -Postoperative course was complicated by acute blood loss anemia for which blood transfusion was required.     Leukocytosis.  Unclear cause.  Procalcitonin 1.75.  WBC normal. Recheck tomorrow.    UA with micro reflex NEGATIVE  Afebrile     Subacute L2 Compression Fracture   On imaging noted age indeterminate  superior endplate L2 compression fracture with 25% vertebral body height loss on CT Lumbar spine. Currently main area of pain right hip and lower extremity rather than back.  NCHCT, CT C spine negative.   History of previous back pain treated supportively, had imaging that was negative at outside orthopedics per family.   - monitor for back pain  - analgesia PRN      HETAL on CKD 3  Lab work with borderline mild hyponatremia, Cr 1.43 with GFR of 34. Last Cr 1.77 near baseline of ~1.6-1.8 when checked 2 years ago             Creatinine   Date Value Ref Range Status   12/14/2023 1.27 (H) 0.51 - 0.95 mg/dL Final   06/06/2017 1.00 0.52 - 1.04 mg/dL Final      Improved  after NS bolus     -Encourage oral intake  -avoid nsaids for pain      Lack of dentition  -soft diet, aspiration precautions      Former tobacco dependence - no formal diagnosis of COPD but suspected in previous hospitalizations. No hypoxia or respiratory  symptoms.      Alzheimer's disease: on aricept and zolofr. Pt still lives independently with rotating supervision from family.      Hypothyroidism:  Resumed synthroid     HLD:  Resumed zocor     Hyponatremia.  Corrected.      Acute postoperative blood loss anemia  -- Hemoglobin was 6.5 morning of December 16 and repeat came back at 6.4.  No evidence of external bleeding.  Hemodynamically stable  -- 1 unit of blood transfusion was given       Goals of care:  Comfort measures only       Consultations during hospital stay:       ORTHOPEDIC SURGERY IP CONSULT  PHYSICAL THERAPY ADULT IP CONSULT  OCCUPATIONAL THERAPY ADULT IP CONSULT  SOCIAL WORK IP CONSULT  PHYSICAL THERAPY ADULT IP CONSULT  OCCUPATIONAL THERAPY ADULT IP CONSULT  CARE MANAGEMENT / SOCIAL WORK IP CONSULT  PALLIATIVE CARE ADULT IP CONSULT  SPIRITUAL HEALTH SERVICES IP CONSULT      Patient discharge Condition:     stable    /57 (BP Location: Left arm)   Pulse 80   Temp 97.1  F (36.2  C) (Temporal)   Resp 20   SpO2 91%      Discharge Instructions:       Patient/family instructions: Written discharge instruction given to patient/family    Discharge Medications:       Review of your medicines        START taking        Dose / Directions   acetaminophen 650 MG suppository  Commonly known as: TYLENOL  Used for: Closed fracture of neck of right femur, initial encounter (H)      Dose: 650 mg  Place 1 suppository (650 mg) rectally every 4 hours as needed for fever  Quantity: 4 suppository  Refills: 0     atropine 1 % ophthalmic solution  Used for: Closed fracture of neck of right femur, initial encounter (H)      Dose: 2 drop  Take 2 drops by mouth, place under tongue or place inside cheek every 4 hours as needed for secretions  Quantity: 5 mL  Refills: 0     bisacodyl 10 MG suppository  Commonly known as: DULCOLAX  Used for: Closed fracture of neck of right femur, initial encounter (H)      Dose: 10 mg  Place 1 suppository (10 mg) rectally daily as  needed for constipation  Quantity: 2 suppository  Refills: 0     HYDROmorphone 2 MG tablet  Commonly known as: DILAUDID  Used for: Closed fracture of neck of right femur, initial encounter (H)      Dose: 1 mg  Take 0.5 tablets (1 mg) by mouth or place under tongue every 2 hours as needed for pain or shortness of breath Hospice patient. Dispense in quantities of 30 tablets.  Quantity: 30 tablet  Refills: 0     LORazepam 0.5 MG tablet  Commonly known as: ATIVAN  Used for: Closed fracture of neck of right femur, initial encounter (H)      Dose: 0.25 mg  Take 0.5 tablets (0.25 mg) by mouth or place under tongue every 4 hours as needed for anxiety (restlessness)  Quantity: 30 tablet  Refills: 0     OLANZapine zydis 5 MG ODT  Commonly known as: zyPREXA  Used for: Hospice care patient      Dose: 5 mg  Take 1 tablet (5 mg) by mouth every 6 hours as needed for agitation (delirium)  Quantity: 20 tablet  Refills: 0     ondansetron 4 MG ODT tab  Commonly known as: ZOFRAN ODT  Used for: Hospice care patient      Dose: 4 mg  Take 1 tablet (4 mg) by mouth every 6 hours as needed for nausea or vomiting  Quantity: 10 tablet  Refills: 0            CONTINUE these medicines which have NOT CHANGED        Dose / Directions   MELATONIN PO      Dose: 1 tablet  Take 1 tablet by mouth nightly as needed for sleep  Refills: 0            STOP taking      senna-docusate 8.6-50 MG tablet  Commonly known as: SENOKOT-S/PERICOLACE                  Where to get your medicines        These medications were sent to [facility] [id number] [facility]  [address] [facility] [address]      Phone: [phone]   acetaminophen 650 MG suppository  atropine 1 % ophthalmic solution  bisacodyl 10 MG suppository  HYDROmorphone 2 MG tablet  LORazepam 0.5 MG tablet  OLANZapine zydis 5 MG ODT  ondansetron 4 MG ODT tab          Discharge diet:Orders Placed This Encounter      Diet    regular diet      Discharge  activity:Activity as tolerated      Discharge follow-up: hospice       Major procedure performed/  Significant Diagnostic Studies:         Results for orders placed or performed during the hospital encounter of 12/10/23   CT Head w/o Contrast    Narrative    EXAM: CT HEAD W/O CONTRAST  LOCATION: [facility]  DATE: 12/10/2023    INDICATION: trauma  COMPARISON: None.  TECHNIQUE: Routine CT Head without IV contrast. Multiplanar reformats. Dose reduction techniques were used.    FINDINGS:  INTRACRANIAL CONTENTS: No evidence of acute intracranial hemorrhage or mass effect. Scattered foci of decreased attenuation within the cerebral hemispheric white matter which are nonspecific, though most commonly ascribed to chronic small vessel ischemic   disease. The ventricles and sulci are prominent consistent with moderate brain parenchymal volume loss. Gray-white matter differentiation is maintained. The basilar cisterns are patent.    VISUALIZED ORBITS/SINUSES/MASTOIDS: The globes are unremarkable. The partially imaged paranasal sinuses, mastoid air cells and middle ear cavities are unremarkable.     BONES/SOFT TISSUES: The visualized skull base and calvarium are unremarkable.      Impression    IMPRESSION:    1.  No evidence of acute intracranial hemorrhage or mass effect.  2.  Moderate nonspecific white matter changes.  3.  Moderate brain parenchymal volume loss.   CT Cervical Spine w/o Contrast    Narrative    EXAM: CT CERVICAL SPINE W/O CONTRAST  LOCATION: [facility]  DATE: 12/10/2023    INDICATION: Neck pain; Trauma; Significant trauma  COMPARISON: None.  TECHNIQUE: Routine CT Cervical Spine without IV contrast. Multiplanar reformats. Dose reduction techniques were used.    FINDINGS:  No evidence of acute fracture or subluxation of the cervical spine by CT imaging. Retrolisthesis of C3 on C4 measuring 3 mm. The vertebral bodies of the cervical spine otherwise have normal stature  and alignment. Anterior marginal osteophytes at C3/C4 -   C6/C7. Multilevel degenerative disc disease of the cervical spine with disc height loss, most pronounced at C5/C6 and C6/C7. No extraspinal abnormality.     Craniovertebral junction and C1-C2: The odontoid process is well approximated with the anterior body of C1 and well aligned between the lateral masses of C1.    C2-C3: No posterior disc bulge or spinal canal narrowing. No neural foraminal narrowing.     C3-C4: No posterior disc bulge or spinal canal narrowing. No neural foraminal narrowing.     C4-C5: No posterior disc bulge or spinal canal narrowing. Uncovertebral joint disease and facet arthropathy with mild bilateral neural foraminal narrowing.     C5-C6: No posterior disc bulge or spinal canal narrowing. Uncovertebral vertebral joint disease and facet arthropathy contribute to mild bilateral neural foraminal narrowing.     C6-C7: No posterior disc bulge or spinal canal narrowing. No neural foraminal narrowing.     C7-T1: No posterior disc bulge or spinal canal narrowing. No neural foraminal narrowing.       Impression    IMPRESSION:  1.  No evidence of acute fracture or subluxation of the cervical spine by CT imaging.  2.  Degenerative cervical spondylosis as described above.   Lumbar spine CT w/o contrast    Narrative    EXAM: CT LUMBAR SPINE W/O CONTRAST  LOCATION: [facility]  DATE: 12/10/2023    INDICATION: Low back pain; Trauma and or suspected fracture; Significant trauma; No known automatically detected potential contraindications to CT  COMPARISON: None.  TECHNIQUE: Routine CT Lumbar Spine without IV contrast. Multiplanar reformats. Dose reduction techniques were used.    FINDINGS:  Age-indeterminate superior endplate L2 compression fracture with 25% vertebral body height loss. No other evidence of acute fracture or subluxation of the lumbar spine by CT imaging. Anterior marginal osteophytes at L1/L2 - L4/L5. The  vertebral bodies of   the lumbar spine otherwise have normal stature and alignment. Multilevel degenerative disc disease of the lumbar spine with disc height loss, most pronounced at L3/L4 and L4/L5.    The partially imaged intra-abdominal contents are unremarkable.    T12/L1:  No posterior disc bulge or spinal canal narrowing. No neural foraminal narrowing.    L1/L2:  No posterior disc bulge or spinal canal narrowing. No neural foraminal narrowing.    L2/L3:  No posterior disc bulge or spinal canal narrowing. No neural foraminal narrowing.    L3/L4:  Symmetric disc bulge and mild facet arthropathy without significant spinal canal narrowing. Mild bilateral neural foraminal narrowing.      L4/L5:  Symmetric disc bulge and mild facet arthropathy without significant spinal canal narrowing. Moderate bilateral neural foraminal narrowing.     L5/S1: No posterior disc bulge or spinal canal narrowing. Moderate bilateral facet arthropathy. No neural foraminal narrowing.       Impression    IMPRESSION:    1.  Age-indeterminate superior endplate L2 compression fracture with 25% vertebral body height loss.  2.  No other evidence of acute fracture or subluxation of the lumbar spine by CT imaging.  3.  Degenerative lumbar spondylosis with level by level analysis as described above.   XR Pelvis and Hip Right 2 Views    Narrative    EXAM: XR PELVIS AND HIP RIGHT 2 VIEWS  LOCATION: [facility]  DATE: 12/10/2023    INDICATION: Trauma. Right hip pain.  COMPARISON: None.      Impression    IMPRESSION: Anatomic alignment right hip. Subtle lucency at the subcapital region of the right femoral neck could represent summation artifact or nondisplaced fracture. Bony irregularity at the right inferior pubic ramus is concerning for nondisplaced   right inferior pubic ramus fracture with likely nondisplaced fracture at the right puboacetabular junction superiorly near the lateral right superior pubic ramus. Mild bilateral  hip osteoarthritis with chondrocalcinosis. Diffuse bone demineralization.   Degenerative change lower lumbar spine and both SI joints.    Right hip MRI recommended (assuming no MRI contraindication).    NOTE: ABNORMAL REPORT    THE DICTATION ABOVE DESCRIBES AN ABNORMALITY FOR WHICH FOLLOW-UP IS NEEDED.        CT Hip Right w/o Contrast    Narrative    EXAM: CT HIP RIGHT W/O CONTRAST  LOCATION: [facility]  DATE: 12/10/2023    INDICATION: Trauma. Right proximal femur fracture.  COMPARISON: Pelvis and right hip radiographic exam earlier today.  TECHNIQUE: Noncontrast. Axial, sagittal and coronal thin-section reconstruction. Dose reduction techniques were used.     FINDINGS:     BONES:  -Anatomic alignment right hip. Nondisplaced subcapital right femoral neck fracture. Nondisplaced fracture anterior column right acetabulum near the anterior puboacetabular junction. Nondisplaced fracture at the right parasymphyseal pubis. Nondisplaced   fracture right inferior pubic ramus. Degenerative change lower lumbar spine and at the right SI joint. Mild right hip osteoarthritis. Arthritic change at the symphysis pubis is at least moderate. No aggressive destructive bone lesion. Diffuse bone   demineralization.    SOFT TISSUES:  -Right hip joint effusion. Small volume blood products particularly near the anterior column right acetabular fracture. Small volume blood products also seen in the anterior perivesicular space. Distended urinary bladder. Normal appendix. Arterial   calcification. No bulky adenopathy. Tiny fat-containing paraumbilical hernia. Colonic diverticulosis. Lateral right hip soft tissue swelling. Atrophy of the right gluteus minimus.      Impression    IMPRESSION:  1.  Nondisplaced subcapital right femoral neck fracture.  2.  Nondisplaced fracture anterior column right acetabulum near the anterior puboacetabular junction.  3.  Nondisplaced fracture right parasymphyseal pubis.  4.  Nondisplaced  "right inferior pubic ramus fracture.  5.  Small volume blood products in the right puboacetabular junction fracture and in the anterior perivesicular space.  6.  Mild degenerative right hip osteoarthritis.    NOTE: ABNORMAL REPORT    THE DICTATION ABOVE DESCRIBES AN ABNORMALITY FOR WHICH FOLLOW-UP IS NEEDED.      XR Surgery ROYA L/T 5 Min Fluoro w Stills    Narrative    This exam was marked as non-reportable because it will not be read by a   radiologist or a [place] non-radiologist provider.             Recent Labs   Lab 12/16/23 0823 12/16/23 0711   WBC  --  14.8*   HGB 6.4* 6.5*   HCT  --  20.7*   MCV  --  96   PLT  --  298     No results for input(s): \"CULT\" in the last 168 hours.  Recent Labs   Lab 12/16/23 0711      POTASSIUM 4.4   CHLORIDE 103   CO2 25   ANIONGAP 9   *   BUN 52.9*   CR 1.14*   GFRESTIMATED 44*   JOE 8.0*       Recent Labs   Lab 12/16/23 0711   *       No results for input(s): \"INR\" in the last 168 hours.    Pending Results:       Unresulted Labs Ordered in the Past 30 Days of this Admission       No orders found from 11/10/2023 to 12/11/2023.               Patient Allergies:       No Known Allergies      Disposition:     Disposition: hospice facility        I saw and evaluated the patient on day of discharge and  discharge instructions reviewed  and  all the patient's questions and concerns addressed. Over 30 minutes spent on discharge and coordination of discharge process for this patient.      Disclaimer: This note consists of symbols derived from keyboarding, dictation and/or voice recognition software. As a result, there may be errors in the script that have gone undetected. Please consider this when interpreting information found in this chart      "
